# Patient Record
Sex: MALE | Race: WHITE | NOT HISPANIC OR LATINO | Employment: OTHER | URBAN - METROPOLITAN AREA
[De-identification: names, ages, dates, MRNs, and addresses within clinical notes are randomized per-mention and may not be internally consistent; named-entity substitution may affect disease eponyms.]

---

## 2017-06-06 ENCOUNTER — TELEPHONE (OUTPATIENT)
Dept: TRANSPLANT | Facility: CLINIC | Age: 52
End: 2017-06-06

## 2017-06-19 DIAGNOSIS — Z76.82 ORGAN TRANSPLANT CANDIDATE: Primary | ICD-10-CM

## 2017-06-27 ENCOUNTER — OFFICE VISIT (OUTPATIENT)
Dept: TRANSPLANT | Facility: CLINIC | Age: 52
End: 2017-06-27
Payer: COMMERCIAL

## 2017-06-27 ENCOUNTER — HOSPITAL ENCOUNTER (OUTPATIENT)
Dept: RADIOLOGY | Facility: HOSPITAL | Age: 52
Discharge: HOME | End: 2017-06-27
Attending: TRANSPLANT SURGERY
Payer: COMMERCIAL

## 2017-06-27 ENCOUNTER — LAB VISIT (OUTPATIENT)
Dept: LAB | Facility: HOSPITAL | Age: 52
End: 2017-06-27
Payer: COMMERCIAL

## 2017-06-27 ENCOUNTER — CLINICAL SUPPORT (OUTPATIENT)
Dept: INFECTIOUS DISEASES | Facility: CLINIC | Age: 52
End: 2017-06-27
Payer: COMMERCIAL

## 2017-06-27 ENCOUNTER — HOSPITAL ENCOUNTER (OUTPATIENT)
Dept: RADIOLOGY | Facility: HOSPITAL | Age: 52
Discharge: HOME | End: 2017-06-27
Attending: NURSE PRACTITIONER
Payer: COMMERCIAL

## 2017-06-27 VITALS
OXYGEN SATURATION: 100 % | TEMPERATURE: 99 F | BODY MASS INDEX: 28.25 KG/M2 | RESPIRATION RATE: 18 BRPM | HEART RATE: 65 BPM | DIASTOLIC BLOOD PRESSURE: 85 MMHG | WEIGHT: 213.19 LBS | HEIGHT: 73 IN | SYSTOLIC BLOOD PRESSURE: 131 MMHG

## 2017-06-27 DIAGNOSIS — D63.1 ANEMIA OF RENAL DISEASE: Chronic | ICD-10-CM

## 2017-06-27 DIAGNOSIS — Z95.2 HX OF AORTIC VALVE REPLACEMENT: ICD-10-CM

## 2017-06-27 DIAGNOSIS — N18.9 ANEMIA OF RENAL DISEASE: Chronic | ICD-10-CM

## 2017-06-27 DIAGNOSIS — Z76.82 ORGAN TRANSPLANT CANDIDATE: ICD-10-CM

## 2017-06-27 DIAGNOSIS — I10 ESSENTIAL HYPERTENSION: Chronic | ICD-10-CM

## 2017-06-27 DIAGNOSIS — M32.14 LUPUS NEPHRITIS: ICD-10-CM

## 2017-06-27 DIAGNOSIS — N18.4 STAGE 4 CHRONIC KIDNEY DISEASE: Primary | Chronic | ICD-10-CM

## 2017-06-27 DIAGNOSIS — E87.20 METABOLIC ACIDOSIS: Chronic | ICD-10-CM

## 2017-06-27 DIAGNOSIS — M32.9 SYSTEMIC LUPUS ERYTHEMATOSUS, UNSPECIFIED SLE TYPE, UNSPECIFIED ORGAN INVOLVEMENT STATUS: ICD-10-CM

## 2017-06-27 DIAGNOSIS — Z01.818 PRE-TRANSPLANT EVALUATION FOR CKD (CHRONIC KIDNEY DISEASE): ICD-10-CM

## 2017-06-27 DIAGNOSIS — N25.81 SECONDARY HYPERPARATHYROIDISM OF RENAL ORIGIN: Chronic | ICD-10-CM

## 2017-06-27 DIAGNOSIS — R73.9 HYPERGLYCEMIA: ICD-10-CM

## 2017-06-27 LAB
ABO + RH BLD: NORMAL
ALBUMIN SERPL BCP-MCNC: 4 G/DL
ALP SERPL-CCNC: 111 U/L
ALT SERPL W/O P-5'-P-CCNC: 21 U/L
ANION GAP SERPL CALC-SCNC: 11 MMOL/L
APTT BLDCRRT: 24.7 SEC
AST SERPL-CCNC: 33 U/L
BACTERIA #/AREA URNS AUTO: NORMAL /HPF
BASOPHILS # BLD AUTO: 0.04 K/UL
BASOPHILS NFR BLD: 0.4 %
BILIRUB DIRECT SERPL-MCNC: 0.1 MG/DL
BILIRUB SERPL-MCNC: 0.3 MG/DL
BILIRUB UR QL STRIP: NEGATIVE
BLD GP AB SCN CELLS X3 SERPL QL: NORMAL
BUN SERPL-MCNC: 62 MG/DL
C3 SERPL-MCNC: 122 MG/DL
C4 SERPL-MCNC: 30 MG/DL
CALCIUM SERPL-MCNC: 10 MG/DL
CHLORIDE SERPL-SCNC: 110 MMOL/L
CHOLEST/HDLC SERPL: 3.7 {RATIO}
CLARITY UR REFRACT.AUTO: CLEAR
CO2 SERPL-SCNC: 20 MMOL/L
COLOR UR AUTO: ABNORMAL
COMPLEXED PSA SERPL-MCNC: 0.58 NG/ML
CREAT SERPL-MCNC: 3.8 MG/DL
CREAT UR-MCNC: 43 MG/DL
DIFFERENTIAL METHOD: ABNORMAL
EOSINOPHIL # BLD AUTO: 0.2 K/UL
EOSINOPHIL NFR BLD: 2.1 %
ERYTHROCYTE [DISTWIDTH] IN BLOOD BY AUTOMATED COUNT: 14.4 %
EST. GFR  (AFRICAN AMERICAN): 19.7 ML/MIN/1.73 M^2
EST. GFR  (NON AFRICAN AMERICAN): 17 ML/MIN/1.73 M^2
GLUCOSE SERPL-MCNC: 101 MG/DL
GLUCOSE UR QL STRIP: NEGATIVE
HBV CORE AB SERPL QL IA: NEGATIVE
HBV SURFACE AG SERPL QL IA: NEGATIVE
HCT VFR BLD AUTO: 42.3 %
HCV AB SERPL QL IA: NEGATIVE
HDL/CHOLESTEROL RATIO: 27 %
HDLC SERPL-MCNC: 137 MG/DL
HDLC SERPL-MCNC: 37 MG/DL
HGB BLD-MCNC: 13.3 G/DL
HGB UR QL STRIP: NEGATIVE
HIV 1+2 AB+HIV1 P24 AG SERPL QL IA: NEGATIVE
HYALINE CASTS UR QL AUTO: 0 /LPF
INR PPP: 0.9
KETONES UR QL STRIP: NEGATIVE
LDLC SERPL CALC-MCNC: 78.2 MG/DL
LEUKOCYTE ESTERASE UR QL STRIP: NEGATIVE
LYMPHOCYTES # BLD AUTO: 2.7 K/UL
LYMPHOCYTES NFR BLD: 27.5 %
MCH RBC QN AUTO: 28.6 PG
MCHC RBC AUTO-ENTMCNC: 31.4 %
MCV RBC AUTO: 91 FL
MICROSCOPIC COMMENT: NORMAL
MONOCYTES # BLD AUTO: 0.8 K/UL
MONOCYTES NFR BLD: 8 %
NEUTROPHILS # BLD AUTO: 6.1 K/UL
NEUTROPHILS NFR BLD: 61.7 %
NITRITE UR QL STRIP: NEGATIVE
NONHDLC SERPL-MCNC: 100 MG/DL
PH UR STRIP: 5 [PH] (ref 5–8)
PHOSPHATE SERPL-MCNC: 4.7 MG/DL
PLATELET # BLD AUTO: 304 K/UL
PMV BLD AUTO: 10.7 FL
POTASSIUM SERPL-SCNC: 4.8 MMOL/L
PROT SERPL-MCNC: 8 G/DL
PROT UR QL STRIP: ABNORMAL
PROT UR-MCNC: 31 MG/DL
PROT/CREAT RATIO, UR: 0.72
PROTHROMBIN TIME: 9.9 SEC
PTH-INTACT SERPL-MCNC: 81 PG/ML
RBC # BLD AUTO: 4.65 M/UL
RBC #/AREA URNS AUTO: 0 /HPF (ref 0–4)
RPR SER QL: NORMAL
SODIUM SERPL-SCNC: 141 MMOL/L
SP GR UR STRIP: 1.01 (ref 1–1.03)
TRIGL SERPL-MCNC: 109 MG/DL
URN SPEC COLLECT METH UR: ABNORMAL
UROBILINOGEN UR STRIP-ACNC: NEGATIVE EU/DL
WBC # BLD AUTO: 9.96 K/UL
WBC #/AREA URNS AUTO: 0 /HPF (ref 0–5)

## 2017-06-27 PROCEDURE — 85610 PROTHROMBIN TIME: CPT | Mod: TXP

## 2017-06-27 PROCEDURE — 86787 VARICELLA-ZOSTER ANTIBODY: CPT | Mod: TXP

## 2017-06-27 PROCEDURE — 81376 HLA II TYPING 1 LOCUS LR: CPT | Mod: PO,TXP

## 2017-06-27 PROCEDURE — 86038 ANTINUCLEAR ANTIBODIES: CPT | Mod: TXP

## 2017-06-27 PROCEDURE — 83970 ASSAY OF PARATHORMONE: CPT | Mod: TXP

## 2017-06-27 PROCEDURE — 81373 HLA I TYPING 1 LOCUS LR: CPT | Mod: 91,PO,TXP

## 2017-06-27 PROCEDURE — 86704 HEP B CORE ANTIBODY TOTAL: CPT | Mod: TXP

## 2017-06-27 PROCEDURE — 81001 URINALYSIS AUTO W/SCOPE: CPT | Mod: TXP

## 2017-06-27 PROCEDURE — 90471 IMMUNIZATION ADMIN: CPT | Mod: S$GLB,TXP,, | Performed by: INTERNAL MEDICINE

## 2017-06-27 PROCEDURE — 86706 HEP B SURFACE ANTIBODY: CPT | Mod: TXP

## 2017-06-27 PROCEDURE — 71020 XR CHEST PA AND LATERAL: CPT | Mod: TC,TXP

## 2017-06-27 PROCEDURE — 90740 HEPB VACC 3 DOSE IMMUNSUP IM: CPT | Mod: S$GLB,TXP,, | Performed by: INTERNAL MEDICINE

## 2017-06-27 PROCEDURE — 90670 PCV13 VACCINE IM: CPT | Mod: S$GLB,TXP,, | Performed by: INTERNAL MEDICINE

## 2017-06-27 PROCEDURE — 99205 OFFICE O/P NEW HI 60 MIN: CPT | Mod: S$GLB,TXP,, | Performed by: INTERNAL MEDICINE

## 2017-06-27 PROCEDURE — 86225 DNA ANTIBODY NATIVE: CPT | Mod: TXP

## 2017-06-27 PROCEDURE — 81373 HLA I TYPING 1 LOCUS LR: CPT | Mod: PO,TXP

## 2017-06-27 PROCEDURE — 86256 FLUORESCENT ANTIBODY TITER: CPT | Mod: TXP

## 2017-06-27 PROCEDURE — 85730 THROMBOPLASTIN TIME PARTIAL: CPT | Mod: TXP

## 2017-06-27 PROCEDURE — 86825 HLA X-MATH NON-CYTOTOXIC: CPT | Mod: PO,TXP

## 2017-06-27 PROCEDURE — 82248 BILIRUBIN DIRECT: CPT | Mod: TXP

## 2017-06-27 PROCEDURE — 81376 HLA II TYPING 1 LOCUS LR: CPT | Mod: 91,PO,TXP

## 2017-06-27 PROCEDURE — 86480 TB TEST CELL IMMUN MEASURE: CPT | Mod: TXP

## 2017-06-27 PROCEDURE — 86703 HIV-1/HIV-2 1 RESULT ANTBDY: CPT | Mod: TXP

## 2017-06-27 PROCEDURE — 86829 HLA CLASS I/II ANTIBODY QUAL: CPT | Mod: 91,PO,TXP

## 2017-06-27 PROCEDURE — 86900 BLOOD TYPING SEROLOGIC ABO: CPT | Mod: TXP

## 2017-06-27 PROCEDURE — 85025 COMPLETE CBC W/AUTO DIFF WBC: CPT | Mod: TXP

## 2017-06-27 PROCEDURE — 86901 BLOOD TYPING SEROLOGIC RH(D): CPT | Mod: TXP

## 2017-06-27 PROCEDURE — 72170 X-RAY EXAM OF PELVIS: CPT | Mod: 26,TXP,, | Performed by: RADIOLOGY

## 2017-06-27 PROCEDURE — 72170 X-RAY EXAM OF PELVIS: CPT | Mod: TC,TXP

## 2017-06-27 PROCEDURE — 86592 SYPHILIS TEST NON-TREP QUAL: CPT | Mod: TXP

## 2017-06-27 PROCEDURE — 86644 CMV ANTIBODY: CPT | Mod: TXP

## 2017-06-27 PROCEDURE — 99999 PR PBB SHADOW E&M-EST. PATIENT-LVL IV: CPT | Mod: PBBFAC,TXP,, | Performed by: INTERNAL MEDICINE

## 2017-06-27 PROCEDURE — 84153 ASSAY OF PSA TOTAL: CPT | Mod: TXP

## 2017-06-27 PROCEDURE — 97802 MEDICAL NUTRITION INDIV IN: CPT | Mod: S$GLB,TXP,, | Performed by: DIETITIAN, REGISTERED

## 2017-06-27 PROCEDURE — 80061 LIPID PANEL: CPT | Mod: TXP

## 2017-06-27 PROCEDURE — 86825 HLA X-MATH NON-CYTOTOXIC: CPT | Mod: 91,PO,TXP

## 2017-06-27 PROCEDURE — 80053 COMPREHEN METABOLIC PANEL: CPT | Mod: TXP

## 2017-06-27 PROCEDURE — 76770 US EXAM ABDO BACK WALL COMP: CPT | Mod: TC,TXP

## 2017-06-27 PROCEDURE — 86160 COMPLEMENT ANTIGEN: CPT | Mod: 59,TXP

## 2017-06-27 PROCEDURE — 90632 HEPA VACCINE ADULT IM: CPT | Mod: S$GLB,TXP,, | Performed by: INTERNAL MEDICINE

## 2017-06-27 PROCEDURE — 82570 ASSAY OF URINE CREATININE: CPT | Mod: TXP

## 2017-06-27 PROCEDURE — 71020 XR CHEST PA AND LATERAL: CPT | Mod: 26,TXP,, | Performed by: RADIOLOGY

## 2017-06-27 PROCEDURE — 99204 OFFICE O/P NEW MOD 45 MIN: CPT | Mod: S$GLB,TXP,, | Performed by: PHYSICIAN ASSISTANT

## 2017-06-27 PROCEDURE — 76770 US EXAM ABDO BACK WALL COMP: CPT | Mod: 26,TXP,, | Performed by: RADIOLOGY

## 2017-06-27 PROCEDURE — 87340 HEPATITIS B SURFACE AG IA: CPT | Mod: TXP

## 2017-06-27 PROCEDURE — 86803 HEPATITIS C AB TEST: CPT | Mod: TXP

## 2017-06-27 PROCEDURE — 86235 NUCLEAR ANTIGEN ANTIBODY: CPT | Mod: TXP

## 2017-06-27 PROCEDURE — 86160 COMPLEMENT ANTIGEN: CPT | Mod: TXP

## 2017-06-27 PROCEDURE — 84100 ASSAY OF PHOSPHORUS: CPT | Mod: TXP

## 2017-06-27 PROCEDURE — 86828 HLA CLASS I&II ANTIBODY QUAL: CPT | Mod: PO,TXP

## 2017-06-27 PROCEDURE — 86682 HELMINTH ANTIBODY: CPT | Mod: TXP

## 2017-06-27 PROCEDURE — 90472 IMMUNIZATION ADMIN EACH ADD: CPT | Mod: S$GLB,TXP,, | Performed by: INTERNAL MEDICINE

## 2017-06-27 PROCEDURE — 99202 OFFICE O/P NEW SF 15 MIN: CPT | Mod: S$GLB,TXP,, | Performed by: TRANSPLANT SURGERY

## 2017-06-27 RX ORDER — VALSARTAN 160 MG/1
160 TABLET ORAL DAILY
COMMUNITY
End: 2018-09-21

## 2017-06-27 RX ORDER — MULTIVITAMIN
1 TABLET ORAL DAILY
COMMUNITY
End: 2021-11-05

## 2017-06-27 RX ORDER — FUROSEMIDE 20 MG/1
80 TABLET ORAL 2 TIMES DAILY
COMMUNITY
End: 2021-11-05

## 2017-06-27 RX ORDER — METOPROLOL SUCCINATE 50 MG/1
50 TABLET, EXTENDED RELEASE ORAL DAILY
COMMUNITY
End: 2022-10-14

## 2017-06-27 NOTE — PROGRESS NOTES
Transplant Surgery  Kidney Transplant Recipient Evaluation    Referring Physician: Oanh López  Current Nephrologist: Oanh López    Subjective:     Reason for Visit: evaluate transplant candidacy    History of Present Illness: Spike Estrella is a 51 y.o. year old male undergoing transplant evaluation.    Dialysis History: Spike is pre-dialysis.      Transplant History: N/A    Etiology of Renal Disease: Systemic Lupus Erythematosus (based on medical records from referral).    Review of Systems   Constitutional: Negative for activity change, appetite change, chills, diaphoresis, fatigue, fever and unexpected weight change.   HENT: Negative for congestion, dental problem, ear pain, facial swelling, mouth sores, nosebleeds, sore throat, tinnitus, trouble swallowing and voice change.    Eyes: Negative for photophobia, pain and visual disturbance.   Respiratory: Negative for apnea, cough, choking, chest tightness and shortness of breath.    Cardiovascular: Negative for chest pain, palpitations and leg swelling.   Gastrointestinal: Negative for abdominal distention, abdominal pain, blood in stool, constipation, diarrhea, nausea and vomiting.   Endocrine: Negative for cold intolerance and heat intolerance.   Genitourinary: Negative for difficulty urinating, dysuria, flank pain, hematuria and urgency.   Musculoskeletal: Negative for arthralgias and gait problem.   Skin: Negative for color change, pallor and rash.   Neurological: Negative for dizziness, tremors, seizures, syncope and light-headedness.   Hematological: Negative for adenopathy. Does not bruise/bleed easily.   Psychiatric/Behavioral: Negative for agitation and confusion.       Objective:     Physical Exam:  Constitutional:   Vitals reviewed: yes   Well-nourished and well-groomed: yes  Eyes:   Sclerae icteric: no   Extraocular movements intact: yes  GI:    Bowel sounds normal: yes   Tenderness: no    If yes, quadrant/location: not applicable   Palpable  masses: no    If yes, quadrant/location: not applicable   Hepatosplenomegaly: no   Ascites: no   Hernia: no    If yes, type/location: not applicable   Surgical scars: no    If yes, type/location: not applicable  Resp:   Effort normal: yes   Breath sounds normal: yes    CV:   Regular rate and rhythm: yes   Heart sounds normal: yes   Femoral pulses normal: yes   Extremities edematous: no  Skin:   Rashes or lesions present: no    If yes, describe:not applicable   Jaundice:: no    Musculoskeletal:   Gait normal: yes   Strength normal: yes  Psych:   Oriented to person, place, and time: yes   Affect and mood normal: yes    Additional comments: not applicable    Counseling: We provided Spike Estrella with a group education session today.  We discussed kidney transplantation at length with him, including risks, potential complications, and alternatives in the management of his renal failure.  The discussion included complications related to anesthesia, bleeding, infection, primary nonfunction, and ATN.  I discussed the typical postoperative course, length of hospitalization, the need for long-term immunosuppression, and the need for long-term routine follow-up.  I discussed living-donor and -donor transplantation and the relative advantages and disadvantages of each.  I also discussed average waiting times for both living donation and  donation.  I discussed national and center-specific survival rates.  I also mentioned the potential benefit of multicenter listing to candidates listed with centers within more than one organ procurement organization.  All questions were answered.    Final determination of transplant candidacy will be made once evaluation is complete and reviewed by the Kidney & Kidney/Pancreas Selection Committee.         Transplant Surgery - Candidacy   Assessment/Plan:   Spike Estrella is pre-dialysis with CKD stage 4 (GFR 15-29 mL/min). I see no surgical contraindication to placing a  kidney transplant. Based on available information, Spike Estrella is a suitable kidney transplant candidate.     Umm Zheng MD

## 2017-06-27 NOTE — PROGRESS NOTES
HEPATITIS A VACCINE, HEPATITIS B DIALYSIS FORMULATION VACCINE AND PNEUMOCOCCAL 13-VALENT VACCINE ADMINISTERED.  PT TOLERATED WELL AND LEFT IN NAD.

## 2017-06-27 NOTE — PROGRESS NOTES
Pre Transplant Infectious Diseases Consult  Kidney Transplant Recipient Evaluation    Requesting Physician:     Reason for Visit:    Chief Complaint   Patient presents with    Chronic Kidney Disease    Kidney Transplant Pre-evaluation     History of Present Illness  Spike Estrella is a 51 y.o. year old White male with advanced Kidney disease currently being evaluated for Kidney transplant.  Pt is on immunosuppressive therapy, currently on Plaquenil and Cellcept for 23 years. Etiology of kidney disease is lupus. Pt is pre-dialysis.  Patient denies any recent fever, chills, or infective illnesses.      1) Do you have a history of:   YES NO   Diabetes      [] [x]     Diabetic Foot Infection/Bone Infection  []        [x]     Surgical Removal of Spleen   []  [x]               2) Have you had recurrent infections involving:             YES NO  Sinus infections  []         [x]   Sore Throat   []         [x]                 Prostate Infections  []         [x]              Bladder Infections  []         [x]                     Kidney Infections  []         [x]                               Intestinal Infections  []         [x]      Skin Infections   []         [x]       Reproductive Infections          []  [x]   Periodontal Disease  []         [x]        3)Have you ever had: YES     NO UNKNOWN      Chicken Pox   [x]         []  []   Shingles   []         [x]  []   Orolabial Herpes             []  [x]  []   Genital Herpes  []         [x]  []   Cytomegalovirus  []         [x]  []   Tanisha-Barr Virus  []         [x]  []   Genital Warts   []         [x]  []   Hepatitis A   []         [x]  []   Hepatitis B   []         [x]  []   Hepatitis C   []         [x]  []   Syphilis   []         [x]  []   Gonorrhea   []         [x]  []   Chlamydia Infection  []         [x]  []   Intestinal parasites   or worms   []         [x]  []   Fungal Infections  []         [x]  []   Blood Infections  []         [x]  []       Comment:       4) Have  you ever been exposed   YES NO  To someone with tuberculosis?  []   [x]   If yes, what treatment did you receive:     5) What states have you lived in? [place],     6) What countries have you visited for more than 2 weeks? [place], [place]                        YES NO  7) Did you have any associated infections?  [x]  []  Diarrhea in [place], treated with antibiotics, saw provider in [place] and was treated. No recurrence.     8) Are you planning to travel outside the    []  [x]   [place] after your transplant?    9) Household                   YES NO  Do you have pets living in your house?    [x]         []   If yes, describe:  Outdoor dog     Do you spend time or live on a farm or     []         [x]   have livestock or other farm animals?  If yes, which ones:    Do you have a fish tank?          []  [x]       Do you have a litter box?      []         [x]     Do you fish or hunt?  hunt      [x]         []     Do you clean or skin fish or animals?    [x]         []     Do you consume raw or undercooked    []         [x]   meat, fish, or shellfish?      10) What occupations have you had?    11) Patient reports hobby to be          12)Do you garden or otherwise  YES NO   work in the soil?    []         [x]   13)Do you hike, camp, or spend     time in wooded areas?   []         [x]        14) The patient's immunization history was reviewed.    Have you ever received:  YES NO UNKNOWN DATES   Routine Childhood vaccines  [x]         []  []      Influenza vaccine   [x]  []  []     Pneumovax    [x]  []  []     Tetanus-diptheria   [x]         []  [] 2014   Hepatitis A vaccine series       []  [x]  []    Hepatitis B vaccine series         []  [x]  []    Meningitis vaccine   []         []  [x]    Varicella vaccine   []         []  [x]        Based on the patients immunization history and serologies, immunizations were ordered:         Ordered  Not Ordered  Influenza Vaccine     []    [x]   Hepatitis A series at 0,  6 months   [x]     []   Hepatitis B seriesat 0, 1, and 6 months  []    [x]   Hepatitis B High Dose 0,1, and 6 months  [x]    []   Prevnar      [x]    []   Pneumovax      []    [x]    TDap       []    [x]    Zoster       []    [x]   Menactra      []    [x]            The patient was encouraged to contact us about any problems that may develop after immunization and possible side effects were reviewed.      Previous Transplant: no    Etiology of Kidney Disease: lupus nephritis    Allergies: Adhesive and Codeine    There is no immunization history on file for this patient.  Past Medical History:   Diagnosis Date    Anemia of renal disease     CKD (chronic kidney disease)     Essential hypertension     Hx of aortic valve replacement     Hypertension     Immunosuppressed status     Lupus nephritis     Metabolic acidosis     Secondary hyperparathyroidism of renal origin     Stage 4 chronic kidney disease     Stenosis and insufficiency of lacrimal passages     Systemic lupus erythematosus      Past Surgical History:   Procedure Laterality Date    AORTIC VALVE REPLACEMENT      TONSILLECTOMY        Social History     Social History    Marital status:      Spouse name: N/A    Number of children: N/A    Years of education: N/A     Occupational History    Not on file.     Social History Main Topics    Smoking status: Never Smoker    Smokeless tobacco: Former User     Types: Snuff, Chew     Quit date: 5/13/1999    Alcohol use 0.6 oz/week     1 Cans of beer per week    Drug use: Unknown    Sexual activity: Not on file     Other Topics Concern    Not on file     Social History Narrative    No narrative on file       Review of Systems   Constitution: Negative for chills, decreased appetite, fever, weakness, malaise/fatigue, night sweats, weight gain and weight loss.   HENT: Negative for congestion, ear pain, headaches, hearing loss, hoarse voice, sore throat and tinnitus.    Eyes: Negative for blurred vision, pain,  vision loss in left eye, vision loss in right eye and visual disturbance.   Cardiovascular: Negative for chest pain, dyspnea on exertion, leg swelling and palpitations.   Respiratory: Negative for cough, shortness of breath, sputum production and wheezing.    Skin: Negative for dry skin, itching, rash and suspicious lesions.   Musculoskeletal: Negative for back pain, joint pain, myalgias and neck pain.   Gastrointestinal: Negative for abdominal pain, constipation, diarrhea, heartburn, nausea and vomiting.   Genitourinary: Negative for dysuria, flank pain, frequency, hematuria, hesitancy and urgency.   Neurological: Negative for dizziness, numbness and paresthesias.   Psychiatric/Behavioral: Negative for depression and memory loss. The patient does not have insomnia and is not nervous/anxious.      Physical Exam   Constitutional: He is oriented to person, place, and time. He appears well-developed and well-nourished. No distress.   HENT:   Head: Normocephalic and atraumatic.   Mouth/Throat: Oropharynx is clear and moist.   Eyes: EOM are normal. Pupils are equal, round, and reactive to light.   Neck: Normal range of motion. Neck supple.   Cardiovascular: Normal rate, regular rhythm, normal heart sounds and intact distal pulses.  Exam reveals no gallop and no friction rub.    No murmur heard.  Pulmonary/Chest: Effort normal and breath sounds normal. No respiratory distress. He has no wheezes. He has no rales. He exhibits no tenderness.   Abdominal: Bowel sounds are normal. He exhibits no distension and no mass. There is no tenderness. There is no guarding.   Musculoskeletal: Normal range of motion. He exhibits no edema or deformity.   Neurological: He is alert and oriented to person, place, and time.   Skin: Skin is warm and dry. No rash noted. He is not diaphoretic. No erythema.   Psychiatric: He has a normal mood and affect. His behavior is normal. Judgment and thought content normal.   Nursing note and vitals  reviewed.    Diagnostics: No results found for: RPR  No results found for: CMVANTIBODIE  No results found for: HIV1X2  No results found for: HTLVIIIANTIB  No results found for: HEPBSAG  No results found for: HEPBCAB  No results found for: HEPCAB  No results found for: TOXOIGG  No components found for: TOXOIGGINTER  No results found for: INO8GUW  No results found for: CFS4QUG  No results found for: VARICELLAZOS  No results found for: VARICELLAINT  No results found for: STRONGANTIGG  No results found for: EPSTEINBARRV  No results found for: HEPBSAB  No results found for: QUANTIFERON  No results found for: HEPAIGM  No results found for: PPD  No results found for this or any previous visit.         Transplant Infectious Diseases - Candidacy    Assessment/Plan:     Transplant Candidacy: Based on available information, there are no identified significant barriers to transplantation from an infectious disease standpoint pending acceptable serologies.  Final determination of transplant candidacy will be made once evaluation is complete and reviewed by the Transplant Selection Committee.    Quantiferon gold, strongyloides, HIV, and RPR pending. If positive, please refer to ID clinic.    Vaccines today  Hepatitis A   Hepatitis B (high dose). 2nd dose due in 1 month. 3rd dose due in 6 months  prevnar     Shannan Palma PA-C         Counseling:I discussed with Spike the risk for increased susceptibility to infections following transplantation including increased risk for infection right after transplant and if rejection should occur.  The patients has been counseled on the importance of vaccinations including but not limited to a yearly flu vaccine.  Specific guidance has been provided to the patient regarding the patients occupation, hobbies and activities to avoid future infectious complications including but not limited to avoiding undercooked meats and seafood, proper hygiene, and contact with animals.

## 2017-06-27 NOTE — PROGRESS NOTES
"TRANSPLANT NUTRITIONAL ASSESSMENT    Referring Provider: Viola Culp MD    Reason for Visit: Pre-kidney transplant work-up (pre-dialysis)    Age: 51 y.o.  Sex: male    Patient Active Problem List   Diagnosis    Stage 4 chronic kidney disease    SLE (systemic lupus erythematosus)    Hyperglycemia    Essential hypertension    Hx of aortic valve replacement    Lupus nephritis    Anemia of renal disease    Metabolic acidosis    Secondary hyperparathyroidism of renal origin     Past Medical History:   Diagnosis Date    Anemia of renal disease     Essential hypertension     Hx of aortic valve replacement     Immunosuppressed status     Lupus nephritis     Metabolic acidosis     Secondary hyperparathyroidism of renal origin     Stage 4 chronic kidney disease     Stenosis and insufficiency of lacrimal passages     Systemic lupus erythematosus      Lab Results   Component Value Date     06/27/2017    K 4.8 06/27/2017    PHOS 4.7 (H) 06/27/2017    MG 2.2 06/19/2014    CHOL 137 06/27/2017    HDL 37 (L) 06/27/2017    TRIG 109 06/27/2017    ALBUMIN 4.0 06/27/2017    HGBA1C 5.4 06/12/2014    CALCIUM 10.0 06/27/2017       Current Outpatient Prescriptions   Medication Sig    aspirin (ECOTRIN) 325 MG EC tablet Take 1 tablet (325 mg total) by mouth once daily.    furosemide (LASIX) 20 MG tablet Take 20 mg by mouth 2 (two) times daily.    hydroxychloroquine (PLAQUENIL) 200 mg tablet Take 200 mg by mouth once daily.     metoprolol succinate (TOPROL-XL) 50 MG 24 hr tablet Take 50 mg by mouth once daily.    multivitamin (THERAGRAN) per tablet Take 1 tablet by mouth once daily.    valsartan (DIOVAN) 160 MG tablet Take 160 mg by mouth once daily.    mycophenolate (CELLCEPT) 500 mg Tab 1,000 mg 2 (two) times daily.      No current facility-administered medications for this visit.      Allergies: Adhesive and Codeine    Ht Readings from Last 1 Encounters:   06/27/17 6' 1.23" (1.86 m)     Wt Readings from " Last 1 Encounters:   06/27/17 96.7 kg (213 lb 3 oz)      BMI: Body mass index is 27.95 kg/m².    Weight Change/Time: no significant wt change reported  Current Diet: general diet-no restriction  Appetite/Current Intake: good   Exercise/Physical Activity: maintains yard and climbs stairs at work  Nutritional/Herbal Supplements: Centrum Silver multivitamin  Chewing/Swallowing Problems: none reported  Symptoms: none    Estimated Kcal Need: 2400kcals/day (25kcals/kg BW)  Estimated Protein Need: 76gms protein/day 90.8gms/kg BW)    Nutritional History: Pt and wife prepares meals and grocery shops.  Dining out/fast food: 5 times per week usually pizza, burgers and fries, fried chicken, or [employer]-encouraged pt to limit intake of fast food due to sodium and fat content.  Pt typically consumes 2 meals per day, seldom has snacks between meals, beverages include water, coffee, sweet tea, and Gatorade.      Educational Need? yes  Barriers: none identified  Discussed with: patient and wife  Interventions: Patient taught nutrition information regarding   -Renal Nutrition Therapy packet reviewed ( high/low food sources of K, Phos and protein, low sodium and fluid intake, emphasis on moderate protein intake)   Goals/Recommendations: diet adherence, choose healthy options when dining out and limit intake of high phosphorus foods  Actions Taken: instruct/provide written information  Patient and/or family comprehend instructions: yes  Outcome: Verbalizes understanding  Monitoring: contact information provided, will follow-up as needed    Counseling Time: 15 minutes

## 2017-06-27 NOTE — PROGRESS NOTES
PHARM.D. PRE-TRANSPLANT NOTE:    This patient's medication therapy was evaluated as part of his pre-transplant evaluation.    The following pharmacologic concerns were noted: Patient is currently on mycophenolate. Patient has also undergone cytoxan therapy in the past.      Current Outpatient Prescriptions   Medication Sig Dispense Refill    aspirin (ECOTRIN) 325 MG EC tablet Take 1 tablet (325 mg total) by mouth once daily. 30 tablet 3    furosemide (LASIX) 20 MG tablet Take 20 mg by mouth 2 (two) times daily.      hydroxychloroquine (PLAQUENIL) 200 mg tablet Take 200 mg by mouth once daily.   11    metoprolol succinate (TOPROL-XL) 50 MG 24 hr tablet Take 50 mg by mouth once daily.      multivitamin (THERAGRAN) per tablet Take 1 tablet by mouth once daily.      valsartan (DIOVAN) 160 MG tablet Take 160 mg by mouth once daily.      mycophenolate (CELLCEPT) 500 mg Tab 1,000 mg 2 (two) times daily.   3     No current facility-administered medications for this visit.          Currently the patient is responsible for preparing / administering this patient's medications on a daily basis.  I am available for consultation and can be contacted, as needed by the other members of the Kidney Transplant team.

## 2017-06-27 NOTE — PROGRESS NOTES
Transplant Nephrology  Kidney Transplant Recipient Evaluation    Referring Physician: Oanh López  Current Nephrologist: Oanh López    Subjective:   CC:  Initial evaluation of kidney transplant candidacy.    HPI:  Mr. Estrella is a 51 y.o. year old White male who has presented to be evaluated as a potential kidney transplant recipient.  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 He states that he has not had a kidney biopsy. Patient is currently pre-dialysis. He does not have a dialysis access. He has never had any blood transfusion but would be ok accepting blood products if needed.     He was diagnosed with SLE ~25 years ago. He had Cytoxan for either 3 cycles (per patient) or for 3 years (per wife). He also was on steroids. Currently on MMF 1000 mg BID and plaquenil 200 mg. He follows with Dr. Merlin Wilson in [facility] (phone # [phone]).    He has history of porcine AVR on 6/16/14. Per patient and wife he needed the value replacement due to the lupus.     Denies history of DM, MI, CVA, cancer    He is accompanied to visit by his wife     He is working 56 hours a week and wakes up at 4:40 AM every day.     Previous Transplant: no    Functional Status: He doesn't exercise formally but states he is active dong yard work (of 15 acre property); and works in a 2 story building and his office is on the second floor thus is climbing stairs frequently. With the activity that he does he denies any symptoms of chest pain, SOB, claudication.     Past Medical History:  Past Medical History:   Diagnosis Date    Anemia of renal disease     Essential hypertension     Hx of aortic valve replacement     Immunosuppressed status     Lupus nephritis     Metabolic acidosis     Secondary hyperparathyroidism of renal origin     Stage 4 chronic kidney disease     Stenosis and insufficiency of lacrimal passages     Systemic lupus erythematosus        Past Surgical History:   Past Surgical  History:   Procedure Laterality Date    AORTIC VALVE REPLACEMENT  06/16/2014    porcine valve replacement    TONSILLECTOMY         Medications:   Current Outpatient Prescriptions   Medication Sig Dispense Refill    aspirin (ECOTRIN) 325 MG EC tablet Take 1 tablet (325 mg total) by mouth once daily. 30 tablet 3    furosemide (LASIX) 20 MG tablet Take 20 mg by mouth 2 (two) times daily.      hydroxychloroquine (PLAQUENIL) 200 mg tablet Take 200 mg by mouth once daily.   11    metoprolol succinate (TOPROL-XL) 50 MG 24 hr tablet Take 50 mg by mouth once daily.      multivitamin (THERAGRAN) per tablet Take 1 tablet by mouth once daily.      valsartan (DIOVAN) 160 MG tablet Take 160 mg by mouth once daily.      mycophenolate (CELLCEPT) 500 mg Tab 1,000 mg 2 (two) times daily.   3     No current facility-administered medications for this visit.        Social History:  Social History     Social History    Marital status:      Spouse name: N/A    Number of children: N/A    Years of education: N/A     Occupational History    Not on file.     Social History Main Topics    Smoking status: Never Smoker    Smokeless tobacco: Former User     Types: Snuff, Chew     Quit date: 1997    Alcohol use 0.6 oz/week     1 Cans of beer per week      Comment: occasionally     Drug use: No    Sexual activity: Not on file     Other Topics Concern    Not on file     Social History Narrative    Lives with wife and son     Working as a         Family History:   Family History   Problem Relation Age of Onset    Valvular heart disease Mother     Hypertension Mother     Lymphoma Father     No Known Problems Brother     Coronary artery disease Maternal Uncle      s/p CABG in his 50-60s    No Known Problems Brother     No Known Problems Brother     No Known Problems Brother     Kidney disease Neg Hx     Diabetes Neg Hx     Stroke Neg Hx     Lupus Neg Hx        Review of Systems  Constitutional: Denies  "fever/chills, fatigue, night sweats  EENT: +wears eye glasses; +certain pitch hearing loss; Denies trouble swallowing.   Respiratory: +sinus congestion; Denies shortness of breath, dyspnea on exertion, orthopnea, wheezing, hemoptysis, denies known TB exposure or history of positive TB skin test  Cardiovascular: Denies chest pain, palpitations, history of MI, history of stroke, history of DVT  Gastrointestinal: Denies abdominal pain, nausea/vomiting/diarrhea/constipation. Denies history of GI bleeding or ulcers.   Genitourinary: +microscopic hematuria; +foamy urine; ?kidney stone - renal colic symptoms but never was detected on imaging and did not pass it in ~2000; Denies recurrent UTI's, history of urinary obstruction, dysuria, urinary frequency, incontinence  Musculoskeletal: Denies trouble moving extremities, denies joint pain or swelling  Skin: +open sores on shins from scratching his dry skin; +likely Raynaud's; Denies history of skin cancer, denies rash  Heme/onc: +bruises easily; Denies any history of cancer  Endocrine: Denies thyroid disease, unintentional weight loss/weight gain  Neurological: +seldom headaches; Denies tremors, seizures, dizziness, peripheral neuropathy  Psychiatric: Denies anxiety, depression. Denies hallucinations or delusions.    Potential Donors: all 4 of his brothers and wife       Objective:   Blood pressure 131/85, pulse 65, temperature 98.7 °F (37.1 °C), temperature source Oral, resp. rate 18, height 6' 1.23" (1.86 m), weight 96.7 kg (213 lb 3 oz), SpO2 100 %.body mass index is 27.95 kg/m².    Physical Exam  General: No acute distress, well groomed, alert and oriented x 3  HEENT: Normocephalic, atraumatic, PERRLA, sclera anicteric, conjunctiva/corneas clear, EOM's intact bilaterally, external inspection of ears and nose normal, moist mucous membranes, no oral ulcerations/lesions   Neck: Supple, symmetrical, trachea midline, no masses, no carotid bruits, no JVD, thyroid is normal " without nodules or enlargement   Respiratory: Clear to auscultation bilaterally, respirations unlabored, no rales/rhonchi/wheezing   Cardiovacular: Regular rate and rhythm, S1, S2 normal, no murmurs, no rubs or gallops   Gastrointestinal: Soft, non-tender, bowel sounds normal, no masses, no palpable organomegaly  Extremities: No clubbing or cyanosis of upper extremities bilaterally, no pedal edema bilaterally; +2 bilateral radial pulses  Skin: warm and dry; no rash on exposed skin; scratch marks along right shin with some surrounding erythema  Lymph nodes: Cervical and supraclavicular nodes normal   Neurologic: No focal neurologic deficits.   Musculoskeletal: moves all extremities without difficulty, FROM, 5/5 strength, ambulates without an assistive device  Psychiatric: Normal mood and affect. Responds appropriately to questions.      Labs:  Lab Results   Component Value Date    WBC 9.96 06/27/2017    HGB 13.3 (L) 06/27/2017    HCT 42.3 06/27/2017     06/27/2017    K 4.8 06/27/2017     06/27/2017    CO2 20 (L) 06/27/2017    BUN 62 (H) 06/27/2017    CREATININE 3.8 (H) 06/27/2017    EGFRNONAA 17.0 (A) 06/27/2017    CALCIUM 10.0 06/27/2017    PHOS 4.7 (H) 06/27/2017    MG 2.2 06/19/2014    ALBUMIN 4.0 06/27/2017    AST 33 06/27/2017    ALT 21 06/27/2017    PTH 81.0 (H) 06/27/2017       Lab Results   Component Value Date    BILIRUBINUA Negative 06/12/2014    PROTEINUA 1+ (A) 06/12/2014    NITRITE Negative 06/12/2014    RBCUA 0 06/12/2014    WBCUA 0 06/12/2014       No results found for: HLAABCTYPE    Labs were reviewed with the patient.    Assessment:     1. Stage 4 chronic kidney disease    2. Hx of aortic valve replacement    3. Essential hypertension    4. Hyperglycemia    5. Pre-transplant evaluation for CKD (chronic kidney disease)    6. Lupus nephritis    7. Anemia of renal disease    8. Metabolic acidosis    9. Systemic lupus erythematosus, unspecified SLE type, unspecified organ involvement status     10. Secondary hyperparathyroidism of renal origin        Plan:     Transplant Candidacy:   After obtaining history and performing physical exam as well as reviewing available diagnostic studies, Mr. Estrella is a suitable kidney transplant candidate.  Final determination of transplant candidacy will be made once workup is complete and reviewed by the selection committee.    Prior to Listing, will need the following items to be completed:  1. Cardiac stress test   2. Standard serologies and blood work  3. UA and UPC today in clinic   4. Obtain records and clearance from rheumatology especially regarding how much Cytoxan he received and when  5. Urology consult with cystoscopy given patient reported microscopic hematuria and prior Cytoxan use   6. Wound clearance    Advised patient to see his PCP regarding the wounds on his shin with surrounding erythema. Advised him to go to ED if he developed fevers or if the wounds got worse.       Viola Culp MD       [facility] Patient Status  Functional Status: 70% - Cares for self: unable to carry on normal activity or active work  Physical Capacity: No Limitations

## 2017-06-27 NOTE — PATIENT INSTRUCTIONS
1. Continue exercising   2. Don't scratch any more   3. Talk to your PCP about the wounds on the shin  4. Go to ED if you have fevers or if the redness is worse   5. We will need to get records from your rheumatologist   6. We will have you see a urologist and get cystoscopy done   7. Give a urine sample today in clinic so we can assess the amount of protein you are spilling in the urine

## 2017-06-27 NOTE — LETTER
June 28, 2017        Oanh López  [address] [facility] [place] [address]  Phone: [phone]  Fax: [phone]             [facility]  [address]  [facility] [address]  Phone: [phone]   Patient: Spike Estrella   MR Number: [id number]   YOB: 1965   Date of Visit: 6/27/2017       Dear Dr. Oanh López    Thank you for referring Spike Estrella to me for evaluation. Attached you will find relevant portions of my assessment and plan of care.    If you have questions, please do not hesitate to call me. I look forward to following Spike Estrella along with you.    Sincerely,    Viola Culp MD    Enclosure    If you would like to receive this communication electronically, please contact [email or web address] or [phone] to request [employer] access.    [employer] is a tool which provides read-only access to select patient information with whom you have a relationship. Its easy to use and provides real time access to review your patients record including encounter summaries, notes, results, and demographic information.    If you feel you have received this communication in error or would no longer like to receive these types of communications, please e-mail [email or web address]

## 2017-06-28 ENCOUNTER — TELEPHONE (OUTPATIENT)
Dept: TRANSPLANT | Facility: CLINIC | Age: 52
End: 2017-06-28

## 2017-06-28 LAB
ANA SER QL IF: NORMAL
HEP. B SURF AB, QUAL: NEGATIVE
HEP. B SURF AB, QUANT.: <3 MIU/ML
SMOOTH MUSCLE AB TITR SER IF: ABNORMAL {TITER}
STRONGYLOIDES ANTIBODY IGG: NEGATIVE
VARICELLA INTERPRETATION: POSITIVE
VARICELLA ZOSTER IGG: 4 ISR

## 2017-06-28 NOTE — TELEPHONE ENCOUNTER
Compliance check:  Nephrology office reports in the past three months pt has had 0 no shows, 0 AMAs, labs within normal range, transportation no concerns and family support no concerns.    Reported by Dr. Rene Solis's nurse

## 2017-06-28 NOTE — PROGRESS NOTES
Transplant Recipient Adult Psychosocial Assessment    Spike Estrella  [address] [facility] [address] [facility] [place] [address]    Telephone Information:   Mobile [phone]   Home  [phone] (home)  Work  There is no work phone number on file.  E-mail  [email or web address]    Sex: male  YOB: 1965  Age: 51 y.o.    Encounter Date: 6/27/2017  U[place] Citizen: yes  Primary Language: English   Needed: no    Emergency Contact:  Name: Sonja Estrella  Relationship: wife  Address: same as above  Phone Numbers:  [phone] (home), n/a (work), [phone] (mobile)    Family/Social Support:   Number of dependents/: 13 y/o sonAnibal  Marital history:  once to current wife of 23 yrs.  Other family dynamics: Parents are living; four brothers with whom he reports are very close.  One lives in [place], one in [place], [place], one in [place] and one lives temporarily in [facility] [place].    Household Composition:  Name: Patient and Sonja Estrella   Age: both 50 y/o  Relationship: patient and wife  Does person drive? yes    Name: Anibal Estrella  Age: 14  Relationship: son  Does person drive? no    Do you and your caregivers have access to reliable transportation? yes  PRIMARY CAREGIVER: Sonja Estrella  will be primary caregiver, phone number [phone].      provided in-depth information to patient and caregiver regarding pre- and post-transplant caregiver role.   strongly encourages patient and caregiver to have concrete plan regarding post-transplant care giving, including back-up caregiver(s) to ensure care giving needs are met as needed.    Patient and Caregiver states understanding all aspects of caregiver role/commitment and is able/willing/committed to being caregiver to the fullest extent necessary.    Patient and Caregiver verbalizes understanding of the education provided today and caregiver responsibilities.         remains available. Patient and Caregiver agree to  contact  in a timely manner if concerns arise.      Able to take time off work without financial concerns: yes.     Additional Significant Others who will Assist with Transplant:  Name: Danny Stevens and his wife  Age: 54  City: [place] State: [place]  Relationship: brother and sister in law  Does person drive? yes      Living Will: no  Healthcare Power of : yes  Advance Directives on file: <<no information> per medical record.  Verbally reviewed LW/HCPA information.   provided patient with copy of LW/HCPA documents and provided education on completion of forms.    Living Donors: All of patient's brothers have expressed interest in donation    Highest Education Level: Attended College/Technical School  Reading Ability: college  Reports difficulty with: hearing (minor hearing loss from occupational exposure)  Learns Best By:  Hands on     Status: no  VA Benefits: no     Working for Income: yes  If yes, working activity level: Working Full Time    Patient is currently working at [employer] since 2016.  Formerly at [employer]    Spouse/Significant Other Employment: Wife works full time in logistics for a contractor at [employer].      Disabled: no    Monthly Income:  Other: $not disclosed  Able to afford all costs now and if transplanted, including medications: yes  Patient and Caregiver verbalizes understanding of personal responsibilities related to transplant costs and the importance of having a financial plan to ensure that patients transplant costs are fully covered.       provided fundraising information/education. Patient and Caregiververbalizes understanding.   remains available.    Insurance:   Payor/Plan Subscr  Sex Relation Sub. Ins. ID Effective Group Num   1. BLUE CROSS BL* MISSY STEVENS 1965 Female  [id number]                                    [id number]     Primary  Insurance (for UNOS reporting): Private Insurance  Secondary Insurance (for UNOS reporting): None  Patient and Caregiver verbalizes clear understanding that patient may experience difficulty obtaining and/or be denied insurance coverage post-surgery. This includes and is not limited to disability insurance, life insurance, health insurance, burial insurance, long term care insurance, and other insurances.      Patient and Caregiver also reports understanding that future health concerns related to or unrelated to transplantation may not be covered by patient's insurance.  Resources and information provided and reviewed.     Patient and Caregiver provides verbal permission to release any necessary information to outside resources for patient care and discharge planning.  Resources and information provided are reviewed.      Dialysis Adherence: Patient and Caregiver reports he is predialysis and is very compliant with MD recommendations.  SW called pt's nephrologist faxed compliance letter and left message.  Dr Oanh López [phone]  Infusion Service: patient utilizing? no  Home Health: patient utilizing? no  DME: no  Pulmonary/Cardiac Rehab: none   ADLS:  Pt states ability to independently accomplish all adls.    Adherence:   Pt states current and expected compliance with all healthcare recommendations..  Adherence education and counseling provided.     Per History Section:Past Medical History:   Diagnosis Date    Anemia of renal disease     Essential hypertension     Hx of aortic valve replacement     Immunosuppressed status     Lupus nephritis     Metabolic acidosis     Secondary hyperparathyroidism of renal origin     Stage 4 chronic kidney disease     Stenosis and insufficiency of lacrimal passages     Systemic lupus erythematosus      Social History   Substance Use Topics    Smoking status: Never Smoker    Smokeless tobacco: Former User     Types: Snuff, Chew     Quit date: 1997    Alcohol  use 0.6 oz/week     1 Cans of beer per week      Comment: occasionally      History   Drug Use No     History   Sexual Activity    Sexual activity: Not on file       Per Today's Psychosocial:  Tobacco: none, patient denies any use.  Pt reports he dipped and chewed but quit 20 yrs ago.  Alcohol: occasional.  Illicit Drugs/Non-prescribed Medications: none, patient denies any use.    Patient and Caregiver states clear understanding of the potential impact of substance use as it relates to transplant candidacy and is aware of possible random substance screening.  Substance abstinence/cessation counseling, education and resources provided and reviewed.     Arrests/DWI/Treatment/Rehab: patient denies    Psychiatric History:    Mental Health: pt denied any mental health concerns  Psychiatrist/Counselor: none  Medications:  none  Suicide/Homicide Issues: Pt denies current or past suicidal/homicidal ideation.   Safety at home: Pt denies any home safety concerns.    Knowledge: Patient and Caregiver states having clear understanding and realistic expectations regarding the potential risks and potential benefits of organ transplantation and organ donation and agrees to discuss with health care team members and support system members, as well as to utilize available resources and express questions and/or concerns in order to further facilitate the pt informed decision-making.  Resources and information provided and reviewed.    Patient and Caregiver is aware of [facility]'s affiliation and/or partnership with agencies in home health care, LTAC, SNF, [facility], and other hospitals and clinics.    Understanding: Patient and Caregiver reports having a clear understanding of the many lifetime commitments involved with being a transplant recipient, including costs, compliance, medications, lab work, procedures, appointments, concrete and financial planning, preparedness, timely and appropriate communication of concerns, abstinence (ETOH,  tobacco, illicit non-prescribed drugs), adherence to all health care team recommendations, support system and caregiver involvement, appropriate and timely resource utilization and follow-through, mental health counseling as needed/recommended, and patient and caregiver responsibilities.  Social Service Handbook, resources and detailed educational information provided and reviewed.  Educational information provided.    Patient and Caregiver also reports current and expected compliance with health care regime and states having a clear understanding of the importance of compliance.      Patient and Caregiver reports a clear understanding that risks and benefits may be involved with organ transplantation and with organ donation.       Patient and Caregiver also reports clear understanding that psychosocial risk factors may affect patient, and include but are not limited to feelings of depression, generalized anxiety, anxiety regarding dependence on others, post traumatic stress disorder, feelings of guilt and other emotional and/or mental concerns, and/or exacerbation of existing mental health concerns.  Detailed resources provided and discussed.      Patient and Caregiver agrees to access appropriate resources in a timely manner as needed and/or as recommended, and to communicate concerns appropriately.  Patient and Caregiver also reports a clear understanding of treatment options available.     Patient and Caregiver received education in a group setting.   reviewed education, provided additional information, and answered questions.    Feelings or Concerns: Pt stated his only concern is to be here to see his son grow up.  Wife expressed concerns about rejection.  SW encouraged wife to ask specific questions to nephrologist or surgeon and provided general info regarding taking immunosuppressants.    Coping: Pt reports he chema through attending mass at [employer], watching and participating in sports  (soccer, football, golf, NASCAR) and spending time with family, especially son's activities.     Goals: Avoid dialysis, see son grow up..  Patient referred to Vocational Rehabilitation.    Interview Behavior: Patient and Caregiver presents as alert and oriented x 4, pleasant, good eye contact, well groomed, recall good, concentration/judgement good, average intelligence, calm, communicative, cooperative and asking and answering questions appropriately. He was accompanied by his wife who appeared very supportive of pt's pursuit of transplant.         Transplant Social Work - Candidacy  Assessment/Plan:     Psychosocial Suitability: Patient presents as an acceptable candidate for kidney transplant at this time. Based on psychosocial risk factors, patient presents as low risk, due to absence of significant psychosocial risk factors..    Recommendations/Additional Comments: Recommended fundraising.  Pt and wife may benefit from [address] post transplant.    Oumou Vizcarra LCSW

## 2017-06-29 LAB
ANTI SM/RNP ANTIBODY: 5.55 EU
ANTI-SM/RNP INTERPRETATION: NEGATIVE
DSDNA AB SER-ACNC: NORMAL [IU]/ML
MITOGEN NIL: 8.01 IU/ML
NIL: 0.05 IU/ML
TB ANTIGEN NIL: -0.01 IU/ML
TB ANTIGEN: 0.04 IU/ML
TB GOLD: NEGATIVE

## 2017-06-29 NOTE — PROGRESS NOTES
INITIAL PATIENT EDUCATION NOTE    Mr. Spike Estrella was seen in pre-kidney transplant clinic for evaluation for kidney, kidney/pancreas or pancreas only transplant.  The patient attended a group education session that discussed/reviewed the following aspects of transplantation: evaluation and selection committee process, UNOS waitlist management/multiple listings, types of organs offered (KDPI < 85%, KDPI > 85%, PHS increased risk, DCD), financial aspects, surgical procedures, dietary instruction pre- and post-transplant, health maintenance pre- and post-transplant, post-transplant hospitalization and outpatient follow-up, potential to participate in a research protocol, and medication management and side effects.  A question and answer session was provided after the presentation.    The patient was seen by all members of the multi-disciplinary team to include: Nephrologist/PA, Surgeon, , Transplant Coordinator, , Pharmacist and Dietician (if applicable).    The patient reviewed and signed all consents for evaluation which were witnessed and sent to scanning into the EPIC chart.    The patient was given an education book and plan for further evaluation based on his individual assessment.      The patient was encouraged to call with any questions or concerns.

## 2017-06-30 DIAGNOSIS — Z76.82 ORGAN TRANSPLANT CANDIDATE: Primary | ICD-10-CM

## 2017-06-30 LAB — CMV IGG SERPL QL IA: NORMAL

## 2017-07-10 ENCOUNTER — HOSPITAL ENCOUNTER (OUTPATIENT)
Dept: CARDIOLOGY | Facility: HOSPITAL | Age: 52
Discharge: HOME | End: 2017-07-10
Attending: NURSE PRACTITIONER
Payer: COMMERCIAL

## 2017-07-10 ENCOUNTER — HOSPITAL ENCOUNTER (OUTPATIENT)
Dept: RADIOLOGY | Facility: HOSPITAL | Age: 52
Discharge: HOME | End: 2017-07-10
Attending: NURSE PRACTITIONER
Payer: COMMERCIAL

## 2017-07-10 DIAGNOSIS — Z76.82 ORGAN TRANSPLANT CANDIDATE: ICD-10-CM

## 2017-07-10 DIAGNOSIS — I51.7 CARDIOMEGALY: ICD-10-CM

## 2017-07-10 LAB
B CELL RESULTS - XM AUTO: NEGATIVE
B MCS AVERAGE - XM AUTO: 9.5
DIASTOLIC DYSFUNCTION: NO
FXMAU TESTING DATE: NORMAL
HLA AB QL: NEGATIVE
HLA AB SERPL: NEGATIVE
HLATY INTERPRETATION: NORMAL
SCRFL TESTING DATE: NORMAL
SERUM COLLECTION DT - XM AUTO: NORMAL
SERUM COLLECTION DT: NORMAL
T CELL RESULTS - XM AUTO: NEGATIVE
T MCS AVERAGE - XM AUTO: 2.5

## 2017-07-10 PROCEDURE — A9502 TC99M TETROFOSMIN: HCPCS | Mod: TXP

## 2017-07-10 PROCEDURE — 93306 TTE W/DOPPLER COMPLETE: CPT | Mod: 26,TXP,, | Performed by: INTERNAL MEDICINE

## 2017-07-10 PROCEDURE — 78452 HT MUSCLE IMAGE SPECT MULT: CPT | Mod: 26,TXP,, | Performed by: INTERNAL MEDICINE

## 2017-07-10 PROCEDURE — 93016 CV STRESS TEST SUPVJ ONLY: CPT | Mod: TXP,,, | Performed by: INTERNAL MEDICINE

## 2017-07-10 PROCEDURE — 63600175 PHARM REV CODE 636 W HCPCS: Mod: TXP

## 2017-07-10 PROCEDURE — 93017 CV STRESS TEST TRACING ONLY: CPT | Mod: TXP

## 2017-07-10 PROCEDURE — 93018 CV STRESS TEST I&R ONLY: CPT | Mod: TXP,,, | Performed by: INTERNAL MEDICINE

## 2017-07-10 PROCEDURE — 78452 HT MUSCLE IMAGE SPECT MULT: CPT | Mod: TC,TXP

## 2017-07-10 PROCEDURE — 93306 TTE W/DOPPLER COMPLETE: CPT | Mod: TXP

## 2017-07-10 RX ORDER — REGADENOSON 0.08 MG/ML
INJECTION, SOLUTION INTRAVENOUS
Status: COMPLETED
Start: 2017-07-10 | End: 2017-07-10

## 2017-07-13 LAB
HLA DRB4 1: NORMAL
HLA SSO DNA TYPING CLASS I & II INTERPRETATION: NORMAL
HLA-A 1 SERO. EQUIV: 1
HLA-A 1: NORMAL
HLA-A 2 SERO. EQUIV: 2
HLA-A 2: NORMAL
HLA-B 1 SERO. EQUIV: 35
HLA-B 1: NORMAL
HLA-B 2 SERO. EQUIV: 49
HLA-B 2: NORMAL
HLA-BW 1 SERO. EQUIV: 6
HLA-BW 2 SERO. EQUIV: 4
HLA-C 1: NORMAL
HLA-C 2: NORMAL
HLA-CW 1 SERO. EQUIV: 4
HLA-CW 2 SERO. EQUIV: 7
HLA-DQ 1 SERO. EQUIV: 5
HLA-DQ 2 SERO. EQUIV: 6
HLA-DQB1 1: NORMAL
HLA-DQB1 2: NORMAL
HLA-DRB1 1 SERO. EQUIV: 103
HLA-DRB1 1: NORMAL
HLA-DRB1 2 SERO. EQUIV: 13
HLA-DRB1 2: NORMAL
HLA-DRB3 1: NORMAL
HLA-DRB3 2: NORMAL
HLA-DRB345 1 SERO. EQUIV: 52
HLA-DRB345 2 SERO. EQUIV: NORMAL
HLA-DRB4 2: NORMAL
HLA-DRB5 1: NORMAL
HLA-DRB5 2: NORMAL
SSDQB TESTING DATE: NORMAL
SSDRB TESTING DATE: NORMAL
SSOA TESTING DATE: NORMAL
SSOB TESTING DATE: NORMAL
SSOC TESTING DATE: NORMAL
SSODR TESTING DATE: NORMAL
TYSSO TESTING DATE: NORMAL

## 2017-07-21 LAB
AORTIC VALVE STENOSIS: ABNORMAL
DIASTOLIC DYSFUNCTION: NO
ESTIMATED PA SYSTOLIC PRESSURE: 20.47
MITRAL VALVE MOBILITY: ABNORMAL
MITRAL VALVE REGURGITATION: ABNORMAL
RETIRED EF AND QEF - SEE NOTES: 70 (ref 55–65)
TRICUSPID VALVE REGURGITATION: ABNORMAL

## 2017-07-24 ENCOUNTER — APPOINTMENT (OUTPATIENT)
Dept: LAB | Facility: HOSPITAL | Age: 52
End: 2017-07-24
Attending: NURSE PRACTITIONER
Payer: COMMERCIAL

## 2017-07-24 ENCOUNTER — TELEPHONE (OUTPATIENT)
Dept: TRANSPLANT | Facility: CLINIC | Age: 52
End: 2017-07-24

## 2017-07-24 PROCEDURE — 81000 URINALYSIS NONAUTO W/SCOPE: CPT | Mod: TXP

## 2017-07-24 NOTE — TELEPHONE ENCOUNTER
----- Message from Marcella Mobley sent at 7/21/2017  1:53 PM CDT -----      ----- Message -----  From: Naman Wise MA  Sent: 7/21/2017  11:34 AM  To: [facility] Pre-Kidney Transplant Clinical    Pt would like call back from Emelia @ [phone]

## 2017-07-24 NOTE — TELEPHONE ENCOUNTER
Call returned and all questions answered. He is informed I have sent his ECHO and STress test to his local cardiologist.

## 2017-07-28 ENCOUNTER — TELEPHONE (OUTPATIENT)
Dept: UROLOGY | Facility: CLINIC | Age: 52
End: 2017-07-28

## 2017-07-28 NOTE — TELEPHONE ENCOUNTER
----- Message from Basilia Beauchamp, RN sent at 7/25/2017  8:42 AM CDT -----  I am in the process of requesting records from Dr. Merlin Wilson, Dr. Sai Cheema, and Dr. Oanh López.   ----- Message -----  From: Constantin Joe MD  Sent: 7/24/2017   8:53 PM  To: Yareli Ashton, FNP, Basilia Beauchamp, RN, #    Pt scheduled for yareli today 7/24 but as is for txp workup/cysto, he was rebooked to me next Monday.  As yareli did not see him and her visit will be erroneous encounter, please change him on my schedule back to a consult visit with the initial visit notes and referring practicitioner lilian griggs np.   I am aware it is not a 30 minute slot, he can stay where he is on the schedule 7/31 where he is as he has to come after work  There was mention of records that were needed - after changing pt appointment on schedule, please ask yareli where records are needed from so you can get them - and if any imaging has been done (likely has had at least a CT if txp workup) please make sure we not only get report but patient brings imaging on a disc if was not done in [facility] system.  Please get any records to me this week so I can review in advance of his visit  thanks

## 2017-07-28 NOTE — TELEPHONE ENCOUNTER
----- Message from Constantin Joe MD sent at 7/24/2017  8:53 PM CDT -----  Pt scheduled for yareli today 7/24 but as is for txp workup/cysto, he was rebooked to me next Monday.  As yareli did not see him and her visit will be erroneous encounter, please change him on my schedule back to a consult visit with the initial visit notes and referring practicitioner lilian griggs np.   I am aware it is not a 30 minute slot, he can stay where he is on the schedule 7/31 where he is as he has to come after work  There was mention of records that were needed - after changing pt appointment on schedule, please ask yareli where records are needed from so you can get them - and if any imaging has been done (likely has had at least a CT if txp workup) please make sure we not only get report but patient brings imaging on a disc if was not done in [facility] system.  Please get any records to me this week so I can review in advance of his visit  thanks

## 2017-07-31 ENCOUNTER — OFFICE VISIT (OUTPATIENT)
Dept: UROLOGY | Facility: CLINIC | Age: 52
End: 2017-07-31
Payer: COMMERCIAL

## 2017-07-31 VITALS
WEIGHT: 215.81 LBS | SYSTOLIC BLOOD PRESSURE: 127 MMHG | HEIGHT: 73 IN | RESPIRATION RATE: 18 BRPM | TEMPERATURE: 98 F | BODY MASS INDEX: 28.6 KG/M2 | HEART RATE: 61 BPM | DIASTOLIC BLOOD PRESSURE: 75 MMHG

## 2017-07-31 DIAGNOSIS — Z12.5 PROSTATE CANCER SCREENING: ICD-10-CM

## 2017-07-31 DIAGNOSIS — M32.14 LUPUS NEPHRITIS: Primary | ICD-10-CM

## 2017-07-31 PROCEDURE — 99244 OFF/OP CNSLTJ NEW/EST MOD 40: CPT | Mod: NTX,S$GLB,, | Performed by: UROLOGY

## 2017-07-31 PROCEDURE — 99999 PR PBB SHADOW E&M-EST. PATIENT-LVL III: CPT | Mod: PBBFAC,TXP,, | Performed by: UROLOGY

## 2017-07-31 NOTE — PROGRESS NOTES
[facility] Urology Consult:  Consult from: Jesenia White NP and Dr Viola Culp (transplant)  Consult for: microhematuria    Spike Estrella is a 51 y.o. male referred by CHITO White and Dr Viola Culp for evaluation for potential cystoscopy due to patient reported microhematuria and prior cytoxan use.    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 Evaluated 6/27/17 at [facility] by Dr Culp for pre-kidney transplant workup.  Had been treated with cytoxan in early 90s for his lupus and had 5+ rounds until lupus would go into remission.  Unsure if he received mesna  Never experienced gross hematuria during or since treatment.    Udip today in clinic positive for trace protein only. Negative for blood.  Review of urinalyses in system from 2014 and June and July of 2017 have 0 RBCs in urine.  No history GH  Non/never smoker  No family history of  malignancy  No personal or family history of kidney stones    No urinary hesitancy or intermittency.  NTF 2-3x/night  Mild pv dribble  Not bothered at all by urinary symptoms.  Feels like he empties his bladder  PVR 0cc.    PSA 0.5 8/31/15 and 10/3/14, and 0.4 9/20/13, 0.5 7/1/11    Rheumatologist: Dr Merlin Wilson  Nephrologist: Dr Kavya López  PCP: Dr Dylan Cheema  Interventional cardiologist: Dr Chase Miller    Last note from Dr Cheema reviewed from 8/31/15 noting well controlled BP on current regimen at that time and moderate hyperlipidemia managed with diet.  Last colonoscopy noted to be in 2014. Also had history of CT in 2014 with possible pancreatic mass with negative follow up MRCP. Also had history of BPH with 2+ prostate and mild PV dribble but no sig LUTS.      HTN, SLE with lupus nephritis, AS (AVR porcine 6/14), cardiac stents 2013  Past Medical History:   Diagnosis Date    Anemia of renal disease     Essential hypertension     Hx of aortic valve replacement     Immunosuppressed status     Lupus nephritis     Metabolic acidosis     Secondary  hyperparathyroidism of renal origin     Stage 4 chronic kidney disease     Stenosis and insufficiency of lacrimal passages     Systemic lupus erythematosus        Past Surgical History:   Procedure Laterality Date    AORTIC VALVE REPLACEMENT  06/16/2014    porcine valve replacement    TONSILLECTOMY         Family History   Problem Relation Age of Onset    Valvular heart disease Mother     Hypertension Mother     Lymphoma Father     No Known Problems Brother     Coronary artery disease Maternal Uncle      s/p CABG in his 50-60s    No Known Problems Brother     No Known Problems Brother     No Known Problems Brother     Kidney disease Neg Hx     Diabetes Neg Hx     Stroke Neg Hx     Lupus Neg Hx        Social History     Social History    Marital status:      Spouse name: N/A    Number of children: N/A    Years of education: N/A     Occupational History    Not on file.     Social History Main Topics    Smoking status: Never Smoker    Smokeless tobacco: Former User     Types: Snuff, Chew     Quit date: 1997    Alcohol use 0.6 oz/week     1 Cans of beer per week      Comment: occasionally     Drug use: No    Sexual activity: Not on file     Other Topics Concern    Not on file     Social History Narrative    Lives with wife and son     Working as a         Review of patient's allergies indicates:   Allergen Reactions    Adhesive     Codeine Anxiety       Medications Reviewed: see MAR    ROS:    Constitutional: denies fevers, chills, night sweats, fatigue, malaise  Respiratory: negative for cough, shortness of breath, wheezing, dyspnea.  Cardiovascular: + for high blood pressure, negative for chest pain, varicose veins, ankle swelling, palpitations, syncope.  GI: negative for abdominal pain, heartburn, indigestion, nausea, vomiting, constipation, diarrhea, blood in stool.   Urology: as noted above in HPI  Endocrinology: negative for cold intolerance, excessive thirst, not  "feeling tired/sluggish, no heat intolerance.   Hematology/Lymph: negative for easy bleeding, easy bruising, swollen glands.  Musculoskeletal: negative for back pain, joint pain, joint swelling, neck pain.  Allergy-Immunology: negative for seasonal allergies, negative for unusual infections.   Skin: negative for boils, breast lumps, hives, itching, rash.   Neurology: negative for, dizziness, headache, tingling/numbness, tremors.   Psych: satisfied with life; negative for, anxiety, depression, suicidal thoughts.     PHYSICAL EXAM:    Vitals:    07/31/17 1551   BP: 127/75   Pulse: 61   Resp: 18   Temp: 97.8 °F (36.6 °C)     Body mass index is 28.48 kg/m². Weight: 97.9 kg (215 lb 13.3 oz) Height: 6' 1" (185.4 cm)       General: Alert, cooperative, no distress, appears stated age  Head: Normocephalic, without obvious abnormality, atraumatic  Neck: no masses, no thyromegaly, no lymphadenopathy  Eyes: PERRL, conjunctiva/corneas clear  Lungs: Respirations unlabored, normal effort, no accessory muscle use  CV: Warm and well perfused extremities  Abdomen: Soft, non-tender, no CVA tenderness, no hepatosplenomegaly, no hernia  Penis: phallus normal, well cared for, no plaques or lesions.   Scrotum: no cysts, no lesions, no rash, no hydrocele.   Epididymes: normal, nontender, symmetrical, no masses or cysts.   Testes: normal, both descended, no masses.   Urethra: palpably normal with orthotopic meatus of normal size    ZI: normal sphincter tone, no masses, no hemmorrhoids   PROSTATE: 25g, no nodules, non-tender, symmetrical.   Extremities: Extremities normal, atraumatic, no cyanosis or edema  Skin: Normal color, texture, and turgor, no rashes or lesions  Psych: Appropriate, well oriented, normal affect, normal mood  Neuro: Non-focal    Lab Results   Component Value Date    PSA 0.58 06/27/2017       LABS:    Recent Results (from the past 336 hour(s))   POCT urinalysis, dipstick or tablet reag    Collection Time: 07/24/17  3:08 " PM   Result Value Ref Range    Color, UA yellow     Spec Grav UA 1.010     pH, UA 5.0     WBC, UA neg     Nitrite, UA neg     Protein trace     Glucose, UA neg     Ketones, UA neg     Urobilinogen, UA neg     Bilirubin neg     Blood, UA neg    Urinalysis Microscopic    Collection Time: 07/24/17  3:22 PM   Result Value Ref Range    RBC, UA 0 0 - 4 /hpf    Hyaline Casts, UA 0 0-1/lpf /lpf    Microscopic Comment SEE COMMENT          Assessment/Diagnosis:    1. Lupus nephritis     2. Prostate cancer screening  POCT URINE DIPSTICK WITHOUT MICROSCOPE    POCT Bladder Scan    PSA, Screening       Plans:  Remote cytoxan use history.  Never experienced hemorrhagic cystitis or gross hematuria.  No documented microhematuria - in fact all urine specimens have been negative for RBCs.  Discussed microhematuria as >3-5 RBC/hpf on UA micro which would get worked up with Ucx, CT urogram, cystoscopy.  As he does not have MH, did not advise workup at this time.  Discussed cystoscopy in detail, and given both absence of clinically significant microhematuria and patient preference to not undergo unnecessary invasive procedures, deferred.  Without gross or microscopic hematuria, no urologic contraindication to proceeding with renal transplant workup.  Will see him back in one year for annual prostate cancer screening.

## 2017-07-31 NOTE — LETTER
August 3, 2017      Jesenia White, NP  [address] [place] [address]           [facility]  [address]  [facility] [address]  Phone: [phone]          Patient: Spike Estrella   MR Number: [id number]   YOB: 1965   Date of Visit: 7/31/2017       Dear Jesenia White:    Thank you for referring Spike Estrella to me for evaluation. Attached you will find relevant portions of my assessment and plan of care.    If you have questions, please do not hesitate to call me. I look forward to following Spike Estrella along with you.    Sincerely,    Epifanio Lundberg  CC:  No Recipients    If you would like to receive this communication electronically, please contact [email or web address] or [phone] to request more information on Group Therapy Records Link access.    For providers and/or their staff who would like to refer a patient to [facility], please contact us through our one-stop-shop provider referral line, Emy Chapman, at [phone].    If you feel you have received this communication in error or would no longer like to receive these types of communications, please e-mail [email or web address]

## 2017-08-07 ENCOUNTER — CLINICAL SUPPORT (OUTPATIENT)
Dept: INFECTIOUS DISEASES | Facility: CLINIC | Age: 52
End: 2017-08-07
Payer: COMMERCIAL

## 2017-08-07 DIAGNOSIS — Z23 NEED FOR VACCINATION: Primary | ICD-10-CM

## 2017-08-07 PROCEDURE — 90471 IMMUNIZATION ADMIN: CPT | Mod: S$GLB,TXP,, | Performed by: INTERNAL MEDICINE

## 2017-08-07 PROCEDURE — 90740 HEPB VACC 3 DOSE IMMUNSUP IM: CPT | Mod: S$GLB,TXP,, | Performed by: INTERNAL MEDICINE

## 2017-08-15 ENCOUNTER — TELEPHONE (OUTPATIENT)
Dept: TRANSPLANT | Facility: CLINIC | Age: 52
End: 2017-08-15

## 2017-08-15 NOTE — TELEPHONE ENCOUNTER
----- Message from Jen Morin sent at 8/14/2017  4:08 PM CDT -----  Contact: PT      ----- Message -----  From: Amauri Reynolds  Sent: 8/14/2017   3:54 PM  To: [facility] Pre-Kidney Transplant Non-Clinical    PT has a few questions regarding his status,     Call [phone]

## 2017-08-15 NOTE — TELEPHONE ENCOUNTER
I spoke with Ada at Dr Bernheim's office and she states pt has a recall date of 2/7/2019 for repeat colonoscopy.

## 2017-09-12 ENCOUNTER — TELEPHONE (OUTPATIENT)
Dept: TRANSPLANT | Facility: CLINIC | Age: 52
End: 2017-09-12

## 2017-09-12 NOTE — TELEPHONE ENCOUNTER
Chart review for transplant workup reveals still no clearance from rheumatology or cardiology. I spoke with Dr Butts's office and refaxed for the 3rd time a request for clearance. I also spoke with dr Miller's office who states pt has not been seen since heart cath and will needs an appt. They will call to get this scheduled. I spoke with Spike to give him an update. He will follow up with his cardiologist.

## 2017-09-21 ENCOUNTER — TELEPHONE (OUTPATIENT)
Dept: TRANSPLANT | Facility: CLINIC | Age: 52
End: 2017-09-21
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
## 2017-10-06 ENCOUNTER — COMMITTEE REVIEW (OUTPATIENT)
Dept: TRANSPLANT | Facility: CLINIC | Age: 52
End: 2017-10-06

## 2017-10-06 NOTE — LETTER
October 6, 2017    Oanh López MD  [address] [place] [address],  [place] [address]         Dear Dr. López:    Patient: Spike Estrella   MR Number: [id number]   YOB: 1965     Your patient, Spike Estrella, was recently discussed at the [facility] Kidney Selection Committee meeting on 10/6/2017. I am happy to inform you that Spike has been approved for transplantation.  He has met selection criteria for a kidney transplant related to CKD stage 4 secondary to primary diagnosis of Systemic Lupus Erythematosus. Your patient will be placed on the cadaveric wait list pending final financial approval from insurance company.     We appreciate your confidence in allowing us to participate in your patients care.  If you have any questions or concerns, please do not hesitate to contact me.    Sincerely,      Nyla Aggarwal MD  Medical Director, Kidney & Kidney/Pancreas Transplantation  lh    Mailed copy to patient:  Mr. Spike Estrella

## 2017-10-06 NOTE — COMMITTEE REVIEW
Native Organ Dx: Systemic Lupus Erythematosus      SELECTION COMMITTEE NOTE    Spike Estrella was presented at selection committee on 10/6/2017.  Patient met selection criteria for kidney transplant related to CKD, Stage 4 due to    Systemic Lupus Erythematosus.  No absolute contraindications to transplant at this time.  Patient will be placed on the cadaveric wait list pending final financial approval from insurance company.  Patient will return to clinic for routine appointment in 1 year(s). Patient does not meet criteria for High KDPI kidney offer secondary to weight .            Note written by Marcella Mobley RN    ===============================================  I was present at the meeting and attest to the decision of the committee.

## 2017-10-13 ENCOUNTER — TELEPHONE (OUTPATIENT)
Dept: TRANSPLANT | Facility: CLINIC | Age: 52
End: 2017-10-13

## 2017-10-13 DIAGNOSIS — Z76.82 AWAITING ORGAN TRANSPLANT STATUS: Primary | ICD-10-CM

## 2017-10-13 NOTE — TELEPHONE ENCOUNTER
KIDNEY WAIT LISTING NOTE    Date of Financial clearance to list: 10/12/17    [facility]:     Organ: Kidney  Name:       Spike Estrella   : 1965          Gender:     male    MRN#: [id number]                                 State of Permanent Residence:  [address] [place] [address]  [place] [address]  Ethnicity: /White   Race:      White    CLINICAL INFORMATION   Candidate Medical Urgency Status: Active  Number of Previous Kidney Transplants:   Number of Previous Solid Organ Transplants:   Did you enter number of previous kidney or other solid organ transplants? no  Is this Candidate a Prior Living Donor: no  (If yes, please generate letter to [employer] with patient's date of donation, recipient SSN, signed by Surgical Director after patient is listed in order to receive priority points).      ABO  ABO Blood Group:   O NEG     ABO Confirmation: (THESE DATES MUST BE PRIOR TO THE LIST DATE AND SUPPORTED BY SEPARATE LAB REPORTS)    Internal Results    Lab Results   Component Value Date    GROUPTRH O NEG 07/10/2017    GROUPTRH O NEG 2017     No results found for: ABO    External Results    ABO Date 1: 17 ABO Date 2  17  Are either of these ABO results based on External Labs? no  (If Yes, STOP and go to source document in Media Tab for verification).    VITALS  Height:  6'1  Weight:  96.7  (Use height from Transplant clinic visits only).  Did you enter height/weight? Yes    HLA    Class I:  Lab Results   Component Value Date    YKRQ0PF 1 2017    QEBM5CJ 2 2017    SVJH9EY 35 2017    SOXI6UY 49 2017    VQYXB3EV 6 2017    EQMCR2KC 4 2017    OTGUI9KA 4 2017    FYEKJ5EP 7 2017       Class II:  Lab Results   Component Value Date    RRAXQL63FL 103 2017    MECEDD73QD 13 2017    PGEWKM488NH 52 2017    OFKGNS4701 XX 2017    UKKXA5KK 5 2017    OTBWF4BD 6 2017       Tested for HLA Antibodies: Yes, no antibodies detected     If  "result is "Positive" antibodies are detected     If result is "Negative or questionable" no antibodies detected    Lab Results   Component Value Date    CIPRAS Negative 06/27/2017    CIIPRAS Negative 06/27/2017       DIALYSIS INFORMATION  Is patient Pre-Dialysis: Yes     Report GFR being used as the criteria for placement on the kidney list. If not, leave blank  GFR < or = 20 ml/min? Yes  If Yes, Specify value  _17.0__   ml/min     Initial date GFR became 20 or less: 6/27/17  Is GFR obtained from an Outside lab Result? No  (If YES verify with source document scanned into media)    If patient on Dialysis:    Is candidate currently on dialysis for ESRD? No  If Yes,  Date Chronic Dialysis Started:   (Not currently on dialysis)  (verify with source document in Media Tab)   Dialysis Unit Name: (Not currently on dialysis)                        Physician Name:  Dr. Nyla Henao  NPI#: [id number]    DIABETES INFORMATION  Primary Native Kidney Diagnosis: Systemic Lupus Erythematosus  C-Peptide Value - No results found for: CPEPTIDE  Current Diabetes Status: None    FOR NON-KIDNEY DEPARTMENT USE ONLY:  Additional Organs Registered? none    Maximum Acceptable Number of HLA Mismatches  ABDR:     6      (0-6)               AB:               (0-4)  ADR:   _____  (0-4)              BDR: _____ (0-4)  A:        _____  (0-2)              B:      _____ (0-2)          DR: ______ (0-2)    Will Recipient Accept?   Accept HBcAB Positive Organ:            Yes  Accept HBV JUNIOR Positive Organ:        no  Accept HCV Antibody Positive Organ: no   Accept HCV JUNIOR Positive Organ: no  Accept KDPI > 85 Donor ?: No                        Local: No                           Import: No    ### NURSE TO VERIFY CONSENT AND MAKE ANY NECESSARY CHANGES NEEDED IN UNET AT THE TIME OF VERIFICATION ###    Unacceptible Antigens  If yes, list     No results found for: UR9ONKY, CIABCLM, CIIAB, ABCMT    ### DO NOT LIST IF ANTIGEN VALUE WEAK ###    ### DO NOT " LIST ANTIGEN AS UNACCEPTABLE IF IT HAS * OR : IN THE RESULT  VALUE ###

## 2017-10-13 NOTE — LETTER
October 13, 2017    Spike Estrella  [address] [place] [address]      Dear Spike Estrella:  MRN: [id number]    Congratulations! As of 10/13/2017, your name has been placed on the cadaveric kidney waiting list at [facility].  Your candidacy for kidney transplant is based on the following criteria: Systemic Lupus Erythematosis.    If you have any friends or family members interested in donating a kidney to you, please have them call the donor coordinator.  If you are a pre-dialysis patient or if you dialyze at home monthly transplant blood kits will be sent directly to you.  If you dialyze at a dialysis center the kits will be sent to your dialysis unit.      While active on the list, you must keep us notified of any changes in your health status.  Should your medical condition change and transplantation is no longer a viable option, it may be necessary to deactivate your status or to remove you from the [employer] list.    Please notify your coordinator when there are changes to your telephone number, address, insurance coverage, or dialysis unit.  If you have any alternate phone numbers that you would like us to have, please let us know.  Your Listed Transplant Coordinator will be Kash Joshua RN.  Feel free to call your coordinator at [phone] or [phone] should you have any questions.    The Center for Medicaid Services and the [employer] for Organ Sharing requires that we inform any patient placed on our waiting list of information that would impact their ability to receive a transplant.  [facility]s Kidney and Kidney/Pancreas transplant program has a transplant surgeon and physician available 365 days a year, 24 hours a day, and 7 days a week to facilitate organ acceptance, procurement, and implantation, and to address urgent patient issues.  You will be notified in writing of any changes to our key personnel and of any changes to our staffing plan that would impact your ability to receive a  transplant.    Attached is a letter from the [employer] for Organ Sharing (UN[employer]).  It describes the services and information offered to patients by [employer] and the Organ Procurement and Transplant Network.    Again, we congratulate you on your success and look forward to working with you very closely in the future.    Sincerely,    Nyla Aggarwal M.D.                                                Medical Director, Kidney and Kidney/Pancreas Transplant  l[facility] [address] [address]  [phone]    Faxed to: Oanh López MD          OPTN/UNOS: Your Resource for Organ Transplant Information        If you have a question regarding your own medical care, you always should call your transplant center first. However, for general organ transplant-related information, you can call the [employer] for Organ Sharing (UNOS) toll-free patient services line at 1-451.208.6351.    Anyone, including potential transplant candidates, recipients, family members/friends, living donors, and/or donor family members can call this number to:    · talk about organ donation, living donation, how transplant and donation work, the donation process, transplant policies, and transplant/donor information;  · get a free patient information kit with helpful booklets, waiting list and transplant information, and a list of all transplant centers;  · ask questions about the Organ Procurement and Transplantation Network (OPTN) web site (www.optn.transplant.hrsa.gov); the UNOS Web site (www.unos.org); or the UNOS web site for living donors and transplant recipients (www.transplantliving.org);  · learn how UNOS and the OPTN can help you;  · talk about any concerns that you may have with a transplant center and how they perform    UNOS is a not-for-profit organization that provides all of the administrative services for the national OPTN under federal contract to the  Health Resources and Services Administration (HRSA), an agency under the U.S. Department of Health and Human Services (HHS).     UNOS and OPTN responsibilities include:    · writing educational material for patients, the public and professionals;  · helping to make people aware of the need for donated organs and tissue;  · writing organ transplant policy with help from doctors, nurses, transplant patients/candidates, donor families, living donors, and the public;  · coordinating the organ matching and placement process;  · collecting information about every organ transplant and donation that occurs in the [place].    Remember, you should contact your transplant center directly if you have questions or concerns about your own medical care including medical records, work-up progress and test reports. [facility] is not your transplant center, and staff at [facility] will not be able to transfer you to your transplant center, so keep your transplant centers phone number handy. But, while you research your transplant needs and learn as much as you can about transplantation and donation, we welcome your call to our toll-free patient services line at [phone].

## 2017-10-16 DIAGNOSIS — Z76.82 AWAITING ORGAN TRANSPLANT STATUS: Primary | ICD-10-CM

## 2017-10-21 ENCOUNTER — LAB VISIT (OUTPATIENT)
Dept: LAB | Facility: HOSPITAL | Age: 52
End: 2017-10-21
Attending: INTERNAL MEDICINE
Payer: COMMERCIAL

## 2017-10-21 DIAGNOSIS — Z76.82 AWAITING ORGAN TRANSPLANT STATUS: ICD-10-CM

## 2017-10-21 PROCEDURE — 86832 HLA CLASS I HIGH DEFIN QUAL: CPT | Mod: PO,TXP

## 2017-10-21 PROCEDURE — 86833 HLA CLASS II HIGH DEFIN QUAL: CPT | Mod: PO,TXP

## 2017-11-04 ENCOUNTER — LAB VISIT (OUTPATIENT)
Dept: LAB | Facility: HOSPITAL | Age: 52
End: 2017-11-04
Attending: INTERNAL MEDICINE
Payer: COMMERCIAL

## 2017-11-04 DIAGNOSIS — Z76.82 AWAITING ORGAN TRANSPLANT STATUS: ICD-10-CM

## 2017-11-04 PROCEDURE — 99001 SPECIMEN HANDLING PT-LAB: CPT | Mod: PO,TXP

## 2017-11-15 LAB — HPRA INTERPRETATION: NORMAL

## 2017-11-20 LAB
CLASS I ANTIBODIES - LUMINEX: NORMAL
CLASS I ANTIBODY COMMENTS - LUMINEX: NORMAL
CLASS II ANTIBODIES - LUMINEX: NORMAL
CLASS II ANTIBODY COMMENTS - LUMINEX: NORMAL
CPRA %: 0
SERUM COLLECTION DT - LUMINEX CLASS I: NORMAL
SERUM COLLECTION DT - LUMINEX CLASS II: NORMAL
SPCL1 TESTING DATE: NORMAL
SPCL2 TESTING DATE: NORMAL
SPCLU TESTING DATE: NORMAL

## 2017-11-24 LAB
HLA FREEZE AND HOLD INTERPRETATION: NORMAL
HPRA INTERPRETATION: NORMAL

## 2018-01-02 ENCOUNTER — LAB VISIT (OUTPATIENT)
Dept: LAB | Facility: HOSPITAL | Age: 53
End: 2018-01-02
Attending: UROLOGY
Payer: COMMERCIAL

## 2018-01-02 ENCOUNTER — CLINICAL SUPPORT (OUTPATIENT)
Dept: INFECTIOUS DISEASES | Facility: CLINIC | Age: 53
End: 2018-01-02
Payer: COMMERCIAL

## 2018-01-02 DIAGNOSIS — Z12.5 PROSTATE CANCER SCREENING: ICD-10-CM

## 2018-01-02 DIAGNOSIS — Z76.82 AWAITING ORGAN TRANSPLANT STATUS: ICD-10-CM

## 2018-01-02 LAB — COMPLEXED PSA SERPL-MCNC: 0.72 NG/ML

## 2018-01-02 PROCEDURE — 90740 HEPB VACC 3 DOSE IMMUNSUP IM: CPT | Mod: S$GLB,TXP,, | Performed by: INTERNAL MEDICINE

## 2018-01-02 PROCEDURE — 84153 ASSAY OF PSA TOTAL: CPT | Mod: TXP

## 2018-01-02 PROCEDURE — 90472 IMMUNIZATION ADMIN EACH ADD: CPT | Mod: S$GLB,TXP,, | Performed by: INTERNAL MEDICINE

## 2018-01-02 PROCEDURE — 36415 COLL VENOUS BLD VENIPUNCTURE: CPT | Mod: TXP

## 2018-01-02 PROCEDURE — 90471 IMMUNIZATION ADMIN: CPT | Mod: S$GLB,TXP,, | Performed by: INTERNAL MEDICINE

## 2018-01-02 PROCEDURE — 90632 HEPA VACCINE ADULT IM: CPT | Mod: S$GLB,TXP,, | Performed by: INTERNAL MEDICINE

## 2018-01-02 PROCEDURE — 99999 PR PBB SHADOW E&M-EST. PATIENT-LVL I: CPT | Mod: PBBFAC,TXP,,

## 2018-01-02 PROCEDURE — 86833 HLA CLASS II HIGH DEFIN QUAL: CPT | Mod: PO,TXP

## 2018-01-02 PROCEDURE — 86832 HLA CLASS I HIGH DEFIN QUAL: CPT | Mod: PO,TXP

## 2018-01-10 LAB — HPRA INTERPRETATION: NORMAL

## 2018-01-11 LAB
CLASS I ANTIBODIES - LUMINEX: NORMAL
CLASS I ANTIBODY COMMENTS - LUMINEX: NORMAL
CLASS II ANTIBODIES - LUMINEX: NORMAL
CPRA %: 0
SERUM COLLECTION DT - LUMINEX CLASS I: NORMAL
SERUM COLLECTION DT - LUMINEX CLASS II: NORMAL
SPCL1 TESTING DATE: NORMAL
SPCL2 TESTING DATE: NORMAL
SPCLU TESTING DATE: NORMAL

## 2018-02-03 ENCOUNTER — LAB VISIT (OUTPATIENT)
Dept: LAB | Facility: HOSPITAL | Age: 53
End: 2018-02-03
Attending: INTERNAL MEDICINE
Payer: COMMERCIAL

## 2018-02-03 DIAGNOSIS — Z76.82 AWAITING ORGAN TRANSPLANT STATUS: ICD-10-CM

## 2018-02-03 PROCEDURE — 99001 SPECIMEN HANDLING PT-LAB: CPT | Mod: PO,TXP

## 2018-02-15 LAB
HLA FREEZE AND HOLD INTERPRETATION: NORMAL
HLAFH COLLECTION DATE: NORMAL
HPRA INTERPRETATION: NORMAL

## 2018-03-10 ENCOUNTER — LAB VISIT (OUTPATIENT)
Dept: LAB | Facility: HOSPITAL | Age: 53
End: 2018-03-10
Attending: INTERNAL MEDICINE
Payer: COMMERCIAL

## 2018-03-10 DIAGNOSIS — Z76.82 AWAITING ORGAN TRANSPLANT STATUS: ICD-10-CM

## 2018-03-10 PROCEDURE — 99001 SPECIMEN HANDLING PT-LAB: CPT | Mod: PO,TXP

## 2018-04-04 DIAGNOSIS — Z76.82 AWAITING ORGAN TRANSPLANT STATUS: Primary | ICD-10-CM

## 2018-04-07 ENCOUNTER — LAB VISIT (OUTPATIENT)
Dept: LAB | Facility: HOSPITAL | Age: 53
End: 2018-04-07
Attending: INTERNAL MEDICINE
Payer: COMMERCIAL

## 2018-04-07 DIAGNOSIS — Z76.82 AWAITING ORGAN TRANSPLANT STATUS: ICD-10-CM

## 2018-04-07 PROCEDURE — 86833 HLA CLASS II HIGH DEFIN QUAL: CPT | Mod: PO,TXP

## 2018-04-07 PROCEDURE — 86832 HLA CLASS I HIGH DEFIN QUAL: CPT | Mod: PO,TXP

## 2018-05-05 ENCOUNTER — LAB VISIT (OUTPATIENT)
Dept: LAB | Facility: HOSPITAL | Age: 53
End: 2018-05-05
Attending: INTERNAL MEDICINE
Payer: COMMERCIAL

## 2018-05-05 DIAGNOSIS — Z76.82 AWAITING ORGAN TRANSPLANT STATUS: ICD-10-CM

## 2018-05-05 PROCEDURE — 99001 SPECIMEN HANDLING PT-LAB: CPT | Mod: PO,TXP

## 2018-05-07 LAB — HPRA INTERPRETATION: NORMAL

## 2018-05-10 LAB
CLASS I ANTIBODY COMMENTS - LUMINEX: NORMAL
CLASS II ANTIBODIES - LUMINEX: NORMAL
CPRA %: 0
SERUM COLLECTION DT - LUMINEX CLASS I: NORMAL
SERUM COLLECTION DT - LUMINEX CLASS II: NORMAL
SPCL1 TESTING DATE: NORMAL
SPCL2 TESTING DATE: NORMAL
SPCLU TESTING DATE: NORMAL

## 2018-06-01 ENCOUNTER — LAB VISIT (OUTPATIENT)
Dept: LAB | Facility: HOSPITAL | Age: 53
End: 2018-06-01
Attending: INTERNAL MEDICINE
Payer: COMMERCIAL

## 2018-06-01 DIAGNOSIS — Z76.82 AWAITING ORGAN TRANSPLANT STATUS: ICD-10-CM

## 2018-06-01 PROCEDURE — 99001 SPECIMEN HANDLING PT-LAB: CPT | Mod: PO,TXP

## 2018-07-07 ENCOUNTER — LAB VISIT (OUTPATIENT)
Dept: LAB | Facility: HOSPITAL | Age: 53
End: 2018-07-07
Attending: INTERNAL MEDICINE
Payer: COMMERCIAL

## 2018-07-07 DIAGNOSIS — Z76.82 AWAITING ORGAN TRANSPLANT STATUS: ICD-10-CM

## 2018-07-07 PROCEDURE — 86832 HLA CLASS I HIGH DEFIN QUAL: CPT | Mod: PO,TXP

## 2018-07-07 PROCEDURE — 86833 HLA CLASS II HIGH DEFIN QUAL: CPT | Mod: PO,TXP

## 2018-07-24 LAB — HPRA INTERPRETATION: NORMAL

## 2018-07-25 LAB
CLASS I ANTIBODY COMMENTS - LUMINEX: NORMAL
CLASS II ANTIBODIES - LUMINEX: NEGATIVE
CPRA %: 0
SERUM COLLECTION DT - LUMINEX CLASS I: NORMAL
SERUM COLLECTION DT - LUMINEX CLASS II: NORMAL
SPCL1 TESTING DATE: NORMAL
SPCL2 TESTING DATE: NORMAL
SPCLU TESTING DATE: NORMAL

## 2018-08-04 ENCOUNTER — LAB VISIT (OUTPATIENT)
Dept: LAB | Facility: HOSPITAL | Age: 53
End: 2018-08-04
Attending: INTERNAL MEDICINE
Payer: COMMERCIAL

## 2018-08-04 DIAGNOSIS — Z76.82 AWAITING ORGAN TRANSPLANT STATUS: ICD-10-CM

## 2018-08-04 PROCEDURE — 99001 SPECIMEN HANDLING PT-LAB: CPT | Mod: TXP

## 2018-09-01 ENCOUNTER — LAB VISIT (OUTPATIENT)
Dept: LAB | Facility: HOSPITAL | Age: 53
End: 2018-09-01
Attending: INTERNAL MEDICINE
Payer: COMMERCIAL

## 2018-09-01 DIAGNOSIS — Z76.82 AWAITING ORGAN TRANSPLANT STATUS: ICD-10-CM

## 2018-09-01 PROCEDURE — 99001 SPECIMEN HANDLING PT-LAB: CPT | Mod: PO,TXP

## 2018-09-05 DIAGNOSIS — Z76.82 ORGAN TRANSPLANT CANDIDATE: Primary | ICD-10-CM

## 2018-09-05 NOTE — PROGRESS NOTES
YEARLY LIST MANAGEMENT NOTE    Spike Estrella's kidney transplant listing status reviewed.  Patient is due for follow-up appointments in October 2018.  Appointments will be scheduled per protocol.  HLA sample is current and being rec'd on a regular basis.

## 2018-09-21 ENCOUNTER — HOSPITAL ENCOUNTER (OUTPATIENT)
Dept: RADIOLOGY | Facility: HOSPITAL | Age: 53
Discharge: HOME | End: 2018-09-21
Attending: NURSE PRACTITIONER
Payer: COMMERCIAL

## 2018-09-21 ENCOUNTER — HOSPITAL ENCOUNTER (OUTPATIENT)
Dept: CARDIOLOGY | Facility: CLINIC | Age: 53
Discharge: HOME | End: 2018-09-21
Attending: NURSE PRACTITIONER
Payer: COMMERCIAL

## 2018-09-21 ENCOUNTER — OFFICE VISIT (OUTPATIENT)
Dept: TRANSPLANT | Facility: CLINIC | Age: 53
End: 2018-09-21
Payer: COMMERCIAL

## 2018-09-21 VITALS
SYSTOLIC BLOOD PRESSURE: 158 MMHG | WEIGHT: 225.31 LBS | TEMPERATURE: 98 F | HEART RATE: 68 BPM | HEIGHT: 73 IN | RESPIRATION RATE: 16 BRPM | OXYGEN SATURATION: 100 % | BODY MASS INDEX: 29.86 KG/M2 | DIASTOLIC BLOOD PRESSURE: 100 MMHG

## 2018-09-21 DIAGNOSIS — Z76.82 ORGAN TRANSPLANT CANDIDATE: ICD-10-CM

## 2018-09-21 DIAGNOSIS — N18.4 STAGE 4 CHRONIC KIDNEY DISEASE: Primary | Chronic | ICD-10-CM

## 2018-09-21 DIAGNOSIS — D63.1 ANEMIA OF RENAL DISEASE: Chronic | ICD-10-CM

## 2018-09-21 DIAGNOSIS — N25.81 SECONDARY HYPERPARATHYROIDISM OF RENAL ORIGIN: Chronic | ICD-10-CM

## 2018-09-21 DIAGNOSIS — Z76.82 AWAITING ORGAN TRANSPLANT STATUS: ICD-10-CM

## 2018-09-21 DIAGNOSIS — E87.20 METABOLIC ACIDOSIS: Chronic | ICD-10-CM

## 2018-09-21 DIAGNOSIS — N18.9 ANEMIA OF RENAL DISEASE: Chronic | ICD-10-CM

## 2018-09-21 DIAGNOSIS — I10 ESSENTIAL HYPERTENSION: Chronic | ICD-10-CM

## 2018-09-21 DIAGNOSIS — M32.14 LUPUS NEPHRITIS: ICD-10-CM

## 2018-09-21 DIAGNOSIS — M32.14 SYSTEMIC LUPUS ERYTHEMATOSUS WITH GLOMERULAR DISEASE, UNSPECIFIED SLE TYPE: ICD-10-CM

## 2018-09-21 DIAGNOSIS — I34.9 NONRHEUMATIC MITRAL VALVE DISORDER: ICD-10-CM

## 2018-09-21 DIAGNOSIS — Z95.2 HX OF AORTIC VALVE REPLACEMENT: ICD-10-CM

## 2018-09-21 LAB
DIASTOLIC DYSFUNCTION: NO
ESTIMATED PA SYSTOLIC PRESSURE: 21.66
MITRAL VALVE REGURGITATION: NORMAL
RETIRED EF AND QEF - SEE NOTES: 65 (ref 55–65)

## 2018-09-21 PROCEDURE — 71046 X-RAY EXAM CHEST 2 VIEWS: CPT | Mod: TC,FY,TXP

## 2018-09-21 PROCEDURE — 99999 PR PBB SHADOW E&M-EST. PATIENT-LVL IV: CPT | Mod: PBBFAC,TXP,, | Performed by: INTERNAL MEDICINE

## 2018-09-21 PROCEDURE — 3077F SYST BP >= 140 MM HG: CPT | Mod: CPTII,S$GLB,TXP, | Performed by: INTERNAL MEDICINE

## 2018-09-21 PROCEDURE — 71046 X-RAY EXAM CHEST 2 VIEWS: CPT | Mod: 26,TXP,, | Performed by: RADIOLOGY

## 2018-09-21 PROCEDURE — 3008F BODY MASS INDEX DOCD: CPT | Mod: CPTII,S$GLB,TXP, | Performed by: INTERNAL MEDICINE

## 2018-09-21 PROCEDURE — 99215 OFFICE O/P EST HI 40 MIN: CPT | Mod: S$GLB,TXP,, | Performed by: INTERNAL MEDICINE

## 2018-09-21 PROCEDURE — 3080F DIAST BP >= 90 MM HG: CPT | Mod: CPTII,S$GLB,TXP, | Performed by: INTERNAL MEDICINE

## 2018-09-21 PROCEDURE — 93306 TTE W/DOPPLER COMPLETE: CPT | Mod: S$GLB,TXP,, | Performed by: INTERNAL MEDICINE

## 2018-09-21 RX ORDER — LOSARTAN POTASSIUM 50 MG/1
50 TABLET ORAL DAILY
COMMUNITY
End: 2019-09-17

## 2018-09-21 NOTE — PROGRESS NOTES
Kidney Transplant Recipient Reevalulation    Referring Physician: Oanh López  Current Nephrologist: Oanh López  Waitlist Status: active  Dialysis Start Date: (Not currently on dialysis)    Subjective:     CC:  Annual reassessment of kidney transplant candidacy.    HPI:  Mr. Estrella is a 53 y.o. 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  He has been on the wait list for a kidney transplant at [facility] since 10/13/2017. Patient is currently pre-dialysis. He doesn't have a dialysis access. Patient was last seen in initial RR clinic on 6/27/17. He denies any recent hospitalizations but was seen in the ED for hyperkalemia in May 2018.    States home BPs were in the 140s/80s last week.     He brought labs done on 9/11/18 which showed his Cr was 3.29, eGFR 20 mL/min; ; potassium 5.5.     He doesn't exercise formally but states he is active dong yard work (of 15 acre property); and works in a 2 story building and his office is on the second floor thus is climbing stairs frequently. With the activity that he does he denies any symptoms of chest pain, SOB, claudication. He does occasionally get cramps in his legs at night. He is still working 56 hours a week.     He is accompanied to visit by his wife.     Review of Systems   Constitutional: +fatigue; Denies fever/chills, night sweats  EENT: +wears eye glasses; +certain pitch hearing loss; Denies trouble swallowing.   Respiratory: +sinus congestion; Denies shortness of breath, dyspnea on exertion, orthopnea, wheezing, hemoptysis, denies known TB exposure or history of positive TB skin test  Cardiovascular: Denies chest pain, palpitations, history of MI, history of stroke, history of DVT  Gastrointestinal: Denies abdominal pain, nausea/vomiting/diarrhea/constipation. Denies history of GI bleeding or ulcers.   Genitourinary: +microscopic hematuria; +foamy urine; ?kidney  "stone - renal colic symptoms but never was detected on imaging and did not pass it in ~2000; Denies recurrent UTI's, history of urinary obstruction, dysuria, urinary frequency, incontinence  Musculoskeletal: Denies trouble moving extremities, denies joint pain or swelling  Skin: +Raynaud's; Denies history of skin cancer, denies rash  Heme/onc: +bruises easily; Denies any history of cancer  Endocrine: +gained 12 lbs since last transplant clinic visit; Denies thyroid disease  Neurological: +occasional headaches; Denies tremors, seizures, dizziness, peripheral neuropathy  Psychiatric: Denies anxiety, depression. Denies hallucinations or delusions.    Potential Donors: all 4 of his brothers and wife     Medications:  Current Outpatient Medications   Medication Sig Dispense Refill    aspirin (ECOTRIN) 325 MG EC tablet Take 1 tablet (325 mg total) by mouth once daily. 30 tablet 3    furosemide (LASIX) 20 MG tablet Take 20 mg by mouth 2 (two) times daily.      hydroxychloroquine (PLAQUENIL) 200 mg tablet Take 200 mg by mouth once daily.   11    losartan (COZAAR) 50 MG tablet Take 50 mg by mouth once daily.      metoprolol succinate (TOPROL-XL) 50 MG 24 hr tablet Take 50 mg by mouth once daily.      multivitamin (THERAGRAN) per tablet Take 1 tablet by mouth once daily.      mycophenolate (CELLCEPT) 500 mg Tab 1,000 mg 2 (two) times daily.   3     No current facility-administered medications for this visit.      *takes lasix 20 mg daily     Objective:   body mass index is 29.73 kg/m².   BP (!) 158/100 (BP Location: Right arm, Patient Position: Sitting, BP Method: Medium (Automatic))   Pulse 68   Temp 97.9 °F (36.6 °C) (Oral)   Resp 16   Ht 6' 1" (1.854 m)   Wt 102.2 kg (225 lb 5 oz)   SpO2 100%   BMI 29.73 kg/m²       Physical Exam  General: No acute distress, well groomed, alert and oriented x 3  HEENT: Normocephalic, atraumatic, PERRLA, sclera anicteric, conjunctiva/corneas clear, EOM's intact bilaterally, " external inspection of ears and nose normal, moist mucous membranes, no oral ulcerations/lesions   Neck: Supple, symmetrical, trachea midline, no masses, no carotid bruits, no JVD, thyroid is normal without nodules or enlargement   Respiratory: Clear to auscultation bilaterally, respirations unlabored, no rales/rhonchi/wheezing   Cardiovacular: Regular rate and rhythm, S1, S2 normal, no murmurs, no rubs or gallops   Gastrointestinal: Soft, non-tender, bowel sounds normal, no masses, no palpable organomegaly  Extremities: No clubbing or cyanosis of upper extremities bilaterally, no pedal edema bilaterally; +2 bilateral radial pulses  Skin: warm and dry; no rash on exposed skin; scratch marks along right shin with some surrounding erythema  Lymph nodes: Cervical and supraclavicular nodes normal   Neurologic: No focal neurologic deficits.   Musculoskeletal: moves all extremities without difficulty, FROM, 5/5 strength, ambulates without an assistive device  Psychiatric: Normal mood and affect. Responds appropriately to questions.    Labs:  Lab Results   Component Value Date    WBC 9.96 06/27/2017    HGB 13.3 (L) 06/27/2017    HCT 42.3 06/27/2017     06/27/2017    K 4.8 06/27/2017     06/27/2017    CO2 20 (L) 06/27/2017    BUN 62 (H) 06/27/2017    CREATININE 3.8 (H) 06/27/2017    EGFRNONAA 17.0 (A) 06/27/2017    CALCIUM 10.0 06/27/2017    PHOS 4.7 (H) 06/27/2017    MG 2.2 06/19/2014    ALBUMIN 4.0 06/27/2017    AST 33 06/27/2017    ALT 21 06/27/2017    UTPCR 0.72 (H) 06/27/2017    PTH 81.0 (H) 06/27/2017       Lab Results   Component Value Date    BILIRUBINUA Negative 06/27/2017    PROTEINUA 1+ (A) 06/27/2017    NITRITE neg 07/24/2017    RBCUA 0 07/24/2017    WBCUA 0 06/27/2017       No results found for: HLAABCTYPE    Lab Results   Component Value Date    CPRA 0 07/07/2018    CPRA 0 07/07/2018    CPRA 0 07/07/2018    MD5IZTS B54 01/02/2018    CIABCLM WEAK-- CW6, B63, 07/07/2018    CIIAB Negative 07/07/2018     ABCMT WEAK DP5 10/21/2017       Labs were reviewed with the patient.    Pre-transplant Workup:   Reviewed with the patient.    Assessment:     1. Stage 4 chronic kidney disease    2. Secondary hyperparathyroidism of renal origin    3. Metabolic acidosis    4. Lupus nephritis    5. Essential hypertension    6. Anemia of renal disease    7. Hx of aortic valve replacement    8. Systemic lupus erythematosus with glomerular disease, unspecified SLE type    9. Awaiting organ transplant status        Plan:     Transplant Candidacy:   Mr. Estrella is a suitable kidney transplant candidate.  He remains in overall stable health, and will remain active on the transplant list.    Discussed with patient and wife that his GFR did improve from 17 mL/min at time of initial RR clinic visit last year to 20 mL/min on labs from 9/11/18 and that if his kidney function remained at this level or got better he may be placed on hold for kidney transplant. They displayed understanding.     Exercise: reminded Spike of the importance of regular exercise for weight management, blood sugar and blood pressure management.  I also explained exercise has been shown to improve cardiovascular health, energy level, and sleep hygiene.  Lastly, I advised him that cardiovascular complications are leading cause of death and regular exercise can help lower this risk.    Viola Culp MD       Follow-up:   In addition to the tests noted in the plan, Mr. Estrella will continue to have reevaluation as per the standing pre-kidney transplant protocol:  1. Monthly blood for PRA  2. Annual return to clinic, except HIV positive, > 65 years of age, or pancreas transplant candidates who will be scheduled to see transplant every 6 months while in pre-transplant phase  3. Annual re-testing: CXR, EKG, yearly mammograms for women over 40 and PSA for males over 40, cardiology follow-up as recommended by initial cardiology pre-transplant evaluation  4. Renal ultrasound  every 2 years  5. Baseline colonoscopy after age 50 and repeated as recommended    UNOS Patient Status  Functional Status: 80% - Normal activity with effort: some symptoms of disease  Physical Capacity: No Limitations

## 2018-09-21 NOTE — LETTER
September 21, 2018        Oanh López  [address] [facility] [place] [address]  Phone: [phone]  Fax: [phone]             [facility]  [address]  [facility] [address]  Phone: [phone]   Patient: Spike Estrella   MR Number: [id number]   YOB: 1965   Date of Visit: 9/21/2018       Dear Dr. Oanh López    Thank you for referring Spike Estrella to me for evaluation. Attached you will find relevant portions of my assessment and plan of care.    If you have questions, please do not hesitate to call me. I look forward to following Spike Estrella along with you.    Sincerely,    Viola Culp MD    Enclosure    If you would like to receive this communication electronically, please contact [email or web address] or [phone] to request [employer] access.    [employer] is a tool which provides read-only access to select patient information with whom you have a relationship. Its easy to use and provides real time access to review your patients record including encounter summaries, notes, results, and demographic information.    If you feel you have received this communication in error or would no longer like to receive these types of communications, please e-mail [email or web address]

## 2018-09-21 NOTE — PROGRESS NOTES
LISTED PATIENT EDUCATION NOTE    Mr. Spike Estrella was seen in pre-kidney transplant clinic for evaluation for kidney, kidney/pancreas or pancreas only transplant.  The patient attended a group education session that discussed/reviewed the following aspects of transplantation: evaluation and selection committee process, UNOS waitlist management/multiple listings, types of organs offered (KDPI < 85%, KDPI > 85%, PHS increased risk, DCD), financial aspects, surgical procedures, dietary instruction pre- and post-transplant, health maintenance pre- and post-transplant, post-transplant hospitalization and outpatient follow-up, potential to participate in a research protocol, and medication management and side effects.  A question and answer session was provided after the presentation.    The patient was seen by all members of the multi-disciplinary team to include: Nephrologist/PA, Surgeon, , Transplant Coordinator, , Pharmacist and Dietician (if applicable).    The patient reviewed and signed all consents for evaluation which were witnessed and sent to scanning into the EPIC chart.    The patient was given an education book and plan for further evaluation based on his individual assessment.      The patient was encouraged to call with any questions or concerns.

## 2018-10-05 ENCOUNTER — LAB VISIT (OUTPATIENT)
Dept: LAB | Facility: HOSPITAL | Age: 53
End: 2018-10-05
Attending: INTERNAL MEDICINE
Payer: COMMERCIAL

## 2018-10-05 DIAGNOSIS — Z76.82 AWAITING ORGAN TRANSPLANT STATUS: ICD-10-CM

## 2018-10-05 PROCEDURE — 86832 HLA CLASS I HIGH DEFIN QUAL: CPT | Mod: PO,TXP

## 2018-10-05 PROCEDURE — 86833 HLA CLASS II HIGH DEFIN QUAL: CPT | Mod: PO,TXP

## 2018-10-08 NOTE — PROGRESS NOTES
Transplant Recipient Adult Psychosocial Assessment Update    Spike Estrella  [address] [facility] [place] [address]    Telephone Information:   Mobile [phone]   Home  [phone] (home)  Work  There is no work phone number on file.  E-mail  [email or web address]    Sex: male  YOB: 1965  Age: 53 y.o.    Encounter Date: 2018  [place] Citizen: yes  Primary Language: English   Needed: no    Emergency Contact:  Name: Sonja Estrella  Relationship: wife  Address: same as above  Phone Numbers:  [phone] (home), n/a (work), [phone] (mobile)    Family/Social Support:   Number of dependents/: 15 y/o sonAnibal  Marital history:  once to current wife of 25 yrs.  Other family dynamics: Parents are ; four brothers with whom he reports are very close.  One lives in [place], one in [place], [place], one in [place] and one lives temporarily in [facility].    Household Composition:  Name: Patient and Sonja Estrella   Age: both 54 y/o  Relationship: patient and wife  Does person drive? yes    Name: Anibal Estrella  Age: 15  Relationship: son  Does person drive? now has his permit    Do you and your caregivers have access to reliable transportation? yes  PRIMARY CAREGIVER: Sonja Estrella  will be primary caregiver, phone number [phone].      provided in-depth information to patient and caregiver regarding pre- and post-transplant caregiver role.   strongly encourages patient and caregiver to have concrete plan regarding post-transplant care giving, including back-up caregiver(s) to ensure care giving needs are met as needed.    Patient and Caregiver states understanding all aspects of caregiver role/commitment and is able/willing/committed to being caregiver to the fullest extent necessary.    Patient and Caregiver verbalizes understanding of the education provided today and caregiver responsibilities.         remains available.  Patient and Caregiver agree to contact  in a timely manner if concerns arise.      Able to take time off work without financial concerns: yes.     Additional Significant Others who will Assist with Transplant:  Name: Danny Estrella and his wife  Age: 55  City: [place] State: [place]  Relationship: brother and sister in law  Does person drive? yes      Living Will: no  Healthcare Power of : yes  Advance Directives on file: <<no information> per medical record.  Verbally reviewed LW/HCPA information.   provided patient with copy of LW/HCPA documents and provided education on completion of forms.    Living Donors: All of patient's brothers have expressed interest in donation    Highest Education Level: Attended College/Technical School  Reading Ability: college  Reports difficulty with: hearing (minor hearing loss from occupational exposure)  Learns Best By:  Hands on     Status: no  VA Benefits: no     Working for Income: yes  If yes, working activity level: Working Full Time    Patient is currently working at [employer] since 2016.  Formerly at [employer]..    Spouse/Significant Other Employment: Wife works full time in logistics for a contractor at [employer].      Disabled: no    Monthly Income:  Other: $not disclosed  Able to afford all costs now and if transplanted, including medications: yes  Patient and Caregiver verbalizes understanding of personal responsibilities related to transplant costs and the importance of having a financial plan to ensure that patients transplant costs are fully covered.       provided fundraising information/education. Patient and Caregiververbalizes understanding.   remains available.    Insurance:   Payor/Plan Subscr  Sex Relation Sub. Ins. ID Effective Group Num   1. BLUE CROSS BL* HILDAYAMILET 1965 Male  [id number]                                    [address] [address]       Primary Insurance (for UNOS reporting): Private Insurance  Secondary Insurance (for UNOS reporting): None  Patient and Caregiver verbalizes clear understanding that patient may experience difficulty obtaining and/or be denied insurance coverage post-surgery. This includes and is not limited to disability insurance, life insurance, health insurance, burial insurance, long term care insurance, and other insurances.      Patient and Caregiver also reports understanding that future health concerns related to or unrelated to transplantation may not be covered by patient's insurance.  Resources and information provided and reviewed.     Patient and Caregiver provides verbal permission to release any necessary information to outside resources for patient care and discharge planning.  Resources and information provided are reviewed.      Dialysis Adherence: Patient and Caregiver reports he is predialysis and is very compliant with MD recommendations.  Pt has hx of good nephrology compliance reports.     Infusion Service: patient utilizing? no  Home Health: patient utilizing? no  DME: no  Pulmonary/Cardiac Rehab: none   ADLS:  Pt states ability to independently accomplish all adls.    Adherence:   Pt states current and expected compliance with all healthcare recommendations..  Adherence education and counseling provided.     Per History Section:  Past Medical History:   Diagnosis Date    Anemia of renal disease     Essential hypertension     Hx of aortic valve replacement     Immunosuppressed status     Lupus nephritis     Metabolic acidosis     Secondary hyperparathyroidism of renal origin     Stage 4 chronic kidney disease     Stenosis and insufficiency of lacrimal passages     Systemic lupus erythematosus      Social History     Tobacco Use    Smoking status: Never Smoker    Smokeless tobacco: Former User     Types: Snuff, Chew   Substance Use Topics    Alcohol use: Yes      Alcohol/week: 0.6 oz     Types: 1 Cans of beer per week     Comment: occasionally      Social History     Substance and Sexual Activity   Drug Use No     Social History     Substance and Sexual Activity   Sexual Activity Not on file       Per Today's Psychosocial:  Tobacco: none, patient denies any use.  Pt reports he dipped and chewed but quit 20 yrs ago.  Alcohol: occasional.  Illicit Drugs/Non-prescribed Medications: none, patient denies any use.    Patient and Caregiver states clear understanding of the potential impact of substance use as it relates to transplant candidacy and is aware of possible random substance screening.  Substance abstinence/cessation counseling, education and resources provided and reviewed.     Arrests/DWI/Treatment/Rehab: patient denies    Psychiatric History:    Mental Health: pt denied any mental health concerns  Psychiatrist/Counselor: none  Medications:  none  Suicide/Homicide Issues: Pt denies current or past suicidal/homicidal ideation.   Safety at home: Pt denies any home safety concerns.    Knowledge: Patient and Caregiver states having clear understanding and realistic expectations regarding the potential risks and potential benefits of organ transplantation and organ donation and agrees to discuss with health care team members and support system members, as well as to utilize available resources and express questions and/or concerns in order to further facilitate the pt informed decision-making.  Resources and information provided and reviewed.    Patient and Caregiver is aware of [facility]'s affiliation and/or partnership with agencies in home health care, LTAC, SNF, [facility], and other hospitals and clinics.    Understanding: Patient and Caregiver reports having a clear understanding of the many lifetime commitments involved with being a transplant recipient, including costs, compliance, medications, lab work, procedures, appointments, concrete and financial planning, preparedness,  timely and appropriate communication of concerns, abstinence (ETOH, tobacco, illicit non-prescribed drugs), adherence to all health care team recommendations, support system and caregiver involvement, appropriate and timely resource utilization and follow-through, mental health counseling as needed/recommended, and patient and caregiver responsibilities.  Social Service Handbook, resources and detailed educational information provided and reviewed.  Educational information provided.    Patient and Caregiver also reports current and expected compliance with health care regime and states having a clear understanding of the importance of compliance.      Patient and Caregiver reports a clear understanding that risks and benefits may be involved with organ transplantation and with organ donation.       Patient and Caregiver also reports clear understanding that psychosocial risk factors may affect patient, and include but are not limited to feelings of depression, generalized anxiety, anxiety regarding dependence on others, post traumatic stress disorder, feelings of guilt and other emotional and/or mental concerns, and/or exacerbation of existing mental health concerns.  Detailed resources provided and discussed.      Patient and Caregiver agrees to access appropriate resources in a timely manner as needed and/or as recommended, and to communicate concerns appropriately.  Patient and Caregiver also reports a clear understanding of treatment options available.     Patient and Caregiver received education in a group setting.   reviewed education, provided additional information, and answered questions.    Feelings or Concerns: Pt stated his only concern is to be around to see his son grow up.      Coping: Pt reports he chema through attending mass at [employer], watching and participating in sports (soccer, football, golf, NASCAR) and spending time with family, especially son's activities.     Goals:  Avoid dialysis, see son grow up..  Patient referred to Vocational Rehabilitation.    Interview Behavior: Patient and Caregiver presents as alert and oriented x 4, pleasant, good eye contact, well groomed, recall good, concentration/judgement good, average intelligence, calm, communicative, cooperative and asking and answering questions appropriately. He was accompanied by his wife who appeared very supportive of pt's pursuit of transplant.         Transplant Social Work - Candidacy  Assessment/Plan:     Psychosocial Suitability: Patient presents as an acceptable candidate for kidney transplant at this time. Based on psychosocial risk factors, patient presents as low risk, due to absence of significant psychosocial risk factors..    Recommendations/Additional Comments: Recommended fundraising.  Pt and wife may benefit from [address] post transplant.    Oumou Vizcarra LCSW

## 2018-10-16 LAB — HPRA INTERPRETATION: NORMAL

## 2018-10-24 LAB
CLASS I ANTIBODIES - LUMINEX: NORMAL
CLASS I ANTIBODY COMMENTS - LUMINEX: NORMAL
CLASS II ANTIBODY COMMENTS - LUMINEX: NORMAL
CPRA %: 1
SERUM COLLECTION DT - LUMINEX CLASS I: NORMAL
SERUM COLLECTION DT - LUMINEX CLASS II: NORMAL
SPCL1 TESTING DATE: NORMAL
SPCL2 TESTING DATE: NORMAL
SPCLU TESTING DATE: NORMAL

## 2018-11-10 ENCOUNTER — LAB VISIT (OUTPATIENT)
Dept: LAB | Facility: HOSPITAL | Age: 53
End: 2018-11-10
Attending: INTERNAL MEDICINE
Payer: COMMERCIAL

## 2018-11-10 DIAGNOSIS — Z76.82 AWAITING ORGAN TRANSPLANT STATUS: ICD-10-CM

## 2018-11-10 PROCEDURE — 99001 SPECIMEN HANDLING PT-LAB: CPT | Mod: PO,TXP

## 2018-12-08 ENCOUNTER — LAB VISIT (OUTPATIENT)
Dept: LAB | Facility: HOSPITAL | Age: 53
End: 2018-12-08
Attending: INTERNAL MEDICINE
Payer: COMMERCIAL

## 2018-12-08 DIAGNOSIS — Z76.82 AWAITING ORGAN TRANSPLANT STATUS: ICD-10-CM

## 2018-12-08 PROCEDURE — 99001 SPECIMEN HANDLING PT-LAB: CPT | Mod: PO,TXP

## 2019-01-15 DIAGNOSIS — Z76.82 AWAITING ORGAN TRANSPLANT STATUS: Primary | ICD-10-CM

## 2019-02-09 ENCOUNTER — LAB VISIT (OUTPATIENT)
Dept: LAB | Facility: HOSPITAL | Age: 54
End: 2019-02-09
Attending: NURSE PRACTITIONER
Payer: COMMERCIAL

## 2019-02-09 DIAGNOSIS — Z76.82 AWAITING ORGAN TRANSPLANT STATUS: ICD-10-CM

## 2019-02-09 PROCEDURE — 86832 HLA CLASS I HIGH DEFIN QUAL: CPT | Mod: PO,TXP

## 2019-02-09 PROCEDURE — 86833 HLA CLASS II HIGH DEFIN QUAL: CPT | Mod: PO,TXP

## 2019-02-21 LAB — HPRA INTERPRETATION: NORMAL

## 2019-02-26 LAB
CLASS I ANTIBODY COMMENTS - LUMINEX: NORMAL
CLASS II ANTIBODY COMMENTS - LUMINEX: NORMAL
CPRA %: 0
SERUM COLLECTION DT - LUMINEX CLASS I: NORMAL
SERUM COLLECTION DT - LUMINEX CLASS II: NORMAL
SPCL1 TESTING DATE: NORMAL
SPCL2 TESTING DATE: NORMAL
SPCLU TESTING DATE: NORMAL

## 2019-03-02 ENCOUNTER — LAB VISIT (OUTPATIENT)
Dept: LAB | Facility: HOSPITAL | Age: 54
End: 2019-03-02
Attending: NURSE PRACTITIONER
Payer: COMMERCIAL

## 2019-03-02 DIAGNOSIS — Z76.82 AWAITING ORGAN TRANSPLANT STATUS: ICD-10-CM

## 2019-03-02 PROCEDURE — 99001 SPECIMEN HANDLING PT-LAB: CPT | Mod: PO,TXP

## 2019-04-06 ENCOUNTER — LAB VISIT (OUTPATIENT)
Dept: LAB | Facility: HOSPITAL | Age: 54
End: 2019-04-06
Attending: NURSE PRACTITIONER
Payer: COMMERCIAL

## 2019-04-06 DIAGNOSIS — Z76.82 AWAITING ORGAN TRANSPLANT STATUS: ICD-10-CM

## 2019-04-06 PROCEDURE — 99001 SPECIMEN HANDLING PT-LAB: CPT | Mod: PO,TXP

## 2019-05-04 ENCOUNTER — LAB VISIT (OUTPATIENT)
Dept: LAB | Facility: HOSPITAL | Age: 54
End: 2019-05-04
Attending: NURSE PRACTITIONER
Payer: COMMERCIAL

## 2019-05-04 DIAGNOSIS — Z76.82 AWAITING ORGAN TRANSPLANT STATUS: ICD-10-CM

## 2019-05-04 PROCEDURE — 86832 HLA CLASS I HIGH DEFIN QUAL: CPT | Mod: PO,TXP

## 2019-05-04 PROCEDURE — 86833 HLA CLASS II HIGH DEFIN QUAL: CPT | Mod: PO,TXP

## 2019-05-08 LAB — HPRA INTERPRETATION: NORMAL

## 2019-06-01 ENCOUNTER — LAB VISIT (OUTPATIENT)
Dept: LAB | Facility: HOSPITAL | Age: 54
End: 2019-06-01
Attending: INTERNAL MEDICINE
Payer: COMMERCIAL

## 2019-06-01 DIAGNOSIS — Z76.82 AWAITING ORGAN TRANSPLANT STATUS: ICD-10-CM

## 2019-06-01 PROCEDURE — 99001 SPECIMEN HANDLING PT-LAB: CPT | Mod: PO,TXP

## 2019-07-06 ENCOUNTER — LAB VISIT (OUTPATIENT)
Dept: LAB | Facility: HOSPITAL | Age: 54
End: 2019-07-06
Attending: NURSE PRACTITIONER
Payer: COMMERCIAL

## 2019-07-06 DIAGNOSIS — Z76.82 AWAITING ORGAN TRANSPLANT STATUS: ICD-10-CM

## 2019-07-06 PROCEDURE — 99001 SPECIMEN HANDLING PT-LAB: CPT | Mod: PO,TXP

## 2019-08-03 ENCOUNTER — LAB VISIT (OUTPATIENT)
Dept: LAB | Facility: HOSPITAL | Age: 54
End: 2019-08-03
Attending: OPTOMETRIST
Payer: COMMERCIAL

## 2019-08-03 DIAGNOSIS — Z76.82 ORGAN TRANSPLANT CANDIDATE: ICD-10-CM

## 2019-08-03 PROCEDURE — 86833 HLA CLASS II HIGH DEFIN QUAL: CPT | Mod: PO,TXP

## 2019-08-03 PROCEDURE — 86832 HLA CLASS I HIGH DEFIN QUAL: CPT | Mod: PO,TXP

## 2019-08-07 LAB — HPRA INTERPRETATION: NORMAL

## 2019-08-26 DIAGNOSIS — Z76.82 ORGAN TRANSPLANT CANDIDATE: Primary | ICD-10-CM

## 2019-08-26 NOTE — PROGRESS NOTES
YEARLY LIST MANAGEMENT NOTE    Spike Estrella's kidney transplant listing status reviewed.  Patient is due for follow-up appointments in October 2019.  Appointments will be scheduled per protocol.  HLA sample is current and being rec'd on a regular basis.

## 2019-09-07 ENCOUNTER — LAB VISIT (OUTPATIENT)
Dept: LAB | Facility: HOSPITAL | Age: 54
End: 2019-09-07
Attending: NURSE PRACTITIONER
Payer: COMMERCIAL

## 2019-09-07 DIAGNOSIS — Z76.82 AWAITING ORGAN TRANSPLANT STATUS: ICD-10-CM

## 2019-09-07 PROCEDURE — 99001 SPECIMEN HANDLING PT-LAB: CPT | Mod: PO,TXP

## 2019-09-13 ENCOUNTER — TELEPHONE (OUTPATIENT)
Dept: CARDIOLOGY | Facility: CLINIC | Age: 54
End: 2019-09-13

## 2019-09-17 ENCOUNTER — HOSPITAL ENCOUNTER (OUTPATIENT)
Dept: RADIOLOGY | Facility: HOSPITAL | Age: 54
Discharge: HOME | End: 2019-09-17
Attending: NURSE PRACTITIONER
Payer: COMMERCIAL

## 2019-09-17 ENCOUNTER — HOSPITAL ENCOUNTER (OUTPATIENT)
Dept: CARDIOLOGY | Facility: CLINIC | Age: 54
Discharge: HOME | End: 2019-09-17
Attending: NURSE PRACTITIONER
Payer: COMMERCIAL

## 2019-09-17 ENCOUNTER — OFFICE VISIT (OUTPATIENT)
Dept: TRANSPLANT | Facility: CLINIC | Age: 54
End: 2019-09-17
Payer: COMMERCIAL

## 2019-09-17 ENCOUNTER — CLINICAL SUPPORT (OUTPATIENT)
Dept: CARDIOLOGY | Facility: CLINIC | Age: 54
End: 2019-09-17
Attending: NURSE PRACTITIONER
Payer: COMMERCIAL

## 2019-09-17 VITALS — BODY MASS INDEX: 29.82 KG/M2 | HEIGHT: 73 IN | WEIGHT: 225 LBS

## 2019-09-17 VITALS
BODY MASS INDEX: 29.82 KG/M2 | HEIGHT: 73 IN | SYSTOLIC BLOOD PRESSURE: 126 MMHG | DIASTOLIC BLOOD PRESSURE: 80 MMHG | WEIGHT: 225 LBS | HEART RATE: 64 BPM

## 2019-09-17 VITALS
RESPIRATION RATE: 16 BRPM | HEART RATE: 73 BPM | BODY MASS INDEX: 29.63 KG/M2 | WEIGHT: 223.56 LBS | TEMPERATURE: 98 F | SYSTOLIC BLOOD PRESSURE: 126 MMHG | DIASTOLIC BLOOD PRESSURE: 80 MMHG | OXYGEN SATURATION: 97 % | HEIGHT: 73 IN

## 2019-09-17 DIAGNOSIS — Z76.82 ORGAN TRANSPLANT CANDIDATE: ICD-10-CM

## 2019-09-17 DIAGNOSIS — D63.1 ANEMIA OF RENAL DISEASE: Chronic | ICD-10-CM

## 2019-09-17 DIAGNOSIS — N18.9 ANEMIA OF RENAL DISEASE: Chronic | ICD-10-CM

## 2019-09-17 DIAGNOSIS — M32.14 LUPUS NEPHRITIS: ICD-10-CM

## 2019-09-17 DIAGNOSIS — N18.4 STAGE 4 CHRONIC KIDNEY DISEASE: Chronic | ICD-10-CM

## 2019-09-17 DIAGNOSIS — Z76.82 PATIENT ON WAITING LIST FOR KIDNEY TRANSPLANT: Primary | Chronic | ICD-10-CM

## 2019-09-17 DIAGNOSIS — N25.81 SECONDARY HYPERPARATHYROIDISM OF RENAL ORIGIN: Chronic | ICD-10-CM

## 2019-09-17 DIAGNOSIS — I10 ESSENTIAL HYPERTENSION: Chronic | ICD-10-CM

## 2019-09-17 LAB
ASCENDING AORTA: 3.68 CM
AV INDEX (PROSTH): 0.41
AV MEAN GRADIENT: 44 MMHG
AV PEAK GRADIENT: 75 MMHG
AV VALVE AREA: 1.48 CM2
AV VELOCITY RATIO: 0.36
BSA FOR ECHO PROCEDURE: 2.29 M2
CV ECHO LV RWT: 0.47 CM
CV PHARM DOSE: 0.4 MG
CV STRESS BASE HR: 67 BPM
DIASTOLIC BLOOD PRESSURE: 86 MMHG
DOP CALC AO PEAK VEL: 4.33 M/S
DOP CALC AO VTI: 82 CM
DOP CALC LVOT AREA: 3.6 CM2
DOP CALC LVOT DIAMETER: 2.15 CM
DOP CALC LVOT PEAK VEL: 1.54 M/S
DOP CALC LVOT STROKE VOLUME: 121.49 CM3
DOP CALCLVOT PEAK VEL VTI: 33.48 CM
E WAVE DECELERATION TIME: 266.07 MSEC
E/A RATIO: 1.41
E/E' RATIO: 22.13 M/S
ECHO LV POSTERIOR WALL: 1.13 CM (ref 0.6–1.1)
END DIASTOLIC INDEX-HIGH: 170 ML/M2
END SYSTOLIC INDEX-HIGH: 70 ML/M2
FRACTIONAL SHORTENING: 37 % (ref 28–44)
INTERVENTRICULAR SEPTUM: 0.95 CM (ref 0.6–1.1)
IVRT: 0.05 MSEC
LA MAJOR: 6.02 CM
LA MINOR: 6.08 CM
LA WIDTH: 4.22 CM
LEFT ATRIUM SIZE: 4.88 CM
LEFT ATRIUM VOLUME INDEX: 46.8 ML/M2
LEFT ATRIUM VOLUME: 105.9 CM3
LEFT INTERNAL DIMENSION IN SYSTOLE: 3.05 CM (ref 2.1–4)
LEFT VENTRICLE DIASTOLIC VOLUME INDEX: 47.89 ML/M2
LEFT VENTRICLE DIASTOLIC VOLUME: 108.33 ML
LEFT VENTRICLE MASS INDEX: 80 G/M2
LEFT VENTRICLE SYSTOLIC VOLUME INDEX: 16.1 ML/M2
LEFT VENTRICLE SYSTOLIC VOLUME: 36.37 ML
LEFT VENTRICULAR INTERNAL DIMENSION IN DIASTOLE: 4.82 CM (ref 3.5–6)
LEFT VENTRICULAR MASS: 180.75 G
LV LATERAL E/E' RATIO: 20.75 M/S
LV SEPTAL E/E' RATIO: 23.71 M/S
MV PEAK A VEL: 1.18 M/S
MV PEAK E VEL: 1.66 M/S
NUC REST DIASTOLIC VOLUME INDEX: 197
NUC REST EJECTION FRACTION: 74
NUC REST SYSTOLIC VOLUME INDEX: 51
OHS CV CPX 85 PERCENT MAX PREDICTED HEART RATE MALE: 139
OHS CV CPX MAX PREDICTED HEART RATE: 164
OHS CV CPX PATIENT IS FEMALE: 0
OHS CV CPX PATIENT IS MALE: 1
OHS CV CPX PEAK HEAR RATE: 71 BPM
OHS CV CPX PERCENT MAX PREDICTED HEART RATE ACHIEVED: 43
OHS CV CPX RATE PRESSURE PRODUCT PRESENTING: NORMAL
PISA TR MAX VEL: 3.2 M/S
PULM VEIN S/D RATIO: 0.58
PV PEAK D VEL: 0.78 M/S
PV PEAK S VEL: 0.45 M/S
RA MAJOR: 5.47 CM
RA PRESSURE: 3 MMHG
RA WIDTH: 5.46 CM
RETIRED EF AND QEF - SEE NOTES: 51 %
RIGHT VENTRICULAR END-DIASTOLIC DIMENSION: 4.2 CM
RV TISSUE DOPPLER FREE WALL SYSTOLIC VELOCITY 1 (APICAL 4 CHAMBER VIEW): 10.81 CM/S
SINUS: 3.12 CM
STJ: 3.63 CM
STRESS ECHO TARGET HR: 139.4 BPM
SYSTOLIC BLOOD PRESSURE: 154 MMHG
TDI LATERAL: 0.08 M/S
TDI SEPTAL: 0.07 M/S
TDI: 0.08 M/S
TR MAX PG: 41 MMHG
TRICUSPID ANNULAR PLANE SYSTOLIC EXCURSION: 1.86 CM
TV REST PULMONARY ARTERY PRESSURE: 44 MMHG

## 2019-09-17 PROCEDURE — 72170 X-RAY EXAM OF PELVIS: CPT | Mod: 26,TXP,, | Performed by: RADIOLOGY

## 2019-09-17 PROCEDURE — 76770 US EXAM ABDO BACK WALL COMP: CPT | Mod: 26,59,TXP, | Performed by: RADIOLOGY

## 2019-09-17 PROCEDURE — 99999 PR PBB SHADOW E&M-EST. PATIENT-LVL I: ICD-10-PCS | Mod: PBBFAC,TXP,,

## 2019-09-17 PROCEDURE — A9502 TC99M TETROFOSMIN: HCPCS | Mod: S$GLB,TXP,, | Performed by: INTERNAL MEDICINE

## 2019-09-17 PROCEDURE — 3008F PR BODY MASS INDEX (BMI) DOCUMENTED: ICD-10-PCS | Mod: CPTII,S$GLB,TXP, | Performed by: NURSE PRACTITIONER

## 2019-09-17 PROCEDURE — 3074F SYST BP LT 130 MM HG: CPT | Mod: CPTII,S$GLB,TXP, | Performed by: NURSE PRACTITIONER

## 2019-09-17 PROCEDURE — 3008F BODY MASS INDEX DOCD: CPT | Mod: CPTII,S$GLB,TXP, | Performed by: NURSE PRACTITIONER

## 2019-09-17 PROCEDURE — 93978 VASCULAR STUDY: CPT | Mod: 26,TXP,, | Performed by: RADIOLOGY

## 2019-09-17 PROCEDURE — 78452 STRESS TEST WITH MYOCARDIAL PERFUSION (CUPID ONLY): ICD-10-PCS | Mod: S$GLB,TXP,, | Performed by: INTERNAL MEDICINE

## 2019-09-17 PROCEDURE — 99215 OFFICE O/P EST HI 40 MIN: CPT | Mod: S$GLB,TXP,, | Performed by: NURSE PRACTITIONER

## 2019-09-17 PROCEDURE — 71046 XR CHEST PA AND LATERAL: ICD-10-PCS | Mod: 26,TXP,, | Performed by: RADIOLOGY

## 2019-09-17 PROCEDURE — 99999 PR PBB SHADOW E&M-EST. PATIENT-LVL I: CPT | Mod: PBBFAC,TXP,,

## 2019-09-17 PROCEDURE — 93306 TRANSTHORACIC ECHO (TTE) COMPLETE (CUPID ONLY): ICD-10-PCS | Mod: S$GLB,TXP,, | Performed by: INTERNAL MEDICINE

## 2019-09-17 PROCEDURE — 78452 HT MUSCLE IMAGE SPECT MULT: CPT | Mod: S$GLB,TXP,, | Performed by: INTERNAL MEDICINE

## 2019-09-17 PROCEDURE — 3079F PR MOST RECENT DIASTOLIC BLOOD PRESSURE 80-89 MM HG: ICD-10-PCS | Mod: CPTII,S$GLB,TXP, | Performed by: NURSE PRACTITIONER

## 2019-09-17 PROCEDURE — 71046 X-RAY EXAM CHEST 2 VIEWS: CPT | Mod: TC,TXP

## 2019-09-17 PROCEDURE — 93015 CV STRESS TEST SUPVJ I&R: CPT | Mod: S$GLB,TXP,, | Performed by: INTERNAL MEDICINE

## 2019-09-17 PROCEDURE — 99999 PR PBB SHADOW E&M-EST. PATIENT-LVL IV: ICD-10-PCS | Mod: PBBFAC,TXP,, | Performed by: NURSE PRACTITIONER

## 2019-09-17 PROCEDURE — 3074F PR MOST RECENT SYSTOLIC BLOOD PRESSURE < 130 MM HG: ICD-10-PCS | Mod: CPTII,S$GLB,TXP, | Performed by: NURSE PRACTITIONER

## 2019-09-17 PROCEDURE — 99999 PR PBB SHADOW E&M-EST. PATIENT-LVL IV: CPT | Mod: PBBFAC,TXP,, | Performed by: NURSE PRACTITIONER

## 2019-09-17 PROCEDURE — 93306 TTE W/DOPPLER COMPLETE: CPT | Mod: S$GLB,TXP,, | Performed by: INTERNAL MEDICINE

## 2019-09-17 PROCEDURE — 93978 VASCULAR STUDY: CPT | Mod: TC,TXP

## 2019-09-17 PROCEDURE — 72170 XR PELVIS ROUTINE AP: ICD-10-PCS | Mod: 26,TXP,, | Performed by: RADIOLOGY

## 2019-09-17 PROCEDURE — 93978 US DOPP ILIACS BILATERAL: ICD-10-PCS | Mod: 26,TXP,, | Performed by: RADIOLOGY

## 2019-09-17 PROCEDURE — 76770 US EXAM ABDO BACK WALL COMP: CPT | Mod: TC,TXP

## 2019-09-17 PROCEDURE — 3079F DIAST BP 80-89 MM HG: CPT | Mod: CPTII,S$GLB,TXP, | Performed by: NURSE PRACTITIONER

## 2019-09-17 PROCEDURE — 71046 X-RAY EXAM CHEST 2 VIEWS: CPT | Mod: 26,TXP,, | Performed by: RADIOLOGY

## 2019-09-17 PROCEDURE — 99215 PR OFFICE/OUTPT VISIT, EST, LEVL V, 40-54 MIN: ICD-10-PCS | Mod: S$GLB,TXP,, | Performed by: NURSE PRACTITIONER

## 2019-09-17 PROCEDURE — 76770 US RETROPERITONEAL COMPLETE: ICD-10-PCS | Mod: 26,59,TXP, | Performed by: RADIOLOGY

## 2019-09-17 PROCEDURE — 72170 X-RAY EXAM OF PELVIS: CPT | Mod: TC,TXP

## 2019-09-17 PROCEDURE — 93015 STRESS TEST WITH MYOCARDIAL PERFUSION (CUPID ONLY): ICD-10-PCS | Mod: S$GLB,TXP,, | Performed by: INTERNAL MEDICINE

## 2019-09-17 PROCEDURE — A9502 STRESS TEST WITH MYOCARDIAL PERFUSION (CUPID ONLY): ICD-10-PCS | Mod: S$GLB,TXP,, | Performed by: INTERNAL MEDICINE

## 2019-09-17 RX ORDER — REGADENOSON 0.08 MG/ML
0.4 INJECTION, SOLUTION INTRAVENOUS
Status: COMPLETED | OUTPATIENT
Start: 2019-09-17 | End: 2019-09-17

## 2019-09-17 RX ORDER — ASPIRIN 81 MG/1
81 TABLET ORAL DAILY
COMMUNITY
End: 2024-01-17

## 2019-09-17 RX ORDER — HYDRALAZINE HYDROCHLORIDE 25 MG/1
0.5 TABLET, FILM COATED ORAL 2 TIMES DAILY
Refills: 2 | COMMUNITY
Start: 2019-09-03 | End: 2021-11-05

## 2019-09-17 RX ORDER — NIFEDIPINE 30 MG/1
1 TABLET, EXTENDED RELEASE ORAL DAILY
Refills: 1 | COMMUNITY
Start: 2019-09-03 | End: 2021-11-05

## 2019-09-17 RX ADMIN — REGADENOSON 0.4 MG: 0.08 INJECTION, SOLUTION INTRAVENOUS at 09:09

## 2019-09-17 NOTE — PROGRESS NOTES
Transplant Recipient Adult Psychosocial Assessment Update    Spike Estrella  [address] [facility] [place] [address]  Telephone Information:   Mobile [phone]   Home  [phone] (home)  Work  There is no work phone number on file.  E-mail  [email or web address]    Sex: male  YOB: 1965  Age: 54 y.o.    Encounter Date: 2019  [place] Citizen: yes  Primary Language: English   Needed: no    Emergency Contact:  Name: Sonja Estrella  Relationship: wife  Address: same as above  Phone Numbers:  [phone] (home), n/a (work), [phone] (mobile)    Family/Social Support:   Number of dependents/: 13 y/o sonAnibal  Marital history:  once to current wife of 26 yrs.  Other family dynamics: Parents are ; four brothers with whom he reports are very close.  One lives in [place], one in [place], [place], one in [place] and one lives temporarily in [facility].    Household Composition:  Name: Patient and Sonja Estrella   Age: both 53 y/o  Relationship: patient and wife  Does person drive? yes    Name: Anibal Estrella  Age: 16  Relationship: son  Does person drive? now has his permit    Do you and your caregivers have access to reliable transportation? yes  PRIMARY CAREGIVER: Sonja Estrella  will be primary caregiver, phone number [phone].      provided in-depth information to patient and caregiver regarding pre- and post-transplant caregiver role.   strongly encourages patient and caregiver to have concrete plan regarding post-transplant care giving, including back-up caregiver(s) to ensure care giving needs are met as needed.    Patient and Caregiver states understanding all aspects of caregiver role/commitment and is able/willing/committed to being caregiver to the fullest extent necessary.    Patient and Caregiver verbalizes understanding of the education provided today and caregiver responsibilities.         remains available. Patient  and Caregiver agree to contact  in a timely manner if concerns arise.      Able to take time off work without financial concerns: yes.     Additional Significant Others who will Assist with Transplant:  Name: Danny Stevens and his wife  Age: 55  City: [place] State: [place]  Relationship: brother and sister in law  Does person drive? yes      Living Will: no  Healthcare Power of : yes  Advance Directives on file: <<no information> per medical record.  Verbally reviewed LW/HCPA information.   provided patient with copy of LW/HCPA documents and provided education on completion of forms.    Living Donors: All of patient's brothers have expressed interest in donation    Highest Education Level: Attended College/Technical School  Reading Ability: college  Reports difficulty with: hearing (minor hearing loss from occupational exposure)  Learns Best By:  Hands on     Status: no  VA Benefits: no     Working for Income: yes  If yes, working activity level: Working Full Time    Patient is currently working at [employer] since 2016.  Formerly at [employer]..    Spouse/Significant Other Employment: Wife works full time in logistics for a contractor at [employer].      Disabled: no    Monthly Income:  Other: $combined 120k a year  Able to afford all costs now and if transplanted, including medications: yes  Patient and Caregiver verbalizes understanding of personal responsibilities related to transplant costs and the importance of having a financial plan to ensure that patients transplant costs are fully covered.       provided fundraising information/education. Patient and Caregiververbalizes understanding.   remains available.    Insurance:   Payor/Plan Subscr  Sex Relation Sub. Ins. ID Effective Group Num   1. BLUE CROSS BL* STEVENSYAMILET 1965 Male  [id number]                                    [address] [address]       Primary Insurance (for UNOS reporting): Private Insurance paid for by wife  Secondary Insurance (for UNOS reporting): None  Patient and Caregiver verbalizes clear understanding that patient may experience difficulty obtaining and/or be denied insurance coverage post-surgery. This includes and is not limited to disability insurance, life insurance, health insurance, burial insurance, long term care insurance, and other insurances.      Patient and Caregiver also reports understanding that future health concerns related to or unrelated to transplantation may not be covered by patient's insurance.  Resources and information provided and reviewed.     Patient and Caregiver provides verbal permission to release any necessary information to outside resources for patient care and discharge planning.  Resources and information provided are reviewed.      Dialysis Adherence: Patient and Caregiver reports he is predialysis and is very compliant with MD recommendations.  Pt has hx of good nephrology compliance reports.     Infusion Service: patient utilizing? no  Home Health: patient utilizing? no  DME: no  Pulmonary/Cardiac Rehab: none   ADLS:  Pt states ability to independently accomplish all adls.    Adherence:   Pt states current and expected compliance with all healthcare recommendations..  Adherence education and counseling provided.     Per History Section:  Past Medical History:   Diagnosis Date    Anemia of renal disease     Essential hypertension     Hx of aortic valve replacement     Immunosuppressed status     Lupus nephritis     Metabolic acidosis     Secondary hyperparathyroidism of renal origin     Stage 4 chronic kidney disease     Stenosis and insufficiency of lacrimal passages     Systemic lupus erythematosus      Social History     Tobacco Use    Smoking status: Never Smoker    Smokeless tobacco: Former User     Types: Snuff, Chew   Substance Use Topics    Alcohol use: Yes      Alcohol/week: 0.6 oz     Types: 1 Cans of beer per week     Comment: occasionally      Social History     Substance and Sexual Activity   Drug Use No     Social History     Substance and Sexual Activity   Sexual Activity Not on file       Per Today's Psychosocial:  Tobacco: none, patient denies any use.  Pt reports he dipped and chewed but quit 20 yrs ago.  Alcohol: occasional.  Illicit Drugs/Non-prescribed Medications: none, patient denies any use.    Patient and Caregiver states clear understanding of the potential impact of substance use as it relates to transplant candidacy and is aware of possible random substance screening.  Substance abstinence/cessation counseling, education and resources provided and reviewed.     Arrests/DWI/Treatment/Rehab: patient denies    Psychiatric History:    Mental Health: pt denied any mental health concerns  Psychiatrist/Counselor: none  Medications:  none  Suicide/Homicide Issues: Pt denies current or past suicidal/homicidal ideation.   Safety at home: Pt denies any home safety concerns.    Knowledge: Patient and Caregiver states having clear understanding and realistic expectations regarding the potential risks and potential benefits of organ transplantation and organ donation and agrees to discuss with health care team members and support system members, as well as to utilize available resources and express questions and/or concerns in order to further facilitate the pt informed decision-making.  Resources and information provided and reviewed.    Patient and Caregiver is aware of [facility]'s affiliation and/or partnership with agencies in home health care, LTAC, SNF, [facility], and other hospitals and clinics.    Understanding: Patient and Caregiver reports having a clear understanding of the many lifetime commitments involved with being a transplant recipient, including costs, compliance, medications, lab work, procedures, appointments, concrete and financial planning,  preparedness, timely and appropriate communication of concerns, abstinence (ETOH, tobacco, illicit non-prescribed drugs), adherence to all health care team recommendations, support system and caregiver involvement, appropriate and timely resource utilization and follow-through, mental health counseling as needed/recommended, and patient and caregiver responsibilities.  Social Service Handbook, resources and detailed educational information provided and reviewed.  Educational information provided.    Patient and Caregiver also reports current and expected compliance with health care regime and states having a clear understanding of the importance of compliance.      Patient and Caregiver reports a clear understanding that risks and benefits may be involved with organ transplantation and with organ donation.       Patient and Caregiver also reports clear understanding that psychosocial risk factors may affect patient, and include but are not limited to feelings of depression, generalized anxiety, anxiety regarding dependence on others, post traumatic stress disorder, feelings of guilt and other emotional and/or mental concerns, and/or exacerbation of existing mental health concerns.  Detailed resources provided and discussed.      Patient and Caregiver agrees to access appropriate resources in a timely manner as needed and/or as recommended, and to communicate concerns appropriately.  Patient and Caregiver also reports a clear understanding of treatment options available.     Patient and Caregiver received education in a group setting.   reviewed education, provided additional information, and answered questions.    Feelings or Concerns: Pt stated his only concern is to be around to see his son grow up.      Coping: Pt reports he chema through attending mass at [employer], watching and participating in sports (soccer, football, golf, NASCAR) and spending time with family, especially son's activities.      Goals: Avoid dialysis, see son grow up..  Patient referred to Vocational Rehabilitation.    Interview Behavior: Patient and Caregiver presents as alert and oriented x 4, pleasant, good eye contact, well groomed, recall good, concentration/judgement good, average intelligence, calm, communicative, cooperative and asking and answering questions appropriately. He was accompanied by his wife who appeared very supportive of pt's pursuit of transplant.         Transplant Social Work - Candidacy  Assessment/Plan:     Psychosocial Suitability: Patient presents as an acceptable candidate for kidney transplant at this time. Based on psychosocial risk factors, patient presents as low risk, due to absence of significant psychosocial risk factors..    Recommendations/Additional Comments: DENAE recommends that pt conduct fundraising to assist pt with pay for cost of medication, food,gas, and other transplant related expenses. DENAE recommends that pt remain free of all tobacco,ETOH, and substance use. SW remains available to assist with any concerns that may arise as pt navigates through the transplant process.        Amena Foster LMSW

## 2019-09-17 NOTE — PROGRESS NOTES
Kidney Transplant Recipient Reevalulation    Referring Physician: Oanh López  Current Nephrologist: Oanh López  Waitlist Status: active  Dialysis Start Date: (Not currently on dialysis)    Subjective:     CC:  Annual reassessment of kidney transplant candidacy.    HPI:  Mr. Estrella is a 54 y.o. year old White male with advanced kidney disease secondary to lupus nephritis.  He has been on the wait list for a kidney transplant at [facility] since 10/13/2017. Patient is currently pre-dialysis. He has a LUE AV fistula.  Recent hospitalizations or ED visits.  7/2019 Hospital for infection L AV fistula and [place]volemia. [facility].. Lasix dose increased and he reports Doing much better.       F/U with rheumatology. Remains On MMF for the past 4 + years , tolerating well.  Continues to work during the week. Over all looks good. Finds he gets fatigued quicker and feels its due to fluid.     PSA, SCREEN (ng/mL)   Date Value   09/17/2019 0.62     (Care every where)       Review of Systems   Constitutional: Negative for activity change, appetite change, chills, fatigue, fever and unexpected weight change.   HENT: Negative for congestion, facial swelling, postnasal drip, rhinorrhea, sinus pressure, sore throat and trouble swallowing.    Eyes: Negative for pain, redness and visual disturbance.   Respiratory: Negative for cough, chest tightness, shortness of breath and wheezing.    Cardiovascular: Positive for leg swelling. Negative for chest pain and palpitations.   Gastrointestinal: Negative for abdominal pain, diarrhea, nausea and vomiting.   Genitourinary: Negative for dysuria, flank pain and urgency.   Musculoskeletal: Negative for gait problem, neck pain and neck stiffness.   Skin: Negative for rash.   Allergic/Immunologic: Positive for immunocompromised state. Negative for environmental allergies and food allergies.        MMF   Neurological: Negative for dizziness, weakness, light-headedness and  "headaches.   Psychiatric/Behavioral: Negative for agitation and confusion. The patient is not nervous/anxious.        Objective:   body mass index is 29.34 kg/m².  /80 (BP Location: Right arm, Patient Position: Sitting, BP Method: Medium (Automatic))   Pulse 73   Temp 98.1 °F (36.7 °C) (Oral)   Resp 16   Ht 6' 1.19" (1.859 m)   Wt 101.4 kg (223 lb 8.7 oz)   SpO2 97%   BMI 29.34 kg/m²     Physical Exam   Constitutional: He is oriented to person, place, and time. He appears well-developed and well-nourished.   HENT:   Head: Normocephalic.   Mouth/Throat: Oropharynx is clear and moist. No oropharyngeal exudate.   Eyes: Pupils are equal, round, and reactive to light. Conjunctivae and EOM are normal. No scleral icterus.   Neck: Normal range of motion. Neck supple.   Cardiovascular: Normal rate and regular rhythm.   Murmur heard.   Systolic murmur is present with a grade of 1/6.  Pulmonary/Chest: Effort normal and breath sounds normal.   Abdominal: Soft. Normal appearance and bowel sounds are normal. He exhibits no distension and no mass. There is no splenomegaly or hepatomegaly. There is no tenderness. There is no rebound, no guarding, no CVA tenderness, no tenderness at McBurney's point and negative Green's sign.   Musculoskeletal: Normal range of motion. He exhibits edema.        Arms:  Bilateral peripheral edema +1   Lymphadenopathy:     He has no cervical adenopathy.   Neurological: He is alert and oriented to person, place, and time. He exhibits normal muscle tone. Coordination normal.   Skin: Skin is warm and dry.   Psychiatric: He has a normal mood and affect. His behavior is normal.   Vitals reviewed.      Labs:  Lab Results   Component Value Date    WBC 9.96 06/27/2017    HGB 13.3 (L) 06/27/2017    HCT 42.3 06/27/2017     06/27/2017    K 4.8 06/27/2017     06/27/2017    CO2 20 (L) 06/27/2017    BUN 62 (H) 06/27/2017    CREATININE 3.8 (H) 06/27/2017    EGFRNONAA 17.0 (A) 06/27/2017    " CALCIUM 10.0 06/27/2017    PHOS 4.7 (H) 06/27/2017    MG 2.2 06/19/2014    ALBUMIN 4.0 06/27/2017    AST 33 06/27/2017    ALT 21 06/27/2017    UTPCR 0.72 (H) 06/27/2017    .0 (H) 09/17/2019       Lab Results   Component Value Date    BILIRUBINUA Negative 06/27/2017    PROTEINUA 1+ (A) 06/27/2017    NITRITE neg 07/24/2017    RBCUA 0 07/24/2017    WBCUA 0 06/27/2017       No results found for: HLAABCTYPE    Lab Results   Component Value Date    CPRA 0 08/03/2019    MR2GNMW B63 10/05/2018    CIABCLM WEAK Cw6, B63 08/03/2019    CIIAB Negative 07/07/2018    ABCMT DP1 08/03/2019       Labs were reviewed with the patient.    Pre-transplant Workup:   Reviewed with the patient.    Assessment:     1. Lupus nephritis    2. Patient on waiting list for kidney transplant    3. Stage 4 chronic kidney disease    4. Essential hypertension    5. Anemia of renal disease    6. Secondary hyperparathyroidism of renal origin        Plan:   CXR, PXR, iliac doppler studies, NM stress test / Ivy   results pending   Over due for repeat colonoscopy- was due in 2/2019      Transplant Candidacy:   Mr. Estrella is a suitable kidney transplant candidate.  Meets center eligibility for accepting HCV+ donor offer - yes.  Patient educated on HCV+ donors. Spike is willing  to accept HCV+ donor offer -  no Pt would like to think about this.    Patient is a candidate for KDPI > 85 kidney donor offer - no d/t weight.  He remains in overall stable health, and will remain active on the transplant list.    Jesenia White NP       Follow-up:   In addition to the tests noted in the plan, Mr. Estrella will continue to have reevaluation as per the standing pre-kidney transplant protocol:  1. Monthly blood for PRA  2. Annual return to clinic, except HIV positive, > 65 years of age, or pancreas transplant candidates who will be scheduled to see transplant every 6 months while in pre-transplant phase  3. Annual re-testing: CXR, EKG, yearly mammograms for  women over 40 and PSA for males over 40, cardiology follow-up as recommended by initial cardiology pre-transplant evaluation  4. Renal ultrasound every 2 years  5. Baseline colonoscopy after age 50 and repeated as recommended    UNOS Patient Status  Functional Status: 80% - Normal activity with effort: some symptoms of disease  Physical Capacity: No Limitations

## 2019-09-17 NOTE — PROGRESS NOTES
PHARM.D. PRE-TRANSPLANT NOTE:    This patient's medication therapy was evaluated as part of his pre-transplant evaluation.      The following general pharmacologic concerns were noted: Aspirin will increase post op bleeding; patient is on Mycophenolate and has received Cytoxan therapy previously.    The following pharmacologic concerns related to HCV therapy were noted: N/A      This patient's medication profile was reviewed for contraindications for DAA Hepatitis C therapy:    [x]  No current inducers of CYP 3A4 or PGP  [x]  No amiodarone on this patient's EMR profile in the last 24 months  [x]  No past or current atrial fibrillation on this patient's EMR profile       Current Outpatient Medications   Medication Sig Dispense Refill    aspirin (ECOTRIN) 325 MG EC tablet Take 1 tablet (325 mg total) by mouth once daily. 30 tablet 3    furosemide (LASIX) 20 MG tablet Take 20 mg by mouth 2 (two) times daily.      hydroxychloroquine (PLAQUENIL) 200 mg tablet Take 200 mg by mouth once daily.   11    losartan (COZAAR) 50 MG tablet Take 50 mg by mouth once daily.      metoprolol succinate (TOPROL-XL) 50 MG 24 hr tablet Take 50 mg by mouth once daily.      multivitamin (THERAGRAN) per tablet Take 1 tablet by mouth once daily.      mycophenolate (CELLCEPT) 500 mg Tab 1,000 mg 2 (two) times daily.   3     No current facility-administered medications for this visit.          Currently Mr. Estrella is responsible for preparing / administering this patient's medications on a daily basis.  I am available for consultation and can be contacted, as needed by the other members of the Kidney Transplant team.

## 2019-09-18 NOTE — PROGRESS NOTES
INITIAL PATIENT EDUCATION NOTE    Mr. Spike Estrella was seen in pre-kidney transplant clinic for evaluation for kidney, kidney/pancreas or pancreas only transplant.  The patient attended a group education session that discussed/reviewed the following aspects of transplantation: evaluation and selection committee process, UNOS waitlist management/multiple listings, types of organs offered (KDPI < 85%, KDPI > 85%, PHS increased risk, DCD, HCV+), financial aspects, surgical procedures, dietary instruction pre- and post-transplant, health maintenance pre- and post-transplant, post-transplant hospitalization and outpatient follow-up, potential to participate in a research protocol, and medication management and side effects.  A question and answer session was provided after the presentation.    The patient was seen by all members of the multi-disciplinary team to include: Nephrologist/PA, , Transplant Coordinator, , Pharmacist and Dietician (if applicable).    The patient reviewed and signed all consents for evaluation which were witnessed and sent to scanning into the EPIC chart.    The patient was given an education book and plan for further evaluation based on his individual assessment.      The patient was encouraged to call with any questions or concerns.

## 2019-09-18 NOTE — PROGRESS NOTES
Spoke to patient who states he had recent testing with Dr. Miller in [place]. Forwarded echo and stress test to Dr. Miller and requested recent records and clearance for transplant (pending clarification of presence of endocarditis). Informed patient that he was placed inactive on the list until cleared. Pt voiced understanding.

## 2019-09-19 ENCOUNTER — TELEPHONE (OUTPATIENT)
Dept: TRANSPLANT | Facility: CLINIC | Age: 54
End: 2019-09-19

## 2019-09-19 NOTE — TELEPHONE ENCOUNTER
----- Message from Yakelin Pickens RN sent at 9/19/2019 11:30 AM CDT -----  Contact: Spike      ----- Message -----  From: Lily Hair  Sent: 9/19/2019  11:25 AM  To: Christopher Ba Staff    Pt stated he needed to speak with someone in the office about his labs. Pt contact number is [phone].

## 2019-09-19 NOTE — TELEPHONE ENCOUNTER
Spoke to patient, who was inquiring about labs we devin yesterday. Informed him that it was HLA, PTH, and PSA. Pt voiced understanding. States he is seeing his Nephrologist next week and was wondering if it was the same labs so he didn't have to repeat them.

## 2019-10-05 ENCOUNTER — LAB VISIT (OUTPATIENT)
Dept: LAB | Facility: HOSPITAL | Age: 54
End: 2019-10-05
Attending: NURSE PRACTITIONER
Payer: COMMERCIAL

## 2019-10-05 DIAGNOSIS — Z76.82 AWAITING ORGAN TRANSPLANT STATUS: ICD-10-CM

## 2019-10-05 PROCEDURE — 86833 HLA CLASS II HIGH DEFIN QUAL: CPT | Mod: PO,TXP

## 2019-10-05 PROCEDURE — 86832 HLA CLASS I HIGH DEFIN QUAL: CPT | Mod: PO,TXP

## 2019-10-08 NOTE — PROGRESS NOTES
Case reviewed with .  Stephanie to reactivate pt on kidney transplant waiting list.  ZAIRE reviewed and is acceptable to proceed to transplant.

## 2019-10-16 LAB — HPRA INTERPRETATION: NORMAL

## 2019-11-06 ENCOUNTER — LAB VISIT (OUTPATIENT)
Dept: LAB | Facility: HOSPITAL | Age: 54
End: 2019-11-06
Payer: COMMERCIAL

## 2019-11-06 DIAGNOSIS — Z76.82 ORGAN TRANSPLANT CANDIDATE: ICD-10-CM

## 2019-11-06 PROCEDURE — 99001 SPECIMEN HANDLING PT-LAB: CPT | Mod: PO,TXP

## 2019-11-18 PROCEDURE — 99001 SPECIMEN HANDLING PT-LAB: CPT | Mod: PO,TXP

## 2019-11-21 ENCOUNTER — LAB VISIT (OUTPATIENT)
Dept: LAB | Facility: HOSPITAL | Age: 54
End: 2019-11-21
Payer: COMMERCIAL

## 2019-11-21 DIAGNOSIS — Z76.82 AWAITING ORGAN TRANSPLANT STATUS: ICD-10-CM

## 2019-12-06 ENCOUNTER — TELEPHONE (OUTPATIENT)
Dept: TRANSPLANT | Facility: CLINIC | Age: 54
End: 2019-12-06

## 2019-12-11 PROCEDURE — 86832 HLA CLASS I HIGH DEFIN QUAL: CPT | Mod: PO,TXP

## 2019-12-11 PROCEDURE — 86833 HLA CLASS II HIGH DEFIN QUAL: CPT | Mod: PO,TXP

## 2019-12-16 ENCOUNTER — LAB VISIT (OUTPATIENT)
Dept: LAB | Facility: HOSPITAL | Age: 54
End: 2019-12-16
Payer: COMMERCIAL

## 2019-12-16 DIAGNOSIS — Z76.82 AWAITING ORGAN TRANSPLANT STATUS: ICD-10-CM

## 2019-12-19 LAB — HPRA INTERPRETATION: NORMAL

## 2020-01-02 LAB
CLASS I ANTIBODIES - LUMINEX: NORMAL
CLASS I ANTIBODY COMMENTS - LUMINEX: NORMAL
CLASS II ANTIBODY COMMENTS - LUMINEX: NORMAL
CPRA %: 3
SERUM COLLECTION DT - LUMINEX CLASS I: NORMAL
SERUM COLLECTION DT - LUMINEX CLASS II: NORMAL
SPCL1 TESTING DATE: NORMAL
SPCL2 TESTING DATE: NORMAL
SPCLU TESTING DATE: NORMAL

## 2020-01-07 PROCEDURE — 99001 SPECIMEN HANDLING PT-LAB: CPT | Mod: PO,TXP

## 2020-01-08 ENCOUNTER — TELEPHONE (OUTPATIENT)
Dept: TRANSPLANT | Facility: CLINIC | Age: 55
End: 2020-01-08

## 2020-01-13 ENCOUNTER — LAB VISIT (OUTPATIENT)
Dept: LAB | Facility: HOSPITAL | Age: 55
End: 2020-01-13
Payer: COMMERCIAL

## 2020-01-13 DIAGNOSIS — Z76.82 AWAITING ORGAN TRANSPLANT STATUS: ICD-10-CM

## 2020-02-03 PROCEDURE — 86833 HLA CLASS II HIGH DEFIN QUAL: CPT | Mod: PO,TXP

## 2020-02-03 PROCEDURE — 86832 HLA CLASS I HIGH DEFIN QUAL: CPT | Mod: PO,TXP

## 2020-02-06 ENCOUNTER — LAB VISIT (OUTPATIENT)
Dept: LAB | Facility: HOSPITAL | Age: 55
End: 2020-02-06
Payer: COMMERCIAL

## 2020-02-06 DIAGNOSIS — Z76.82 AWAITING ORGAN TRANSPLANT STATUS: ICD-10-CM

## 2020-02-15 LAB — HPRA INTERPRETATION: NORMAL

## 2020-02-26 LAB
CLASS I ANTIBODIES - LUMINEX: NORMAL
CLASS I ANTIBODY COMMENTS - LUMINEX: NORMAL
CLASS II ANTIBODY COMMENTS - LUMINEX: NORMAL
CPRA %: 2
SERUM COLLECTION DT - LUMINEX CLASS I: NORMAL
SERUM COLLECTION DT - LUMINEX CLASS II: NORMAL
SPCL1 TESTING DATE: NORMAL
SPCL2 TESTING DATE: NORMAL
SPCLU TESTING DATE: NORMAL

## 2020-03-01 PROCEDURE — 99001 SPECIMEN HANDLING PT-LAB: CPT | Mod: PO,TXP

## 2020-03-04 ENCOUNTER — LAB VISIT (OUTPATIENT)
Dept: LAB | Facility: HOSPITAL | Age: 55
End: 2020-03-04
Payer: COMMERCIAL

## 2020-03-04 DIAGNOSIS — Z76.82 ORGAN TRANSPLANT CANDIDATE: ICD-10-CM

## 2020-04-05 PROCEDURE — 86832 HLA CLASS I HIGH DEFIN QUAL: CPT | Mod: PO,TXP

## 2020-04-05 PROCEDURE — 86833 HLA CLASS II HIGH DEFIN QUAL: CPT | Mod: PO,TXP

## 2020-04-13 ENCOUNTER — LAB VISIT (OUTPATIENT)
Dept: LAB | Facility: HOSPITAL | Age: 55
End: 2020-04-13
Payer: COMMERCIAL

## 2020-04-13 DIAGNOSIS — Z76.82 ORGAN TRANSPLANT CANDIDATE: ICD-10-CM

## 2020-04-16 LAB — HPRA INTERPRETATION: NORMAL

## 2020-06-08 PROCEDURE — 86832 HLA CLASS I HIGH DEFIN QUAL: CPT | Mod: PO,TXP

## 2020-06-08 PROCEDURE — 86833 HLA CLASS II HIGH DEFIN QUAL: CPT | Mod: PO,TXP

## 2020-06-11 ENCOUNTER — LAB VISIT (OUTPATIENT)
Dept: LAB | Facility: HOSPITAL | Age: 55
End: 2020-06-11
Payer: COMMERCIAL

## 2020-06-11 DIAGNOSIS — Z76.82 ORGAN TRANSPLANT CANDIDATE: ICD-10-CM

## 2020-06-15 LAB — HPRA INTERPRETATION: NORMAL

## 2020-07-06 PROCEDURE — 99001 SPECIMEN HANDLING PT-LAB: CPT | Mod: PO,TXP

## 2020-07-09 ENCOUNTER — LAB VISIT (OUTPATIENT)
Dept: LAB | Facility: HOSPITAL | Age: 55
End: 2020-07-09
Payer: COMMERCIAL

## 2020-07-09 DIAGNOSIS — Z76.82 ORGAN TRANSPLANT CANDIDATE: ICD-10-CM

## 2020-07-29 DIAGNOSIS — Z76.82 ORGAN TRANSPLANT CANDIDATE: Primary | ICD-10-CM

## 2020-07-29 LAB — CRC RECOMMENDATION EXT: NORMAL

## 2020-07-29 NOTE — PROGRESS NOTES
YEARLY LIST MANAGEMENT NOTE    Spike Estrella's kidney transplant listing status reviewed.  Patient is due for follow-up appointments in October 2020.  Appointments will be scheduled per protocol.  HLA sample is current and being rec'd on a regular basis.

## 2020-07-30 ENCOUNTER — TELEPHONE (OUTPATIENT)
Dept: TRANSPLANT | Facility: CLINIC | Age: 55
End: 2020-07-30

## 2020-08-06 PROCEDURE — 86833 HLA CLASS II HIGH DEFIN QUAL: CPT | Mod: PO,TXP

## 2020-08-06 PROCEDURE — 86832 HLA CLASS I HIGH DEFIN QUAL: CPT | Mod: PO,TXP

## 2020-08-10 ENCOUNTER — LAB VISIT (OUTPATIENT)
Dept: LAB | Facility: HOSPITAL | Age: 55
End: 2020-08-10
Payer: COMMERCIAL

## 2020-08-10 DIAGNOSIS — Z76.82 ORGAN TRANSPLANT CANDIDATE: ICD-10-CM

## 2020-08-13 LAB — HPRA INTERPRETATION: NORMAL

## 2020-09-17 PROCEDURE — 99001 SPECIMEN HANDLING PT-LAB: CPT | Mod: PO,TXP

## 2020-09-22 ENCOUNTER — LAB VISIT (OUTPATIENT)
Dept: LAB | Facility: HOSPITAL | Age: 55
End: 2020-09-22
Payer: COMMERCIAL

## 2020-09-22 DIAGNOSIS — Z76.82 ORGAN TRANSPLANT CANDIDATE: ICD-10-CM

## 2020-10-09 ENCOUNTER — HOSPITAL ENCOUNTER (OUTPATIENT)
Dept: RADIOLOGY | Facility: HOSPITAL | Age: 55
Discharge: HOME | End: 2020-10-09
Attending: NURSE PRACTITIONER
Payer: MEDICARE

## 2020-10-09 ENCOUNTER — OFFICE VISIT (OUTPATIENT)
Dept: TRANSPLANT | Facility: CLINIC | Age: 55
End: 2020-10-09
Payer: MEDICARE

## 2020-10-09 ENCOUNTER — HOSPITAL ENCOUNTER (OUTPATIENT)
Dept: CARDIOLOGY | Facility: HOSPITAL | Age: 55
Discharge: HOME | End: 2020-10-09
Attending: NURSE PRACTITIONER
Payer: MEDICARE

## 2020-10-09 VITALS
HEIGHT: 73 IN | WEIGHT: 219.81 LBS | SYSTOLIC BLOOD PRESSURE: 138 MMHG | BODY MASS INDEX: 29.13 KG/M2 | OXYGEN SATURATION: 99 % | DIASTOLIC BLOOD PRESSURE: 72 MMHG | RESPIRATION RATE: 16 BRPM | HEART RATE: 66 BPM

## 2020-10-09 VITALS
WEIGHT: 225 LBS | DIASTOLIC BLOOD PRESSURE: 70 MMHG | BODY MASS INDEX: 29.82 KG/M2 | HEART RATE: 70 BPM | SYSTOLIC BLOOD PRESSURE: 138 MMHG | HEIGHT: 73 IN

## 2020-10-09 DIAGNOSIS — N18.5 STAGE 5 CHRONIC KIDNEY DISEASE NOT ON CHRONIC DIALYSIS: ICD-10-CM

## 2020-10-09 DIAGNOSIS — Z76.82 PATIENT ON WAITING LIST FOR KIDNEY TRANSPLANT: Primary | Chronic | ICD-10-CM

## 2020-10-09 DIAGNOSIS — Z76.82 ORGAN TRANSPLANT CANDIDATE: ICD-10-CM

## 2020-10-09 DIAGNOSIS — N25.81 SECONDARY HYPERPARATHYROIDISM OF RENAL ORIGIN: Chronic | ICD-10-CM

## 2020-10-09 DIAGNOSIS — N18.6 ESRD ON HEMODIALYSIS: ICD-10-CM

## 2020-10-09 DIAGNOSIS — D63.1 ANEMIA OF RENAL DISEASE: Chronic | ICD-10-CM

## 2020-10-09 DIAGNOSIS — M32.0 DRUG-INDUCED SYSTEMIC LUPUS ERYTHEMATOSUS, UNSPECIFIED ORGAN INVOLVEMENT STATUS: ICD-10-CM

## 2020-10-09 DIAGNOSIS — N18.9 ANEMIA OF RENAL DISEASE: Chronic | ICD-10-CM

## 2020-10-09 DIAGNOSIS — Z99.2 ESRD ON HEMODIALYSIS: ICD-10-CM

## 2020-10-09 DIAGNOSIS — I10 ESSENTIAL HYPERTENSION: Chronic | ICD-10-CM

## 2020-10-09 LAB
ASCENDING AORTA: 3.2 CM
AV INDEX (PROSTH): 0.44
AV MEAN GRADIENT: 23 MMHG
AV PEAK GRADIENT: 34 MMHG
AV VALVE AREA: 1.83 CM2
AV VELOCITY RATIO: 0.45
BSA FOR ECHO PROCEDURE: 2.29 M2
CV ECHO LV RWT: 0.41 CM
DOP CALC AO PEAK VEL: 2.91 M/S
DOP CALC AO VTI: 63.89 CM
DOP CALC LVOT AREA: 4.1 CM2
DOP CALC LVOT DIAMETER: 2.29 CM
DOP CALC LVOT PEAK VEL: 1.3 M/S
DOP CALC LVOT STROKE VOLUME: 116.66 CM3
DOP CALCLVOT PEAK VEL VTI: 28.34 CM
E WAVE DECELERATION TIME: 219.4 MSEC
E/A RATIO: 0.82
E/E' RATIO: 8.95 M/S
ECHO LV POSTERIOR WALL: 0.96 CM (ref 0.6–1.1)
FRACTIONAL SHORTENING: 40 % (ref 28–44)
HR TR ECHO: 71 BPM
INTERVENTRICULAR SEPTUM: 0.91 CM (ref 0.6–1.1)
LA MAJOR: 5.8 CM
LA MINOR: 6 CM
LA WIDTH: 4.27 CM
LEFT ATRIUM SIZE: 4.46 CM
LEFT ATRIUM VOLUME INDEX: 42.2 ML/M2
LEFT ATRIUM VOLUME: 95.48 CM3
LEFT INTERNAL DIMENSION IN SYSTOLE: 2.85 CM (ref 2.1–4)
LEFT VENTRICLE DIASTOLIC VOLUME INDEX: 45.9 ML/M2
LEFT VENTRICLE DIASTOLIC VOLUME: 103.83 ML
LEFT VENTRICLE MASS INDEX: 67 G/M2
LEFT VENTRICLE SYSTOLIC VOLUME INDEX: 13.6 ML/M2
LEFT VENTRICLE SYSTOLIC VOLUME: 30.84 ML
LEFT VENTRICULAR INTERNAL DIMENSION IN DIASTOLE: 4.73 CM (ref 3.5–6)
LEFT VENTRICULAR MASS: 151.75 G
LV LATERAL E/E' RATIO: 7.73 M/S
LV SEPTAL E/E' RATIO: 10.63 M/S
MV MEAN GRADIENT: 6 MMHG
MV PEAK A VEL: 1.04 M/S
MV PEAK E VEL: 0.85 M/S
MV STENOSIS PRESSURE HALF TIME: 63.62 MS
MV VALVE AREA P 1/2 METHOD: 3.46 CM2
PISA TR MAX VEL: 2.21 M/S
RA MAJOR: 4.85 CM
RA PRESSURE: 3 MMHG
RA WIDTH: 3.66 CM
RIGHT VENTRICULAR END-DIASTOLIC DIMENSION: 3.89 CM
RV TISSUE DOPPLER FREE WALL SYSTOLIC VELOCITY 1 (APICAL 4 CHAMBER VIEW): 8.94 CM/S
SINUS: 3.37 CM
STJ: 3.06 CM
TDI LATERAL: 0.11 M/S
TDI SEPTAL: 0.08 M/S
TDI: 0.1 M/S
TR MAX PG: 20 MMHG
TRICUSPID ANNULAR PLANE SYSTOLIC EXCURSION: 1.79 CM
TV REST PULMONARY ARTERY PRESSURE: 23 MMHG

## 2020-10-09 PROCEDURE — 93306 TTE W/DOPPLER COMPLETE: CPT | Mod: 26,TXP,, | Performed by: INTERNAL MEDICINE

## 2020-10-09 PROCEDURE — 99214 OFFICE O/P EST MOD 30 MIN: CPT | Mod: PBBFAC,25,TXP | Performed by: NURSE PRACTITIONER

## 2020-10-09 PROCEDURE — 71046 XR CHEST PA AND LATERAL: ICD-10-PCS | Mod: 26,TXP,, | Performed by: RADIOLOGY

## 2020-10-09 PROCEDURE — 71046 X-RAY EXAM CHEST 2 VIEWS: CPT | Mod: TC,TXP

## 2020-10-09 PROCEDURE — 93306 ECHO (CUPID ONLY): ICD-10-PCS | Mod: 26,TXP,, | Performed by: INTERNAL MEDICINE

## 2020-10-09 PROCEDURE — 99999 PR PBB SHADOW E&M-EST. PATIENT-LVL IV: CPT | Mod: PBBFAC,TXP,, | Performed by: NURSE PRACTITIONER

## 2020-10-09 PROCEDURE — 99215 PR OFFICE/OUTPT VISIT, EST, LEVL V, 40-54 MIN: ICD-10-PCS | Mod: S$PBB,TXP,, | Performed by: NURSE PRACTITIONER

## 2020-10-09 PROCEDURE — 99999 PR PBB SHADOW E&M-EST. PATIENT-LVL IV: ICD-10-PCS | Mod: PBBFAC,TXP,, | Performed by: NURSE PRACTITIONER

## 2020-10-09 PROCEDURE — 93306 TTE W/DOPPLER COMPLETE: CPT | Mod: TXP

## 2020-10-09 PROCEDURE — 71046 X-RAY EXAM CHEST 2 VIEWS: CPT | Mod: 26,TXP,, | Performed by: RADIOLOGY

## 2020-10-09 PROCEDURE — 99215 OFFICE O/P EST HI 40 MIN: CPT | Mod: S$PBB,TXP,, | Performed by: NURSE PRACTITIONER

## 2020-10-09 RX ORDER — CHOLECALCIFEROL (VITAMIN D3) 25 MCG
1000 TABLET ORAL DAILY
COMMUNITY

## 2020-10-09 RX ORDER — FERROUS SULFATE 300 MG/5ML
300 LIQUID (ML) ORAL DAILY
COMMUNITY
End: 2024-01-17

## 2020-10-09 NOTE — PROGRESS NOTES
Kidney Transplant Recipient Reevalulation    Referring Physician: Oanh López  Current Nephrologist: Oanh López  Waitlist Status: active  Dialysis Start Date: (Not currently on dialysis)    Subjective:     CC:  Annual reassessment of kidney transplant candidacy.    HPI:  Mr. Estrella is a 55 y.o. year old White male with ESRD secondary to lupus nephritis.  He has been on the wait list for a kidney transplant at [facility] since 10/13/2017. Patient is currently on home  hemodialysis started on 10/29/2019. Patient is dialyzing on 4x/week for 2 1/2 hour per session..  Patient reports that he is tolerating dialysis well. He has a LUE AV graft.  NO Recent hospitalizations or ED visits.     Continues to F/U with rheumatology.  He recently changed Rheumatologist . He now is following with Dr Sebastian Stevensoner   Remains On MMF for the past 4 + years , tolerating well.  Continues to work during the week. Over all looks good. Finds he gets fatigued quicker and feels its due to fluid.     10/9/2020 CXR  Impression:  No acute process seen.    Pt follows with a local cardiologist   Dr. Miller / [place]     10/9/2020 TTE  ConclusioN  · The left ventricle is normal in size with normal systolic function. The estimated ejection fraction is 65%.  · Moderate left atrial enlargement.  · Nodular thicking of mitral valve with mild restriction possible Libman-Sacks Endocarditis as noted previously.  · There is a 23 mm Medtronic Mosaic porcine bioprosthetic aortic valve present.  · Moderate mitral regurgitation.  · Mild tricuspid regurgitation.  · Normal right ventricular systolic function.  · Indeterminate diastolic function.  · Mild right atrial enlargement.  · The mean diastolic gradient across the mitral valve is 6 mmHg at a heart rate of 71 bpm.  · The mean gradient is at 71 bpm.  · Moderate aortic valve stenosis.  · Aortic valve area is 1.83 cm2; peak velocity is 2.91 m/s; mean gradient is 23 mmHg.  · Normal central venous pressure (3  "mmHg).  · The estimated PA systolic pressure is 23 mmHg.         PSA, SCREEN (ng/mL)   Date Value   10/09/2020 0.58     (Care every where)      Review of Systems   Constitutional: Negative for activity change, appetite change, chills, fatigue, fever and unexpected weight change.   HENT: Negative for congestion, facial swelling, postnasal drip, rhinorrhea, sinus pressure, sore throat and trouble swallowing.    Eyes: Positive for visual disturbance. Negative for pain and redness.        Wears glasses   Respiratory: Negative for cough, chest tightness, shortness of breath and wheezing.    Cardiovascular: Negative for chest pain, palpitations and leg swelling.   Gastrointestinal: Negative for abdominal pain, diarrhea, nausea and vomiting.   Genitourinary: Negative for dysuria, flank pain and urgency.   Musculoskeletal: Negative for gait problem, neck pain and neck stiffness.   Skin: Negative for rash.   Allergic/Immunologic: Positive for immunocompromised state. Negative for environmental allergies and food allergies.        HX SLE--> taking Plaquenil and MMF   Neurological: Negative for dizziness, weakness, light-headedness and headaches.   Psychiatric/Behavioral: Negative for agitation and confusion. The patient is not nervous/anxious.        Objective:   body mass index is 29 kg/m².  /72 (BP Location: Right arm, Patient Position: Sitting, BP Method: Medium (Automatic))   Pulse 66   Resp 16   Ht 6' 1" (1.854 m)   Wt 99.7 kg (219 lb 12.8 oz)   SpO2 99%   BMI 29.00 kg/m²     Physical Exam  Vitals signs reviewed.   Constitutional:       Appearance: Normal appearance. He is well-developed.   HENT:      Head: Normocephalic.      Mouth/Throat:      Pharynx: No oropharyngeal exudate.   Eyes:      General: No scleral icterus.     Conjunctiva/sclera: Conjunctivae normal.      Pupils: Pupils are equal, round, and reactive to light.   Neck:      Musculoskeletal: Normal range of motion and neck supple. "   Cardiovascular:      Rate and Rhythm: Normal rate and regular rhythm.      Heart sounds: Murmur present. Systolic murmur present with a grade of 1/6.   Pulmonary:      Effort: Pulmonary effort is normal.      Breath sounds: Normal breath sounds.   Abdominal:      General: Bowel sounds are normal. There is no distension.      Palpations: Abdomen is soft. There is no hepatomegaly, splenomegaly or mass.      Tenderness: There is no abdominal tenderness. There is no guarding or rebound. Negative signs include Green's sign and McBurney's sign.   Musculoskeletal: Normal range of motion.        Arms:       Comments: Bilateral peripheral edema +1   Lymphadenopathy:      Cervical: No cervical adenopathy.   Skin:     General: Skin is warm and dry.   Neurological:      Mental Status: He is alert and oriented to person, place, and time.      Motor: No abnormal muscle tone.      Coordination: Coordination normal.   Psychiatric:         Behavior: Behavior normal.         Labs:  Lab Results   Component Value Date    WBC 9.96 06/27/2017    HGB 13.3 (L) 06/27/2017    HCT 42.3 06/27/2017     06/27/2017    K 4.8 06/27/2017     06/27/2017    CO2 20 (L) 06/27/2017    BUN 62 (H) 06/27/2017    CREATININE 3.8 (H) 06/27/2017    EGFRNONAA 17.0 (A) 06/27/2017    CALCIUM 10.0 06/27/2017    PHOS 4.7 (H) 06/27/2017    MG 2.2 06/19/2014    ALBUMIN 4.0 06/27/2017    AST 33 06/27/2017    ALT 21 06/27/2017    UTPCR 0.72 (H) 06/27/2017    .0 (H) 10/09/2020       Lab Results   Component Value Date    BILIRUBINUA Negative 06/27/2017    PROTEINUA 1+ (A) 06/27/2017    NITRITE neg 07/24/2017    RBCUA 0 07/24/2017    WBCUA 0 06/27/2017       No results found for: HLAABCTYPE    Lab Results   Component Value Date    CPRA 3 08/06/2020    RT7SZNZ B37,A80 08/06/2020    CIABCLM WEAK Cw9, B82, B27, Cw12, Cw14, Cw17, A43, Cw6 08/06/2020    CIIAB Negative 07/07/2018    ABCMT DP1, -- WEAK DQB1*06:09  08/06/2020       Labs were reviewed with the  patient.    Pre-transplant Workup:   Reviewed with the patient.    Assessment:     1. Patient on waiting list for kidney transplant    2. Stage 5 chronic kidney disease not on chronic dialysis    3. Drug-induced systemic lupus erythematosus, unspecified organ involvement status    4. Essential hypertension    5. Anemia of renal disease    6. Secondary hyperparathyroidism of renal origin    7. ESRD on hemodialysis        Plan:     F/U REGULARLY WITH CARDIOLOGY as recommended   · Nodular thicking of mitral valve with mild restriction possible Libman-Sacks Endocarditis as noted previously.  · Moderate aortic valve stenosis.--NOT NOTED ON TTE IN 2019  Pt reports he follows with a local cardiologist   Dr. Miller / [facility]      Pt reports changing Rheumatologist . He now is following with Dr Sebastian Frost   [phone]      COLONOSCOPY  Completed in 7/2020 at [facility]   Dr Peter Bernheim /    [phone]  Get record     Transplant Candidacy:   Mr. Estrella is a suitable kidney transplant candidate.  Meets center eligibility for accepting HCV+ donor offer - yes.  Patient educated on HCV+ donors. Spike is willing  to accept HCV+ donor offer -  no Pt would like to think about this.    Patient is a candidate for KDPI > 85 kidney donor offer - no d/t weight.  He remains in overall stable health, and will remain active on the transplant list.    Jesenia White NP       Follow-up:   In addition to the tests noted in the plan, Mr. Estrella will continue to have reevaluation as per the standing pre-kidney transplant protocol:  1. Monthly blood for PRA  2. Annual return to clinic, except HIV positive, > 65 years of age, or pancreas transplant candidates who will be scheduled to see transplant every 6 months while in pre-transplant phase  3. Annual re-testing: CXR, EKG, yearly mammograms for women over 40 and PSA for males over 40, cardiology follow-up as recommended by initial cardiology pre-transplant  evaluation  4. Renal ultrasound every 2 years  5. Baseline colonoscopy after age 50 and repeated as recommended    UNOS Patient Status  Functional Status: 80% - Normal activity with effort: some symptoms of disease  Physical Capacity: No Limitations

## 2020-10-09 NOTE — PROGRESS NOTES
YEARLY PATIENT EDUCATION NOTE    Patient was seen in pre-kidney transplant clinic for yearly (or six months) evaluation for kidney, kidney/pancreas or pancreas only transplant.  The patient attended a web based education session that discussed/reviewed the following aspects of transplantation: evaluation and selection committee process, UNOS waitlist management/multiple listings, type of organs offered (KDPI < 85%, KDPI > 85%, PHS increased risk, DCD, HCV+), financial aspects, surgical procedures, dietary instruction pre- and post-transplant, health maintenance pre- and post-transplant, post-transplant hospitalization and outpatient follow-up, potential to participate in a research protocol, and medication management and side effects.  A question and answer session was provided after the presentation.    The patient was seen by the following members of the multi-disciplinary team to include: Nephrologist/PA, , , Pharmacist and Dietician (if applicable).    The patient reviewed and signed all consents for evaluation which were witnessed and sent to scanning into the EPIC chart.    The patient was given an education book and plan for further evaluation based on his individual assessment.      The patient was encouraged to call with any questions or concerns.

## 2020-10-09 NOTE — LETTER
October 12, 2020        Oanh López  [address] [facility] [place] [address]  Phone: [phone]  Fax: [phone]             [facility] 1st Fl  [address]  [facility] [address]  Phone: [phone]   Patient: Spike Estrella   MR Number: [id number]   YOB: 1965   Date of Visit: 10/9/2020       Dear Dr. Oanh López    Thank you for referring Spike Estrella to me for evaluation. Attached you will find relevant portions of my assessment and plan of care.    If you have questions, please do not hesitate to call me. I look forward to following Spike Estrella along with you.    Sincerely,    Jesenia White NP    Enclosure    If you would like to receive this communication electronically, please contact [email or web address] or [phone] to request The[employer] Link access.    Theatro Link is a tool which provides read-only access to select patient information with whom you have a relationship. Its easy to use and provides real time access to review your patients record including encounter summaries, notes, results, and demographic information.    If you feel you have received this communication in error or would no longer like to receive these types of communications, please e-mail [email or web address]

## 2020-10-15 PROCEDURE — 86832 HLA CLASS I HIGH DEFIN QUAL: CPT | Mod: PO,TXP

## 2020-10-15 PROCEDURE — 86833 HLA CLASS II HIGH DEFIN QUAL: CPT | Mod: PO,TXP

## 2020-10-19 ENCOUNTER — LAB VISIT (OUTPATIENT)
Dept: LAB | Facility: HOSPITAL | Age: 55
End: 2020-10-19
Payer: COMMERCIAL

## 2020-10-19 DIAGNOSIS — Z76.82 ORGAN TRANSPLANT CANDIDATE: ICD-10-CM

## 2020-10-27 LAB
CLASS I ANTIBODIES - LUMINEX: NORMAL
CLASS I ANTIBODY COMMENTS - LUMINEX: NORMAL
CLASS II ANTIBODY COMMENTS - LUMINEX: NORMAL
CPRA %: 2
HPRA INTERPRETATION: NORMAL
SERUM COLLECTION DT - LUMINEX CLASS I: NORMAL
SERUM COLLECTION DT - LUMINEX CLASS II: NORMAL
SPCL1 TESTING DATE: NORMAL
SPCL2 TESTING DATE: NORMAL
SPCLU TESTING DATE: NORMAL

## 2020-10-28 NOTE — PROGRESS NOTES
Transplant Recipient Adult Psychosocial Assessment Update    Spike Estrella  [address] [place] [address]  Telephone Information:   Mobile [phone]   Home  [phone] (home) [phone]  Work  There is no work phone number on file.  E-mail  [email or web address]    Sex: male  YOB: 1965  Age: 55 y.o.    Encounter Date: 10/9/2020  [place] Citizen: yes  Primary Language: English   Needed: no    Emergency Contact:  Name: Sonja Estrella  Relationship: wife  Address: same as above  Phone Numbers:  [phone] (home), n/a (work), [phone](mobile)    Family/Social Support:   Number of dependents/: 16 y/o son, Anibal Estrella  Marital history:  once to current wife of 26 yrs.  Other family dynamics: Parents are ; four brothers with whom he reports are very close.  One lives in [place], one in [place], [place], one in [place] and one lives temporarily in [facility] [place].    Household Composition:  Name: Patient and Sonja Estrella   Age: both 56y/o  Relationship: patient and wife  Does person drive? yes    Name: Anibal Estrella  Age: 17  Relationship: son  Does person drive? yes    Do you and your caregivers have access to reliable transportation? yes  PRIMARY CAREGIVER: Sonja Estrella  will be primary caregiver, phone number [phone].      provided in-depth information to patient and caregiver regarding pre- and post-transplant caregiver role.   strongly encourages patient and caregiver to have concrete plan regarding post-transplant care giving, including back-up caregiver(s) to ensure care giving needs are met as needed.    Patient and Caregiver states understanding all aspects of caregiver role/commitment and is able/willing/committed to being caregiver to the fullest extent necessary.    Patient and Caregiver verbalizes understanding of the education provided today and caregiver responsibilities.         remains available. Patient and  Caregiver agree to contact  in a timely manner if concerns arise.      Able to take time off work without financial concerns: yes.     Additional Significant Others who will Assist with Transplant:  Name: Danny Estrella and his wife, Agnie [phone]  [phone]  Age: 55  City: [place] State: [place]  Relationship: brother and sister in law  Does person drive? yes      Living Will: no  Healthcare Power of : yes  Advance Directives on file: <<no information> per medical record.  Verbally reviewed LW/HCPA information.   provided patient with copy of LW/HCPA documents and provided education on completion of forms.    Living Donors: No. Education and resource information given to patient.    Highest Education Level: Attended College/Technical School  Reading Ability: college  Reports difficulty with: hearing (minor hearing loss from occupational exposure)  Learns Best By:  Hands on     Status: no  VA Benefits: no     Working for Income: yes  If yes, working activity level: unemployed; laid off from job and going to apply for disability    Patient is prior to being laid off in , pt was working at [employer] since 2016. Prior to that, he worked at [employer] and [employer]..    Spouse/Significant Other Employment: Wife works full time in logistics for a contractor at [employer].      Disabled: no    Monthly Income:  Other: $combined $3400 monthly,  Able to afford all costs now and if transplanted, including medications: yes  Patient and Caregiver verbalizes understanding of personal responsibilities related to transplant costs and the importance of having a financial plan to ensure that patients transplant costs are fully covered.       provided fundraising information/education. Patient and Caregiververbalizes understanding.   remains available.    Insurance:   Payor/Plan Subscr  Sex Relation Sub. Ins. ID Effective  Group Num   1. BLUE CROSS BL* YAMILET STEVENS 1965 Male  [id number] 4/1/17 [id number]                                   [address] [address]   2. MEDICARE - [place]* YAMILET STEVENS 1965 Male Self [id number] 1/1/20                                    [address]       Primary Insurance (for UNOS reporting): Private Insurance paid for by wife  Secondary Insurance (for UNOS reporting): None  Patient and Caregiver verbalizes clear understanding that patient may experience difficulty obtaining and/or be denied insurance coverage post-surgery. This includes and is not limited to disability insurance, life insurance, health insurance, burial insurance, long term care insurance, and other insurances.      Patient and Caregiver also reports understanding that future health concerns related to or unrelated to transplantation may not be covered by patient's insurance.  Resources and information provided and reviewed.     Patient and Caregiver provides verbal permission to release any necessary information to outside resources for patient care and discharge planning.  Resources and information provided are reviewed.      Dialysis Adherence: Patient and Caregiver reports he is on  home hemo and is very compliant with MD recommendations.  Pt has hx of good nephrology compliance reports.     Infusion Service: patient utilizing? no  Home Health: patient utilizing? no  DME: home hemo equipment and supplies  Pulmonary/Cardiac Rehab: none   ADLS:  Pt states ability to independently accomplish all adls.    Adherence:   Pt states current and expected compliance with all healthcare recommendations..  Adherence education and counseling provided.     Per History Section:  Past Medical History:   Diagnosis Date    Anemia of renal disease     Essential hypertension     Hx of aortic valve replacement     Immunosuppressed status     Lupus nephritis     Metabolic acidosis     Secondary hyperparathyroidism of renal origin     Stage 4  chronic kidney disease     Stenosis and insufficiency of lacrimal passages     Systemic lupus erythematosus      Social History     Tobacco Use    Smoking status: Never Smoker    Smokeless tobacco: Former User     Types: Snuff, Chew   Substance Use Topics    Alcohol use: Yes     Alcohol/week: 1.0 standard drinks     Types: 1 Cans of beer per week     Comment: occasionally      Social History     Substance and Sexual Activity   Drug Use No     Social History     Substance and Sexual Activity   Sexual Activity Not on file       Per Today's Psychosocial:  Tobacco: none, patient denies any use.  Pt reports he dipped and chewed but quit 20 yrs ago.  Alcohol: occasional.  Illicit Drugs/Non-prescribed Medications: none, patient denies any use.    Patient and Caregiver states clear understanding of the potential impact of substance use as it relates to transplant candidacy and is aware of possible random substance screening.  Substance abstinence/cessation counseling, education and resources provided and reviewed.     Arrests/DWI/Treatment/Rehab: patient denies    Psychiatric History:    Mental Health: pt denied any mental health concerns; states he has made transition to home dialysis very well and coping adequately.  Psychiatrist/Counselor: none  Medications:  none  Suicide/Homicide Issues: Pt denies current or past suicidal/homicidal ideation.   Safety at home: Pt denies any home safety concerns.    Knowledge: Patient and Caregiver states having clear understanding and realistic expectations regarding the potential risks and potential benefits of organ transplantation and organ donation and agrees to discuss with health care team members and support system members, as well as to utilize available resources and express questions and/or concerns in order to further facilitate the pt informed decision-making.  Resources and information provided and reviewed.    Patient and Caregiver is aware of [facility]'s affiliation  and/or partnership with agencies in home health care, LTAC, SNF, [facility], and other hospitals and clinics.    Understanding: Patient and Caregiver reports having a clear understanding of the many lifetime commitments involved with being a transplant recipient, including costs, compliance, medications, lab work, procedures, appointments, concrete and financial planning, preparedness, timely and appropriate communication of concerns, abstinence (ETOH, tobacco, illicit non-prescribed drugs), adherence to all health care team recommendations, support system and caregiver involvement, appropriate and timely resource utilization and follow-through, mental health counseling as needed/recommended, and patient and caregiver responsibilities.  Social Service Handbook, resources and detailed educational information provided and reviewed.  Educational information provided.    Patient and Caregiver also reports current and expected compliance with health care regime and states having a clear understanding of the importance of compliance.      Patient and Caregiver reports a clear understanding that risks and benefits may be involved with organ transplantation and with organ donation.       Patient and Caregiver also reports clear understanding that psychosocial risk factors may affect patient, and include but are not limited to feelings of depression, generalized anxiety, anxiety regarding dependence on others, post traumatic stress disorder, feelings of guilt and other emotional and/or mental concerns, and/or exacerbation of existing mental health concerns.  Detailed resources provided and discussed.      Patient and Caregiver agrees to access appropriate resources in a timely manner as needed and/or as recommended, and to communicate concerns appropriately.  Patient and Caregiver also reports a clear understanding of treatment options available.     Patient and Caregiver received education in a group setting.    reviewed education, provided additional information, and answered questions.    Feelings or Concerns: Pt stated his only concern is to be around to see his son grow up.      Coping: Pt reports he chema through attending mass at [employer], watching and participating in sports (soccer, football, golf, NASCAR) and spending time with family, especially son's activities.     Goals: Avoid dialysis, see son grow up..  Patient referred to Vocational Rehabilitation.    Interview Behavior: Patient and Caregiver presents as alert and oriented x 4, pleasant, good eye contact, well groomed, recall good, concentration/judgement good, average intelligence, calm, communicative, cooperative and asking and answering questions appropriately. He was accompanied by his wife who appeared very supportive of pt's pursuit of transplant.         Transplant Social Work - Candidacy  Assessment/Plan:     Psychosocial Suitability: Based on psychosocial risk factors, patient presents as low risk, due to absence of significant psychosocial risk factors.  He has financial plan and family support.    Recommendations/Additional Comments: DENAE recommends that pt conduct fundraising to assist pt with pay for cost of medication, food,gas, and other transplant related expenses. SW recommends that pt remain free of all tobacco,ETOH, and substance use. SW remains available to assist with any concerns that may arise as pt navigates through the transplant process.        Oumou Vizcarra LCSW

## 2020-10-29 ENCOUNTER — TELEPHONE (OUTPATIENT)
Dept: TRANSPLANT | Facility: CLINIC | Age: 55
End: 2020-10-29

## 2020-10-29 NOTE — TELEPHONE ENCOUNTER
"Compliance check:  Dialysis unit reports in the past three months pt has had "none"no shows, "none" AMAs, labs , no lab concerns , transportation no concerns noted and family support no concerns noted.    Reported by Jr. ALLAN Sanders via fax back sheet.    "

## 2020-11-09 PROCEDURE — 99001 SPECIMEN HANDLING PT-LAB: CPT | Mod: PO,TXP

## 2020-11-13 ENCOUNTER — LAB VISIT (OUTPATIENT)
Dept: LAB | Facility: HOSPITAL | Age: 55
End: 2020-11-13
Payer: COMMERCIAL

## 2020-11-13 DIAGNOSIS — Z76.82 ORGAN TRANSPLANT CANDIDATE: ICD-10-CM

## 2020-12-07 PROCEDURE — 86833 HLA CLASS II HIGH DEFIN QUAL: CPT | Mod: PO,TXP

## 2020-12-07 PROCEDURE — 86832 HLA CLASS I HIGH DEFIN QUAL: CPT | Mod: PO,TXP

## 2020-12-11 ENCOUNTER — LAB VISIT (OUTPATIENT)
Dept: LAB | Facility: HOSPITAL | Age: 55
End: 2020-12-11
Payer: COMMERCIAL

## 2020-12-11 DIAGNOSIS — Z76.82 ORGAN TRANSPLANT CANDIDATE: ICD-10-CM

## 2020-12-17 LAB — HPRA INTERPRETATION: NORMAL

## 2021-01-08 ENCOUNTER — PATIENT MESSAGE (OUTPATIENT)
Dept: TRANSPLANT | Facility: CLINIC | Age: 56
End: 2021-01-08

## 2021-01-14 PROCEDURE — 99001 SPECIMEN HANDLING PT-LAB: CPT | Mod: PO,TXP

## 2021-01-19 ENCOUNTER — LAB VISIT (OUTPATIENT)
Dept: LAB | Facility: HOSPITAL | Age: 56
End: 2021-01-19
Payer: COMMERCIAL

## 2021-01-19 DIAGNOSIS — Z76.82 ORGAN TRANSPLANT CANDIDATE: ICD-10-CM

## 2021-03-08 PROCEDURE — 86833 HLA CLASS II HIGH DEFIN QUAL: CPT | Mod: PO,TXP | Performed by: NURSE PRACTITIONER

## 2021-03-08 PROCEDURE — 86832 HLA CLASS I HIGH DEFIN QUAL: CPT | Mod: PO,TXP | Performed by: NURSE PRACTITIONER

## 2021-03-11 ENCOUNTER — LAB VISIT (OUTPATIENT)
Dept: LAB | Facility: HOSPITAL | Age: 56
End: 2021-03-11
Payer: COMMERCIAL

## 2021-03-11 DIAGNOSIS — Z76.82 ORGAN TRANSPLANT CANDIDATE: ICD-10-CM

## 2021-03-23 LAB — HPRA INTERPRETATION: NORMAL

## 2021-04-14 PROCEDURE — 99001 SPECIMEN HANDLING PT-LAB: CPT | Mod: PO,TXP | Performed by: NURSE PRACTITIONER

## 2021-04-20 ENCOUNTER — LAB VISIT (OUTPATIENT)
Dept: LAB | Facility: HOSPITAL | Age: 56
End: 2021-04-20
Payer: COMMERCIAL

## 2021-04-20 DIAGNOSIS — Z76.82 ORGAN TRANSPLANT CANDIDATE: ICD-10-CM

## 2021-05-09 PROCEDURE — 86833 HLA CLASS II HIGH DEFIN QUAL: CPT | Mod: PO,TXP | Performed by: NURSE PRACTITIONER

## 2021-05-09 PROCEDURE — 86832 HLA CLASS I HIGH DEFIN QUAL: CPT | Mod: 59,PO,TXP | Performed by: NURSE PRACTITIONER

## 2021-05-09 PROCEDURE — 86977 RBC SERUM PRETX INCUBJ/INHIB: CPT | Mod: PO,TXP | Performed by: NURSE PRACTITIONER

## 2021-05-13 ENCOUNTER — LAB VISIT (OUTPATIENT)
Dept: LAB | Facility: HOSPITAL | Age: 56
End: 2021-05-13
Payer: COMMERCIAL

## 2021-05-13 DIAGNOSIS — Z76.82 ORGAN TRANSPLANT CANDIDATE: ICD-10-CM

## 2021-05-25 LAB — HPRA INTERPRETATION: NORMAL

## 2021-06-08 LAB
CLASS I ANTIBODIES - LUMINEX: NEGATIVE
CLASS I ANTIBODY COMMENTS - LUMINEX: NORMAL
CLASS II ANTIBODIES - LUMINEX: NEGATIVE
CPRA %: 0
CPRA %: 0
SERUM COLLECTION DT - LUMINEX CLASS I: NORMAL
SERUM COLLECTION DT - LUMINEX CLASS I: NORMAL
SERUM COLLECTION DT - LUMINEX CLASS II: NORMAL
SPCL1 TESTING DATE: NORMAL
SPCL1 TESTING DATE: NORMAL
SPCL2 TESTING DATE: NORMAL
SPCLU TESTING DATE: NORMAL

## 2021-06-18 DIAGNOSIS — Z76.82 ORGAN TRANSPLANT CANDIDATE: Primary | ICD-10-CM

## 2021-06-25 ENCOUNTER — TELEPHONE (OUTPATIENT)
Dept: TRANSPLANT | Facility: CLINIC | Age: 56
End: 2021-06-25

## 2021-08-10 PROCEDURE — 86833 HLA CLASS II HIGH DEFIN QUAL: CPT | Mod: PO,TXP | Performed by: NURSE PRACTITIONER

## 2021-08-10 PROCEDURE — 86832 HLA CLASS I HIGH DEFIN QUAL: CPT | Mod: PO,TXP | Performed by: NURSE PRACTITIONER

## 2021-08-12 ENCOUNTER — LAB VISIT (OUTPATIENT)
Dept: LAB | Facility: HOSPITAL | Age: 56
End: 2021-08-12
Payer: COMMERCIAL

## 2021-08-12 DIAGNOSIS — Z76.82 ORGAN TRANSPLANT CANDIDATE: ICD-10-CM

## 2021-08-26 LAB — HPRA INTERPRETATION: NORMAL

## 2021-09-02 ENCOUNTER — TELEPHONE (OUTPATIENT)
Dept: TRANSPLANT | Facility: CLINIC | Age: 56
End: 2021-09-02

## 2021-09-14 PROCEDURE — 99001 SPECIMEN HANDLING PT-LAB: CPT | Mod: PO,TXP | Performed by: NURSE PRACTITIONER

## 2021-09-17 ENCOUNTER — TELEPHONE (OUTPATIENT)
Dept: TRANSPLANT | Facility: CLINIC | Age: 56
End: 2021-09-17

## 2021-09-21 ENCOUNTER — LAB VISIT (OUTPATIENT)
Dept: LAB | Facility: HOSPITAL | Age: 56
End: 2021-09-21
Payer: COMMERCIAL

## 2021-09-21 DIAGNOSIS — Z76.82 ORGAN TRANSPLANT CANDIDATE: ICD-10-CM

## 2021-10-12 PROCEDURE — 86832 HLA CLASS I HIGH DEFIN QUAL: CPT | Mod: PO,TXP | Performed by: NURSE PRACTITIONER

## 2021-10-12 PROCEDURE — 86833 HLA CLASS II HIGH DEFIN QUAL: CPT | Mod: PO,TXP | Performed by: NURSE PRACTITIONER

## 2021-10-18 ENCOUNTER — LAB VISIT (OUTPATIENT)
Dept: LAB | Facility: HOSPITAL | Age: 56
End: 2021-10-18
Payer: COMMERCIAL

## 2021-10-18 DIAGNOSIS — Z76.82 ORGAN TRANSPLANT CANDIDATE: ICD-10-CM

## 2021-11-03 ENCOUNTER — TELEPHONE (OUTPATIENT)
Dept: CARDIOLOGY | Facility: HOSPITAL | Age: 56
End: 2021-11-03
Payer: MEDICARE

## 2021-11-05 ENCOUNTER — OFFICE VISIT (OUTPATIENT)
Dept: TRANSPLANT | Facility: CLINIC | Age: 56
End: 2021-11-05
Payer: MEDICARE

## 2021-11-05 ENCOUNTER — HOSPITAL ENCOUNTER (OUTPATIENT)
Dept: CARDIOLOGY | Facility: HOSPITAL | Age: 56
Discharge: HOME | End: 2021-11-05
Attending: NURSE PRACTITIONER
Payer: MEDICARE

## 2021-11-05 ENCOUNTER — HOSPITAL ENCOUNTER (OUTPATIENT)
Dept: RADIOLOGY | Facility: HOSPITAL | Age: 56
Discharge: HOME | End: 2021-11-05
Attending: NURSE PRACTITIONER
Payer: MEDICARE

## 2021-11-05 VITALS
HEART RATE: 89 BPM | SYSTOLIC BLOOD PRESSURE: 130 MMHG | HEIGHT: 73 IN | BODY MASS INDEX: 29.03 KG/M2 | WEIGHT: 219 LBS | DIASTOLIC BLOOD PRESSURE: 75 MMHG

## 2021-11-05 VITALS — WEIGHT: 219 LBS | BODY MASS INDEX: 29.03 KG/M2 | HEIGHT: 73 IN

## 2021-11-05 VITALS
OXYGEN SATURATION: 99 % | DIASTOLIC BLOOD PRESSURE: 64 MMHG | BODY MASS INDEX: 29.48 KG/M2 | HEIGHT: 72 IN | TEMPERATURE: 98 F | HEART RATE: 77 BPM | RESPIRATION RATE: 16 BRPM | WEIGHT: 217.63 LBS | SYSTOLIC BLOOD PRESSURE: 135 MMHG

## 2021-11-05 DIAGNOSIS — Z76.82 ORGAN TRANSPLANT CANDIDATE: ICD-10-CM

## 2021-11-05 DIAGNOSIS — Z95.2 HX OF AORTIC VALVE REPLACEMENT: ICD-10-CM

## 2021-11-05 DIAGNOSIS — N18.6 ESRD ON HEMODIALYSIS: ICD-10-CM

## 2021-11-05 DIAGNOSIS — N18.9 ANEMIA OF RENAL DISEASE: Chronic | ICD-10-CM

## 2021-11-05 DIAGNOSIS — M32.14 LUPUS NEPHRITIS: ICD-10-CM

## 2021-11-05 DIAGNOSIS — D63.1 ANEMIA OF RENAL DISEASE: Chronic | ICD-10-CM

## 2021-11-05 DIAGNOSIS — Z99.2 ESRD ON HEMODIALYSIS: ICD-10-CM

## 2021-11-05 DIAGNOSIS — Z76.82 PATIENT ON WAITING LIST FOR KIDNEY TRANSPLANT: Primary | Chronic | ICD-10-CM

## 2021-11-05 DIAGNOSIS — I10 ESSENTIAL HYPERTENSION: Chronic | ICD-10-CM

## 2021-11-05 DIAGNOSIS — N25.81 SECONDARY HYPERPARATHYROIDISM OF RENAL ORIGIN: Chronic | ICD-10-CM

## 2021-11-05 LAB
ASCENDING AORTA: 3.33 CM
AV INDEX (PROSTH): 0.51
AV MEAN GRADIENT: 28 MMHG
AV PEAK GRADIENT: 49 MMHG
AV VALVE AREA: 2.12 CM2
AV VELOCITY RATIO: 0.44
BSA FOR ECHO PROCEDURE: 2.26 M2
CV ECHO LV RWT: 0.3 CM
CV PHARM DOSE: 0.4 MG
CV STRESS BASE HR: 74 BPM
DIASTOLIC BLOOD PRESSURE: 81 MMHG
DOP CALC AO PEAK VEL: 3.5 M/S
DOP CALC AO VTI: 64.2 CM
DOP CALC LVOT AREA: 4.2 CM2
DOP CALC LVOT DIAMETER: 2.31 CM
DOP CALC LVOT PEAK VEL: 1.53 M/S
DOP CALC LVOT STROKE VOLUME: 136.35 CM3
DOP CALC MV VTI: 39.14 CM
DOP CALCLVOT PEAK VEL VTI: 32.55 CM
E WAVE DECELERATION TIME: 92.6 MSEC
E/A RATIO: 0.62
E/E' RATIO: 10.33 M/S
ECHO LV POSTERIOR WALL: 0.78 CM (ref 0.6–1.1)
EJECTION FRACTION- HIGH: 65 %
EJECTION FRACTION: 65 %
END DIASTOLIC INDEX-HIGH: 153 ML/M2
END DIASTOLIC INDEX-LOW: 93 ML/M2
END SYSTOLIC INDEX-HIGH: 71 ML/M2
END SYSTOLIC INDEX-LOW: 31 ML/M2
FRACTIONAL SHORTENING: 38 % (ref 28–44)
HR MV ECHO: 82 BPM
INTERVENTRICULAR SEPTUM: 0.82 CM (ref 0.6–1.1)
LA MAJOR: 5.05 CM
LA MINOR: 4.21 CM
LA WIDTH: 3.32 CM
LEFT ATRIUM SIZE: 4.56 CM
LEFT ATRIUM VOLUME INDEX MOD: 34.3 ML/M2
LEFT ATRIUM VOLUME INDEX: 26.4 ML/M2
LEFT ATRIUM VOLUME MOD: 76.82 CM3
LEFT ATRIUM VOLUME: 59.09 CM3
LEFT INTERNAL DIMENSION IN SYSTOLE: 3.2 CM (ref 2.1–4)
LEFT VENTRICLE DIASTOLIC VOLUME INDEX: 55.73 ML/M2
LEFT VENTRICLE DIASTOLIC VOLUME: 124.83 ML
LEFT VENTRICLE MASS INDEX: 63 G/M2
LEFT VENTRICLE SYSTOLIC VOLUME INDEX: 18.3 ML/M2
LEFT VENTRICLE SYSTOLIC VOLUME: 41.09 ML
LEFT VENTRICULAR INTERNAL DIMENSION IN DIASTOLE: 5.12 CM (ref 3.5–6)
LEFT VENTRICULAR MASS: 141.41 G
LV LATERAL E/E' RATIO: 9.3 M/S
LV SEPTAL E/E' RATIO: 11.63 M/S
MV A" WAVE DURATION": 10.85 MSEC
MV MEAN GRADIENT: 7 MMHG
MV PEAK A VEL: 1.5 M/S
MV PEAK E VEL: 0.93 M/S
MV PEAK GRADIENT: 16 MMHG
MV STENOSIS PRESSURE HALF TIME: 26.85 MS
MV VALVE AREA BY CONTINUITY EQUATION: 3.48 CM2
MV VALVE AREA P 1/2 METHOD: 8.19 CM2
NUC REST DIASTOLIC VOLUME INDEX: 159
NUC REST EJECTION FRACTION: 68
NUC REST SYSTOLIC VOLUME INDEX: 51
NUC STRESS DIASTOLIC VOLUME INDEX: 181
NUC STRESS EJECTION FRACTION: 75 %
NUC STRESS SYSTOLIC VOLUME INDEX: 46
OHS CV CPX 1 MINUTE RECOVERY HEART RATE: 83 BPM
OHS CV CPX 85 PERCENT MAX PREDICTED HEART RATE MALE: 138
OHS CV CPX MAX PREDICTED HEART RATE: 162
OHS CV CPX PATIENT IS FEMALE: 0
OHS CV CPX PATIENT IS MALE: 1
OHS CV CPX PEAK DIASTOLIC BLOOD PRESSURE: 81 MMHG
OHS CV CPX PEAK HEAR RATE: 78 BPM
OHS CV CPX PEAK RATE PRESSURE PRODUCT: NORMAL
OHS CV CPX PEAK SYSTOLIC BLOOD PRESSURE: 155 MMHG
OHS CV CPX PERCENT MAX PREDICTED HEART RATE ACHIEVED: 48
OHS CV CPX RATE PRESSURE PRODUCT PRESENTING: NORMAL
PISA MRMAX VEL: 0.06 M/S
PISA TR MAX VEL: 2.9 M/S
PULM VEIN S/D RATIO: 0.7
PV PEAK D VEL: 0.4 M/S
PV PEAK S VEL: 0.28 M/S
RA MAJOR: 4.5 CM
RA PRESSURE: 3 MMHG
RA WIDTH: 3.06 CM
RETIRED EF AND QEF - SEE NOTES: 53 %
RIGHT VENTRICULAR END-DIASTOLIC DIMENSION: 3.39 CM
RV TISSUE DOPPLER FREE WALL SYSTOLIC VELOCITY 1 (APICAL 4 CHAMBER VIEW): 12.48 CM/S
SINUS: 2.96 CM
STJ: 3.27 CM
SYSTOLIC BLOOD PRESSURE: 155 MMHG
TDI LATERAL: 0.1 M/S
TDI SEPTAL: 0.08 M/S
TDI: 0.09 M/S
TR MAX PG: 34 MMHG
TRICUSPID ANNULAR PLANE SYSTOLIC EXCURSION: 1.69 CM
TV REST PULMONARY ARTERY PRESSURE: 37 MMHG

## 2021-11-05 PROCEDURE — 99215 PR OFFICE/OUTPT VISIT, EST, LEVL V, 40-54 MIN: ICD-10-PCS | Mod: S$PBB,TXP,, | Performed by: NURSE PRACTITIONER

## 2021-11-05 PROCEDURE — 93016 CV STRESS TEST SUPVJ ONLY: CPT | Mod: TXP,,, | Performed by: INTERNAL MEDICINE

## 2021-11-05 PROCEDURE — 72170 XR PELVIS ROUTINE AP: ICD-10-PCS | Mod: 26,TXP,, | Performed by: RADIOLOGY

## 2021-11-05 PROCEDURE — 72170 X-RAY EXAM OF PELVIS: CPT | Mod: TC,TXP

## 2021-11-05 PROCEDURE — 78452 STRESS TEST WITH MYOCARDIAL PERFUSION (CUPID ONLY): ICD-10-PCS | Mod: 26,TXP,, | Performed by: INTERNAL MEDICINE

## 2021-11-05 PROCEDURE — 76770 US EXAM ABDO BACK WALL COMP: CPT | Mod: TC,TXP

## 2021-11-05 PROCEDURE — 93306 ECHO (CUPID ONLY): ICD-10-PCS | Mod: 26,TXP,, | Performed by: INTERNAL MEDICINE

## 2021-11-05 PROCEDURE — 99999 PR PBB SHADOW E&M-EST. PATIENT-LVL IV: ICD-10-PCS | Mod: PBBFAC,TXP,, | Performed by: NURSE PRACTITIONER

## 2021-11-05 PROCEDURE — 99999 PR PBB SHADOW E&M-EST. PATIENT-LVL IV: CPT | Mod: PBBFAC,TXP,, | Performed by: NURSE PRACTITIONER

## 2021-11-05 PROCEDURE — 63600175 PHARM REV CODE 636 W HCPCS: Mod: TXP | Performed by: NURSE PRACTITIONER

## 2021-11-05 PROCEDURE — A9502 TC99M TETROFOSMIN: HCPCS | Mod: TXP

## 2021-11-05 PROCEDURE — 71046 XR CHEST PA AND LATERAL: ICD-10-PCS | Mod: 26,TXP,, | Performed by: RADIOLOGY

## 2021-11-05 PROCEDURE — 93306 TTE W/DOPPLER COMPLETE: CPT | Mod: 26,TXP,, | Performed by: INTERNAL MEDICINE

## 2021-11-05 PROCEDURE — 93016 STRESS TEST WITH MYOCARDIAL PERFUSION (CUPID ONLY): ICD-10-PCS | Mod: TXP,,, | Performed by: INTERNAL MEDICINE

## 2021-11-05 PROCEDURE — 71046 X-RAY EXAM CHEST 2 VIEWS: CPT | Mod: TC,TXP

## 2021-11-05 PROCEDURE — 93018 CV STRESS TEST I&R ONLY: CPT | Mod: TXP,,, | Performed by: INTERNAL MEDICINE

## 2021-11-05 PROCEDURE — 76770 US EXAM ABDO BACK WALL COMP: CPT | Mod: 26,TXP,, | Performed by: INTERNAL MEDICINE

## 2021-11-05 PROCEDURE — 78452 HT MUSCLE IMAGE SPECT MULT: CPT | Mod: TXP

## 2021-11-05 PROCEDURE — 76770 US RETROPERITONEAL COMPLETE: ICD-10-PCS | Mod: 26,TXP,, | Performed by: INTERNAL MEDICINE

## 2021-11-05 PROCEDURE — 93306 TTE W/DOPPLER COMPLETE: CPT | Mod: TXP

## 2021-11-05 PROCEDURE — 71046 X-RAY EXAM CHEST 2 VIEWS: CPT | Mod: 26,TXP,, | Performed by: RADIOLOGY

## 2021-11-05 PROCEDURE — 99214 OFFICE O/P EST MOD 30 MIN: CPT | Mod: PBBFAC,25,TXP | Performed by: NURSE PRACTITIONER

## 2021-11-05 PROCEDURE — 78452 HT MUSCLE IMAGE SPECT MULT: CPT | Mod: 26,TXP,, | Performed by: INTERNAL MEDICINE

## 2021-11-05 PROCEDURE — 99215 OFFICE O/P EST HI 40 MIN: CPT | Mod: S$PBB,TXP,, | Performed by: NURSE PRACTITIONER

## 2021-11-05 PROCEDURE — 93018 STRESS TEST WITH MYOCARDIAL PERFUSION (CUPID ONLY): ICD-10-PCS | Mod: TXP,,, | Performed by: INTERNAL MEDICINE

## 2021-11-05 PROCEDURE — 72170 X-RAY EXAM OF PELVIS: CPT | Mod: 26,TXP,, | Performed by: RADIOLOGY

## 2021-11-05 RX ORDER — REGADENOSON 0.08 MG/ML
0.4 INJECTION, SOLUTION INTRAVENOUS ONCE
Status: COMPLETED | OUTPATIENT
Start: 2021-11-05 | End: 2021-11-05

## 2021-11-05 RX ADMIN — REGADENOSON 0.4 MG: 0.08 INJECTION, SOLUTION INTRAVENOUS at 08:11

## 2021-11-09 ENCOUNTER — TELEPHONE (OUTPATIENT)
Dept: TRANSPLANT | Facility: CLINIC | Age: 56
End: 2021-11-09
Payer: MEDICARE

## 2021-11-09 DIAGNOSIS — Z76.82 PATIENT ON WAITING LIST FOR KIDNEY TRANSPLANT: Primary | ICD-10-CM

## 2021-11-11 ENCOUNTER — HOSPITAL ENCOUNTER (OUTPATIENT)
Dept: RADIOLOGY | Facility: HOSPITAL | Age: 56
Discharge: HOME | End: 2021-11-11
Attending: TRANSPLANT SURGERY
Payer: MEDICARE

## 2021-11-11 DIAGNOSIS — Z76.82 PATIENT ON WAITING LIST FOR KIDNEY TRANSPLANT: ICD-10-CM

## 2021-11-11 PROCEDURE — 74176 CT ABD & PELVIS W/O CONTRAST: CPT | Mod: TC,TXP

## 2021-11-11 PROCEDURE — 74176 CT ABD & PELVIS W/O CONTRAST: CPT | Mod: 26,TXP,, | Performed by: RADIOLOGY

## 2021-11-11 PROCEDURE — 74176 CT ABDOMEN PELVIS WITHOUT CONTRAST: ICD-10-PCS | Mod: 26,TXP,, | Performed by: RADIOLOGY

## 2021-11-17 LAB — HPRA INTERPRETATION: NORMAL

## 2021-12-17 ENCOUNTER — LAB VISIT (OUTPATIENT)
Dept: LAB | Facility: HOSPITAL | Age: 56
End: 2021-12-17
Payer: COMMERCIAL

## 2021-12-17 DIAGNOSIS — Z76.82 ORGAN TRANSPLANT CANDIDATE: ICD-10-CM

## 2022-01-04 PROCEDURE — 99001 SPECIMEN HANDLING PT-LAB: CPT | Mod: PO,TXP | Performed by: NURSE PRACTITIONER

## 2022-01-11 ENCOUNTER — LAB VISIT (OUTPATIENT)
Dept: LAB | Facility: HOSPITAL | Age: 57
End: 2022-01-11
Payer: COMMERCIAL

## 2022-01-11 DIAGNOSIS — Z76.82 ORGAN TRANSPLANT CANDIDATE: ICD-10-CM

## 2022-01-20 LAB — HPRA INTERPRETATION: NORMAL

## 2022-01-20 PROCEDURE — 86832 HLA CLASS I HIGH DEFIN QUAL: CPT | Mod: PO,TXP | Performed by: NURSE PRACTITIONER

## 2022-01-20 PROCEDURE — 86833 HLA CLASS II HIGH DEFIN QUAL: CPT | Mod: PO,TXP | Performed by: NURSE PRACTITIONER

## 2022-02-01 PROCEDURE — 86833 HLA CLASS II HIGH DEFIN QUAL: CPT | Mod: PO,TXP | Performed by: NURSE PRACTITIONER

## 2022-02-01 PROCEDURE — 86832 HLA CLASS I HIGH DEFIN QUAL: CPT | Mod: PO,TXP | Performed by: NURSE PRACTITIONER

## 2022-02-01 PROCEDURE — 86977 RBC SERUM PRETX INCUBJ/INHIB: CPT | Mod: PO,TXP | Performed by: NURSE PRACTITIONER

## 2022-02-03 PROCEDURE — 86832 HLA CLASS I HIGH DEFIN QUAL: CPT | Mod: PO,TXP | Performed by: NURSE PRACTITIONER

## 2022-02-04 PROCEDURE — 86833 HLA CLASS II HIGH DEFIN QUAL: CPT | Mod: PO,TXP | Performed by: NURSE PRACTITIONER

## 2022-02-04 PROCEDURE — 86977 RBC SERUM PRETX INCUBJ/INHIB: CPT | Mod: 59,PO,TXP | Performed by: NURSE PRACTITIONER

## 2022-02-04 PROCEDURE — 86832 HLA CLASS I HIGH DEFIN QUAL: CPT | Mod: PO,TXP | Performed by: NURSE PRACTITIONER

## 2022-02-09 ENCOUNTER — LAB VISIT (OUTPATIENT)
Dept: LAB | Facility: HOSPITAL | Age: 57
End: 2022-02-09
Payer: COMMERCIAL

## 2022-02-09 DIAGNOSIS — Z76.82 ORGAN TRANSPLANT CANDIDATE: ICD-10-CM

## 2022-02-18 LAB
CLASS I ANTIBODIES - FLOW: NORMAL
CLASS I ANTIBODY COMMENTS - LUMINEX: NORMAL
CLASS II ANTIBODY COMMENTS - LUMINEX: NORMAL
CPRA %: 0
CPRA %: 0
LUMINEX ADSORBED SERUM CLASS I & II SPECIFICITY INTERPRETATION: NORMAL
SERUM COLLECTION DT - LUMINEX CLASS I: NORMAL
SERUM COLLECTION DT - LUMINEX CLASS II: NORMAL
SERUM COLLECTION DT: NORMAL
SLAA1 TESTING DATE: NORMAL
SLAA2 TESTING DATE: NORMAL
SPCF1 TESTING DATE: NORMAL
SPLAA TESTING DATE: NORMAL

## 2022-02-21 LAB
CLASS I ANTIBODY COMMENTS - LUMINEX: NORMAL
CLASS II ANTIBODY COMMENTS - LUMINEX: NORMAL
SERUM COLLECTION DT - LUMINEX CLASS I: NORMAL
SERUM COLLECTION DT - LUMINEX CLASS II: NORMAL
SPCL1 TESTING DATE: NORMAL
SPCL2 TESTING DATE: NORMAL
SPCLU TESTING DATE: NORMAL

## 2022-03-04 LAB — HPRA INTERPRETATION: NORMAL

## 2022-03-08 PROCEDURE — 99001 SPECIMEN HANDLING PT-LAB: CPT | Mod: PO,TXP | Performed by: NURSE PRACTITIONER

## 2022-03-15 ENCOUNTER — LAB VISIT (OUTPATIENT)
Dept: LAB | Facility: HOSPITAL | Age: 57
End: 2022-03-15
Payer: COMMERCIAL

## 2022-03-15 DIAGNOSIS — Z76.82 ORGAN TRANSPLANT CANDIDATE: ICD-10-CM

## 2022-03-17 LAB
CLASS I ANTIBODY COMMENTS - LUMINEX: NORMAL
CLASS II ANTIBODY COMMENTS - LUMINEX: NORMAL
CPRA %: 0
SERUM COLLECTION DT - LUMINEX CLASS I: NORMAL
SERUM COLLECTION DT - LUMINEX CLASS II: NORMAL
SLAA1 TESTING DATE: NORMAL
SLAA2 TESTING DATE: NORMAL
SPLAA TESTING DATE: NORMAL

## 2022-04-01 PROCEDURE — 90966 ESRD HOME PT SERV P MO 20+: CPT | Mod: ,,, | Performed by: INTERNAL MEDICINE

## 2022-04-01 PROCEDURE — 90966 PR ESRD SERVICES, HOME DIALYSIS, PER MONTH, 20+ YR OLD: ICD-10-PCS | Mod: ,,, | Performed by: INTERNAL MEDICINE

## 2022-04-08 PROCEDURE — 86832 HLA CLASS I HIGH DEFIN QUAL: CPT | Mod: PO,TXP | Performed by: NURSE PRACTITIONER

## 2022-04-08 PROCEDURE — 86833 HLA CLASS II HIGH DEFIN QUAL: CPT | Mod: PO,TXP | Performed by: NURSE PRACTITIONER

## 2022-04-08 PROCEDURE — 86977 RBC SERUM PRETX INCUBJ/INHIB: CPT | Mod: 59,PO,TXP | Performed by: NURSE PRACTITIONER

## 2022-04-12 ENCOUNTER — LAB VISIT (OUTPATIENT)
Dept: LAB | Facility: HOSPITAL | Age: 57
End: 2022-04-12
Payer: COMMERCIAL

## 2022-04-12 DIAGNOSIS — Z76.82 ORGAN TRANSPLANT CANDIDATE: ICD-10-CM

## 2022-04-18 ENCOUNTER — OUTSIDE PLACE OF SERVICE (OUTPATIENT)
Dept: NEPHROLOGY | Facility: CLINIC | Age: 57
End: 2022-04-18
Payer: MEDICARE

## 2022-05-01 ENCOUNTER — OUTSIDE PLACE OF SERVICE (OUTPATIENT)
Dept: NEPHROLOGY | Facility: CLINIC | Age: 57
End: 2022-05-01
Payer: MEDICARE

## 2022-05-01 PROCEDURE — 90966 PR ESRD SERVICES, HOME DIALYSIS, PER MONTH, 20+ YR OLD: ICD-10-PCS | Mod: ,,, | Performed by: INTERNAL MEDICINE

## 2022-05-01 PROCEDURE — 90966 ESRD HOME PT SERV P MO 20+: CPT | Mod: ,,, | Performed by: INTERNAL MEDICINE

## 2022-05-01 PROCEDURE — 99001 SPECIMEN HANDLING PT-LAB: CPT | Mod: PO,TXP | Performed by: NURSE PRACTITIONER

## 2022-05-04 ENCOUNTER — LAB VISIT (OUTPATIENT)
Dept: LAB | Facility: HOSPITAL | Age: 57
End: 2022-05-04
Payer: COMMERCIAL

## 2022-05-04 DIAGNOSIS — Z76.82 ORGAN TRANSPLANT CANDIDATE: ICD-10-CM

## 2022-05-04 LAB — HPRA INTERPRETATION: NORMAL

## 2022-05-04 PROCEDURE — 99001 SPECIMEN HANDLING PT-LAB: CPT | Mod: PO,TXP | Performed by: NURSE PRACTITIONER

## 2022-05-17 PROCEDURE — 86832 HLA CLASS I HIGH DEFIN QUAL: CPT | Mod: PO,TXP | Performed by: NURSE PRACTITIONER

## 2022-06-01 ENCOUNTER — OUTSIDE PLACE OF SERVICE (OUTPATIENT)
Dept: NEPHROLOGY | Facility: CLINIC | Age: 57
End: 2022-06-01
Payer: MEDICARE

## 2022-06-01 PROCEDURE — 90966 ESRD HOME PT SERV P MO 20+: CPT | Mod: NTX,,, | Performed by: INTERNAL MEDICINE

## 2022-06-01 PROCEDURE — 90966 PR ESRD SERVICES, HOME DIALYSIS, PER MONTH, 20+ YR OLD: ICD-10-PCS | Mod: NTX,,, | Performed by: INTERNAL MEDICINE

## 2022-06-09 PROCEDURE — 86977 RBC SERUM PRETX INCUBJ/INHIB: CPT | Mod: PO,TXP | Performed by: NURSE PRACTITIONER

## 2022-06-09 PROCEDURE — 86832 HLA CLASS I HIGH DEFIN QUAL: CPT | Mod: PO,TXP | Performed by: NURSE PRACTITIONER

## 2022-06-09 PROCEDURE — 86833 HLA CLASS II HIGH DEFIN QUAL: CPT | Mod: PO,TXP | Performed by: NURSE PRACTITIONER

## 2022-06-09 PROCEDURE — 86832 HLA CLASS I HIGH DEFIN QUAL: CPT | Mod: 59,PO,TXP | Performed by: NURSE PRACTITIONER

## 2022-06-14 ENCOUNTER — LAB VISIT (OUTPATIENT)
Dept: LAB | Facility: HOSPITAL | Age: 57
End: 2022-06-14
Payer: MEDICARE

## 2022-06-14 DIAGNOSIS — Z76.82 ORGAN TRANSPLANT CANDIDATE: ICD-10-CM

## 2022-06-28 LAB — HPRA INTERPRETATION: NORMAL

## 2022-07-01 ENCOUNTER — OUTSIDE PLACE OF SERVICE (OUTPATIENT)
Dept: NEPHROLOGY | Facility: CLINIC | Age: 57
End: 2022-07-01
Payer: MEDICARE

## 2022-07-01 PROCEDURE — 90966 PR ESRD SERVICES, HOME DIALYSIS, PER MONTH, 20+ YR OLD: ICD-10-PCS | Mod: NTX,,, | Performed by: INTERNAL MEDICINE

## 2022-07-01 PROCEDURE — 90966 ESRD HOME PT SERV P MO 20+: CPT | Mod: NTX,,, | Performed by: INTERNAL MEDICINE

## 2022-07-07 PROCEDURE — 99001 SPECIMEN HANDLING PT-LAB: CPT | Mod: PO,TXP | Performed by: NURSE PRACTITIONER

## 2022-07-12 ENCOUNTER — LAB VISIT (OUTPATIENT)
Dept: LAB | Facility: HOSPITAL | Age: 57
End: 2022-07-12
Payer: MEDICARE

## 2022-07-12 DIAGNOSIS — Z76.82 ORGAN TRANSPLANT CANDIDATE: ICD-10-CM

## 2022-07-26 DIAGNOSIS — Z76.82 ORGAN TRANSPLANT CANDIDATE: Primary | ICD-10-CM

## 2022-07-26 NOTE — PROGRESS NOTES
YEARLY LIST MANAGEMENT NOTE    Spikemarjan Estrella's kidney transplant listing status reviewed.  Patient is due for follow-up appointments in October 2022.  Appointments will be scheduled per protocol.  HLA sample is current and being rec'd on a regular basis.

## 2022-07-28 DIAGNOSIS — Z76.82 ORGAN TRANSPLANT CANDIDATE: Primary | ICD-10-CM

## 2022-08-01 ENCOUNTER — OUTSIDE PLACE OF SERVICE (OUTPATIENT)
Dept: NEPHROLOGY | Facility: CLINIC | Age: 57
End: 2022-08-01
Payer: MEDICARE

## 2022-08-01 PROCEDURE — 90966 PR ESRD SERVICES, HOME DIALYSIS, PER MONTH, 20+ YR OLD: ICD-10-PCS | Mod: ,,, | Performed by: INTERNAL MEDICINE

## 2022-08-01 PROCEDURE — 90966 ESRD HOME PT SERV P MO 20+: CPT | Mod: ,,, | Performed by: INTERNAL MEDICINE

## 2022-08-08 ENCOUNTER — TELEPHONE (OUTPATIENT)
Dept: TRANSPLANT | Facility: CLINIC | Age: 57
End: 2022-08-08
Payer: MEDICARE

## 2022-09-01 ENCOUNTER — OUTSIDE PLACE OF SERVICE (OUTPATIENT)
Dept: NEPHROLOGY | Facility: CLINIC | Age: 57
End: 2022-09-01
Payer: MEDICARE

## 2022-09-01 PROCEDURE — 90966 ESRD HOME PT SERV P MO 20+: CPT | Mod: ,,, | Performed by: INTERNAL MEDICINE

## 2022-09-01 PROCEDURE — 90966 PR ESRD SERVICES, HOME DIALYSIS, PER MONTH, 20+ YR OLD: ICD-10-PCS | Mod: ,,, | Performed by: INTERNAL MEDICINE

## 2022-10-11 ENCOUNTER — TELEPHONE (OUTPATIENT)
Dept: TRANSPLANT | Facility: CLINIC | Age: 57
End: 2022-10-11
Payer: MEDICARE

## 2022-10-14 ENCOUNTER — OFFICE VISIT (OUTPATIENT)
Dept: FAMILY MEDICINE | Facility: CLINIC | Age: 57
End: 2022-10-14
Payer: MEDICARE

## 2022-10-14 VITALS
TEMPERATURE: 98 F | BODY MASS INDEX: 26.77 KG/M2 | WEIGHT: 202 LBS | DIASTOLIC BLOOD PRESSURE: 80 MMHG | HEIGHT: 73 IN | SYSTOLIC BLOOD PRESSURE: 136 MMHG | RESPIRATION RATE: 16 BRPM | HEART RATE: 85 BPM | OXYGEN SATURATION: 98 %

## 2022-10-14 DIAGNOSIS — Z99.2 ESRD ON HEMODIALYSIS: Primary | ICD-10-CM

## 2022-10-14 DIAGNOSIS — Z95.2 HX OF AORTIC VALVE REPLACEMENT: ICD-10-CM

## 2022-10-14 DIAGNOSIS — N18.6 ESRD ON HEMODIALYSIS: Primary | ICD-10-CM

## 2022-10-14 DIAGNOSIS — E78.00 PURE HYPERCHOLESTEROLEMIA: ICD-10-CM

## 2022-10-14 DIAGNOSIS — M32.14 LUPUS NEPHRITIS: ICD-10-CM

## 2022-10-14 DIAGNOSIS — J01.10 ACUTE NON-RECURRENT FRONTAL SINUSITIS: ICD-10-CM

## 2022-10-14 PROCEDURE — 99204 OFFICE O/P NEW MOD 45 MIN: CPT | Mod: NTX,S$GLB,, | Performed by: FAMILY MEDICINE

## 2022-10-14 PROCEDURE — G0008 ADMIN INFLUENZA VIRUS VAC: HCPCS | Mod: NTX,S$GLB,, | Performed by: FAMILY MEDICINE

## 2022-10-14 PROCEDURE — 99204 PR OFFICE/OUTPT VISIT, NEW, LEVL IV, 45-59 MIN: ICD-10-PCS | Mod: NTX,S$GLB,, | Performed by: FAMILY MEDICINE

## 2022-10-14 PROCEDURE — G0008 FLU VACCINE (QUAD) GREATER THAN OR EQUAL TO 3YO PRESERVATIVE FREE IM: ICD-10-PCS | Mod: NTX,S$GLB,, | Performed by: FAMILY MEDICINE

## 2022-10-14 PROCEDURE — 90686 IIV4 VACC NO PRSV 0.5 ML IM: CPT | Mod: NTX,S$GLB,, | Performed by: FAMILY MEDICINE

## 2022-10-14 PROCEDURE — 90686 FLU VACCINE (QUAD) GREATER THAN OR EQUAL TO 3YO PRESERVATIVE FREE IM: ICD-10-PCS | Mod: NTX,S$GLB,, | Performed by: FAMILY MEDICINE

## 2022-10-14 RX ORDER — IRON SUCROSE 20 MG/ML
200 INJECTION, SOLUTION INTRAVENOUS
COMMUNITY

## 2022-10-14 RX ORDER — ATORVASTATIN CALCIUM 10 MG/1
10 TABLET, FILM COATED ORAL DAILY
COMMUNITY
Start: 2022-10-05

## 2022-10-14 RX ORDER — CETIRIZINE HYDROCHLORIDE 10 MG/1
10 TABLET ORAL
COMMUNITY
End: 2022-10-14

## 2022-10-14 RX ORDER — AMOXICILLIN AND CLAVULANATE POTASSIUM 500; 125 MG/1; MG/1
1 TABLET, FILM COATED ORAL 2 TIMES DAILY
Qty: 14 TABLET | Refills: 0 | Status: SHIPPED | OUTPATIENT
Start: 2022-10-14 | End: 2022-10-21

## 2022-10-14 RX ORDER — ESOMEPRAZOLE MAGNESIUM 40 MG/1
40 CAPSULE, DELAYED RELEASE ORAL
COMMUNITY
Start: 2022-03-03 | End: 2024-01-22

## 2022-10-14 RX ORDER — ASCORBIC ACID 500 MG
500 TABLET ORAL
COMMUNITY

## 2022-10-14 RX ORDER — IPRATROPIUM BROMIDE 42 UG/1
2 SPRAY, METERED NASAL 4 TIMES DAILY
Qty: 15 ML | Refills: 6 | Status: SHIPPED | OUTPATIENT
Start: 2022-10-14 | End: 2022-11-13

## 2022-10-14 NOTE — PROGRESS NOTES
"  Family Medicine Note  [facility]    Chief Complaint   Patient presents with    Establish Care    Sinus Problem    quinn        HPI:  57 male new patient.  Actively seen by renal and transplant team given ESRD 2/2 lupus nephritis.    On home hemodialysis.    Blood Pressure at goal   Distant history of aortic valve replacement  Hyperlipidemia stable on appropriate intensity statin therapy    Actively sees rheumatology in [facility] - Dr. Roach    Acutely today, reporting notable sinus issues - 2-3 weeks of purulent rhinorrhea.  On flonase      ROS: no acute issues    Vitals:    10/14/22 0838   BP: 136/80   BP Location: Right leg   Patient Position: Sitting   BP Method: Medium (Manual)   Pulse: 85   Resp: 16   Temp: 97.7 °F (36.5 °C)   TempSrc: Axillary   SpO2: 98%   Weight: 91.6 kg (202 lb)   Height: 6' 1" (1.854 m)      Body mass index is 26.65 kg/m².      General:  AOx3, well nourished and developed in no acute distress  Eyes:  PERRLA, EOMI, vision intact grossly  ENT:  normal hearing, moist oral mucosa  Neck:  trachea midline with no masses or thyromegaly  Heart:  RRR, IV/VI murmur with loud S2.  No edema noted, extremities warm and well perfused  Lungs:  clear to auscultation bilaterally with symmetric chest movement  Abdomen:  Soft, nontender, nondistended.  Normal bowel sounds  Musculoskeletal:  Normal gait.  Normal posture.  Normal muscular development with no joint swelling.  Neurological:  CN II-XII grossly intact. Symmetric strength and sensation  Psych:  Normal mood and affect.  Able to demonstrate good judgement and personal insight.      Assessment/Plan:    ESRD on hemodialysis    Lupus nephritis    Pure hypercholesterolemia    Hx of aortic valve replacement    Acute non-recurrent frontal sinusitis      Stable, continue therapy  Stable with nephrology  Stable on statin  Stable - has been told of no need for blood thinner  Treat with renally dosed augmentin today      "

## 2022-10-20 NOTE — LETTER
September 19, 2019        Oanh López  [address] [facility] [place] [address]  Phone: [phone]  Fax: [phone]             [facility]  [address]  [facility] [address]  Phone: [phone]   Patient: Spike Estrella   MR Number: [id number]   YOB: 1965   Date of Visit: 9/17/2019       Dear Dr. Oanh López    Thank you for referring Spike Estrella to me for evaluation. Attached you will find relevant portions of my assessment and plan of care.    If you have questions, please do not hesitate to call me. I look forward to following Spike Estrella along with you.    Sincerely,    Jesenia White, NP    Enclosure    If you would like to receive this communication electronically, please contact [email or web address] or [phone] to request [employer] access.    [employer] is a tool which provides read-only access to select patient information with whom you have a relationship. Its easy to use and provides real time access to review your patients record including encounter summaries, notes, results, and demographic information.    If you feel you have received this communication in error or would no longer like to receive these types of communications, please e-mail [email or web address]        9

## 2022-10-21 ENCOUNTER — HOSPITAL ENCOUNTER (OUTPATIENT)
Dept: RADIOLOGY | Facility: HOSPITAL | Age: 57
Discharge: HOME | End: 2022-10-21
Attending: NURSE PRACTITIONER
Payer: MEDICARE

## 2022-10-21 ENCOUNTER — HOSPITAL ENCOUNTER (OUTPATIENT)
Dept: CARDIOLOGY | Facility: HOSPITAL | Age: 57
Discharge: HOME | End: 2022-10-21
Attending: NURSE PRACTITIONER
Payer: MEDICARE

## 2022-10-21 ENCOUNTER — OFFICE VISIT (OUTPATIENT)
Dept: TRANSPLANT | Facility: CLINIC | Age: 57
End: 2022-10-21
Payer: MEDICARE

## 2022-10-21 VITALS
TEMPERATURE: 97 F | HEART RATE: 77 BPM | DIASTOLIC BLOOD PRESSURE: 79 MMHG | OXYGEN SATURATION: 97 % | RESPIRATION RATE: 18 BRPM | SYSTOLIC BLOOD PRESSURE: 174 MMHG | BODY MASS INDEX: 27.41 KG/M2 | HEIGHT: 73 IN | WEIGHT: 206.81 LBS

## 2022-10-21 VITALS
HEART RATE: 70 BPM | SYSTOLIC BLOOD PRESSURE: 136 MMHG | DIASTOLIC BLOOD PRESSURE: 80 MMHG | HEIGHT: 73 IN | WEIGHT: 202 LBS | BODY MASS INDEX: 26.77 KG/M2

## 2022-10-21 DIAGNOSIS — Z99.2 ESRD ON HEMODIALYSIS: ICD-10-CM

## 2022-10-21 DIAGNOSIS — M32.14 LUPUS NEPHRITIS: ICD-10-CM

## 2022-10-21 DIAGNOSIS — Z76.82 ORGAN TRANSPLANT CANDIDATE: ICD-10-CM

## 2022-10-21 DIAGNOSIS — I10 ESSENTIAL HYPERTENSION: Chronic | ICD-10-CM

## 2022-10-21 DIAGNOSIS — Z95.2 HX OF AORTIC VALVE REPLACEMENT: ICD-10-CM

## 2022-10-21 DIAGNOSIS — Z76.82 PATIENT ON WAITING LIST FOR KIDNEY TRANSPLANT: Primary | Chronic | ICD-10-CM

## 2022-10-21 DIAGNOSIS — N18.6 ESRD ON HEMODIALYSIS: ICD-10-CM

## 2022-10-21 LAB
ASCENDING AORTA: 3.02 CM
AV INDEX (PROSTH): 0.53
AV MEAN GRADIENT: 27 MMHG
AV PEAK GRADIENT: 54 MMHG
AV VALVE AREA: 1.93 CM2
AV VELOCITY RATIO: 0.44
BSA FOR ECHO PROCEDURE: 2.17 M2
CV ECHO LV RWT: 0.38 CM
DOP CALC AO PEAK VEL: 3.66 M/S
DOP CALC AO VTI: 70.34 CM
DOP CALC LVOT AREA: 3.7 CM2
DOP CALC LVOT DIAMETER: 2.16 CM
DOP CALC LVOT PEAK VEL: 1.61 M/S
DOP CALC LVOT STROKE VOLUME: 135.84 CM3
DOP CALCLVOT PEAK VEL VTI: 37.09 CM
E WAVE DECELERATION TIME: 151.15 MSEC
E/A RATIO: 1.08
E/E' RATIO: 24.29 M/S
ECHO LV POSTERIOR WALL: 1.04 CM (ref 0.6–1.1)
EJECTION FRACTION: 65 %
FRACTIONAL SHORTENING: 41 % (ref 28–44)
INTERVENTRICULAR SEPTUM: 0.88 CM (ref 0.6–1.1)
IVRT: 62.8 MSEC
LA MAJOR: 6.14 CM
LA MINOR: 6.14 CM
LA WIDTH: 4.45 CM
LEFT ATRIUM SIZE: 4.87 CM
LEFT ATRIUM VOLUME INDEX MOD: 38.4 ML/M2
LEFT ATRIUM VOLUME INDEX: 52.4 ML/M2
LEFT ATRIUM VOLUME MOD: 82.92 CM3
LEFT ATRIUM VOLUME: 113.1 CM3
LEFT INTERNAL DIMENSION IN SYSTOLE: 3.19 CM (ref 2.1–4)
LEFT VENTRICLE DIASTOLIC VOLUME INDEX: 66.29 ML/M2
LEFT VENTRICLE DIASTOLIC VOLUME: 143.18 ML
LEFT VENTRICLE MASS INDEX: 92 G/M2
LEFT VENTRICLE SYSTOLIC VOLUME INDEX: 18.8 ML/M2
LEFT VENTRICLE SYSTOLIC VOLUME: 40.5 ML
LEFT VENTRICULAR INTERNAL DIMENSION IN DIASTOLE: 5.43 CM (ref 3.5–6)
LEFT VENTRICULAR MASS: 197.75 G
LV LATERAL E/E' RATIO: 21.25 M/S
LV SEPTAL E/E' RATIO: 28.33 M/S
MV A" WAVE DURATION": 11.7 MSEC
MV PEAK A VEL: 1.58 M/S
MV PEAK E VEL: 1.7 M/S
MV STENOSIS PRESSURE HALF TIME: 43.83 MS
MV VALVE AREA P 1/2 METHOD: 5.02 CM2
PISA TR MAX VEL: 3.55 M/S
PULM VEIN S/D RATIO: 1.52
PV PEAK D VEL: 0.44 M/S
PV PEAK S VEL: 0.67 M/S
RA MAJOR: 5.41 CM
RA PRESSURE: 3 MMHG
RA WIDTH: 4.51 CM
RIGHT VENTRICULAR END-DIASTOLIC DIMENSION: 3.7 CM
RV TISSUE DOPPLER FREE WALL SYSTOLIC VELOCITY 1 (APICAL 4 CHAMBER VIEW): 12.73 CM/S
SINUS: 2.58 CM
STJ: 2.95 CM
TDI LATERAL: 0.08 M/S
TDI SEPTAL: 0.06 M/S
TDI: 0.07 M/S
TR MAX PG: 50 MMHG
TRICUSPID ANNULAR PLANE SYSTOLIC EXCURSION: 2.04 CM
TV REST PULMONARY ARTERY PRESSURE: 53 MMHG

## 2022-10-21 PROCEDURE — 99215 PR OFFICE/OUTPT VISIT, EST, LEVL V, 40-54 MIN: ICD-10-PCS | Mod: S$PBB,TXP,, | Performed by: NURSE PRACTITIONER

## 2022-10-21 PROCEDURE — 71046 XR CHEST PA AND LATERAL: ICD-10-PCS | Mod: 26,TXP,, | Performed by: RADIOLOGY

## 2022-10-21 PROCEDURE — 99999 PR PBB SHADOW E&M-EST. PATIENT-LVL V: ICD-10-PCS | Mod: PBBFAC,TXP,, | Performed by: NURSE PRACTITIONER

## 2022-10-21 PROCEDURE — 99215 OFFICE O/P EST HI 40 MIN: CPT | Mod: S$PBB,TXP,, | Performed by: NURSE PRACTITIONER

## 2022-10-21 PROCEDURE — 93306 TTE W/DOPPLER COMPLETE: CPT | Mod: 26,TXP,, | Performed by: INTERNAL MEDICINE

## 2022-10-21 PROCEDURE — 71046 X-RAY EXAM CHEST 2 VIEWS: CPT | Mod: 26,TXP,, | Performed by: RADIOLOGY

## 2022-10-21 PROCEDURE — 93306 ECHO (CUPID ONLY): ICD-10-PCS | Mod: 26,TXP,, | Performed by: INTERNAL MEDICINE

## 2022-10-21 PROCEDURE — 99999 PR PBB SHADOW E&M-EST. PATIENT-LVL V: CPT | Mod: PBBFAC,TXP,, | Performed by: NURSE PRACTITIONER

## 2022-10-21 PROCEDURE — 71046 X-RAY EXAM CHEST 2 VIEWS: CPT | Mod: TC,FY,TXP

## 2022-10-21 PROCEDURE — 93306 TTE W/DOPPLER COMPLETE: CPT | Mod: TXP

## 2022-10-21 PROCEDURE — 99215 OFFICE O/P EST HI 40 MIN: CPT | Mod: PBBFAC,25,TXP | Performed by: NURSE PRACTITIONER

## 2022-10-21 NOTE — PROGRESS NOTES
Kidney Transplant Recipient Reevalulation    Referring Physician: Oanh López  Current Nephrologist: Oanh López  Waitlist Status: active  Dialysis Start Date: 7/3/2020    Subjective:     CC:  Annual reassessment of kidney transplant candidacy.    HPI:  Mr. Estrella is a 57 y.o. year old White male with ESRD secondary to lupus nephritis.  He has been on the wait list for a kidney transplant at [facility] since 10/13/2017. Patient is currently on hemodialysis started on 10/29/2019. Patient is dialyzing on home HD 4 days/week, 3 hour sessions.  Patient reports that he is tolerating dialysis well.. He has a LUE AV graft. Patient denies any recent hospitalizations or ED visits.    Lupus-follows with rheumatologist Dr. Sebastian Frost, remains on plaquenil and MMF    Remains active. Able to climb flight of stairs without CP or SOB. Looks good, not frail.    CXR 10/21/2022: Status post aortic valve repair.  No acute cardiopulmonary disease.  CT abd/pelvis 11/11/2021: favorable for transplant.    Renal US 11/5/2021: Sonographic characteristics of chronic kidney disease. Bilateral nonobstructing nephrolithiasis.  Iliacs 9/18/2019: favorable for transplant  Echo 10/21/2022: EF 65%, PA 53  Stress 11/5/2021: no evidence of myocardial ischemia or infarction.   Colonoscopy 7/29/2020: internal hemorrhoids, repeat in 7-10 years     Current Outpatient Medications   Medication Sig Dispense Refill    amoxicillin-clavulanate 500-125mg (AUGMENTIN) 500-125 mg Tab Take 1 tablet (500 mg total) by mouth 2 (two) times daily. for 7 days 14 tablet 0    ascorbic acid, vitamin C, (VITAMIN C) 500 MG tablet Take 500 mg by mouth.      aspirin (ECOTRIN) 81 MG EC tablet Take 81 mg by mouth once daily.      atorvastatin (LIPITOR) 10 MG tablet Take 10 mg by mouth once daily.      esomeprazole (NEXIUM) 40 MG capsule 40 mg.      ferrous sulfate 300 mg (60 mg iron)/5 mL syrup Take 300 mg by mouth once daily.      folic acid/vit B complex and C (NEPHRO-ADAMARIS  ORAL) Take by mouth.      hydroxychloroquine (PLAQUENIL) 200 mg tablet Take 400 mg by mouth once daily.   11    ipratropium (ATROVENT) 42 mcg (0.06 %) nasal spray 2 sprays by Nasal route 4 (four) times daily. 15 mL 6    iron polysaccharide complex, vit c-sa (FERREX 150 PLUS) 150-50-50 mg Cap capsule Take 1 capsule by mouth.      iron sucrose (VENOFER) 200 mg iron/10 mL Soln injection Inject 200 mg into the vein.      mycophenolate (CELLCEPT) 500 mg Tab 1,000 mg 2 (two) times daily.   3    SEVELAMER CARBONATE ORAL Take 800 mg by mouth 3 (three) times daily with meals.      vitamin D (VITAMIN D3) 1000 units Tab Take 1,000 Units by mouth once daily.       No current facility-administered medications for this visit.       Past Medical History:   Diagnosis Date    Anemia of renal disease     Essential hypertension     Hx of aortic valve replacement     Immunosuppressed status     Lupus nephritis     Metabolic acidosis     Secondary hyperparathyroidism of renal origin     Stage 4 chronic kidney disease     Stenosis and insufficiency of lacrimal passages     Systemic lupus erythematosus        Review of Systems   Constitutional:  Negative for activity change, appetite change and fever.   HENT:  Negative for congestion, mouth sores and sore throat.    Eyes:  Negative for visual disturbance.   Respiratory:  Negative for cough, chest tightness and shortness of breath.    Cardiovascular:  Negative for chest pain, palpitations and leg swelling.   Gastrointestinal:  Negative for abdominal distention, abdominal pain, constipation, diarrhea and nausea.   Genitourinary:  Positive for decreased urine volume. Negative for difficulty urinating, frequency and hematuria.        Voiding 1200 cc/24 hours   Musculoskeletal:  Negative for arthralgias and gait problem.   Skin:  Negative for wound.   Allergic/Immunologic: Positive for immunocompromised state. Negative for environmental allergies and food allergies.   Neurological:  Negative  "for dizziness, weakness and numbness.   Psychiatric/Behavioral:  Negative for sleep disturbance. The patient is not nervous/anxious.      Objective:   body mass index is 27.14 kg/m².  BP (!) 174/79 (BP Location: Right arm, Patient Position: Sitting, BP Method: Medium (Automatic))   Pulse 77   Temp 97.3 °F (36.3 °C) (Temporal)   Resp 18   Ht 6' 1.19" (1.859 m)   Wt 93.8 kg (206 lb 12.7 oz)   SpO2 97%   BMI 27.14 kg/m²     Physical Exam  Vitals and nursing note reviewed.   Constitutional:       Appearance: Normal appearance.   HENT:      Head: Normocephalic.   Cardiovascular:      Rate and Rhythm: Normal rate and regular rhythm.      Heart sounds: Normal heart sounds.   Pulmonary:      Effort: Pulmonary effort is normal.      Breath sounds: Normal breath sounds.   Abdominal:      General: Bowel sounds are normal. There is no distension.      Palpations: Abdomen is soft.      Tenderness: There is no abdominal tenderness.   Musculoskeletal:         General: Normal range of motion.      Comments: LUE AV graft + thrill      Skin:     General: Skin is warm and dry.   Neurological:      General: No focal deficit present.      Mental Status: He is alert.   Psychiatric:         Behavior: Behavior normal.       Labs:  PSA, Screen (ng/mL)   Date Value   10/21/2022 0.48     Lab Results   Component Value Date    WBC 9.3 07/13/2021    HGB 12.3 07/13/2021    HCT 40.3 07/13/2021     07/13/2021    K 4.5 07/13/2021     (H) 07/13/2021    CO2 26 07/13/2021    BUN 58 (H) 07/13/2021    CREATININE 5.56 (H) 07/13/2021    EGFRNONAA 10 (L) 07/13/2021    GLUCOSE 83 07/13/2021    CALCIUM 9.4 07/13/2021    PHOS 4.7 (H) 06/27/2017    MG 2.2 06/19/2014    ALBUMIN 4.0 06/27/2017    AST 33 06/27/2017    ALT 21 06/27/2017    UTPCR 0.72 (H) 06/27/2017    .1 (H) 10/21/2022       Lab Results   Component Value Date    BILIRUBINUA Negative 06/27/2017    PROTEINUA 1+ (A) 06/27/2017    NITRITE neg 07/24/2017    RBCUA 0 07/24/2017 "    WBCUA 0 06/27/2017       Lab Results   Component Value Date    CPRA 0 08/15/2022    XF0HYVA Negative 05/09/2021    CIABCLM WEAK CW9 08/15/2022    CIIAB Negative 05/09/2021    ABCMT WEAK DP1 08/15/2022       Labs were reviewed with the patient.    Pre-transplant Workup:   Reviewed with the patient.    Assessment:     1. Patient on waiting list for kidney transplant    2. ESRD on hemodialysis    3. Lupus nephritis    4. Hx of aortic valve replacement    5. Essential hypertension    6. BMI 27.0-27.9,adult        Plan:   Better fluid removal with dialysis, PA pressure today 53 mmHg      Transplant Candidacy:   Mr. Estrella is a suitable kidney transplant candidate.  Meets center eligibility for accepting HCV+ donor offer - yes.  Patient educated on HCV+ donors. Spike is willing  to accept HCV+ donor offer -  yes   Patient is a candidate for KDPI > 85 kidney donor offer - no (weight > 88kg)  He remains in overall stable health, and will remain active on the transplant list.    Patient advised that it is recommended that all transplant candidates, and their close contacts and household members receive Covid vaccination.    Jailyn Vieyra NP       Follow-up:   In addition to the tests noted in the plan, Mr. Estrella will continue to have reevaluation as per the standing pre-kidney transplant protocol:  Monthly blood for PRA  Annual return to clinic, except HIV positive, > 65 years of age, or pancreas transplant candidates who will be scheduled to see transplant every 6 months while in pre-transplant phase  Annual re-testing: CXR, EKG, yearly mammograms for women over 40 and PSA for males over 40, cardiology follow-up as recommended by initial cardiology pre-transplant evaluation  Renal ultrasound every 2 years  Baseline colonoscopy after age 50 and repeated as recommended    UNOS Patient Status  Functional Status: 60% - Requires occasional assistance but is able to care for needs  Physical Capacity: No Limitations

## 2022-10-21 NOTE — LETTER
October 24, 2022        Oanh López  [address] [place] [address]  Phone: [phone]  Fax: [phone]             [facility] 1st Fl  [address]  [facility] [address]  Phone: [phone]   Patient: Spike Estrella   MR Number: [id number]   YOB: 1965   Date of Visit: 10/21/2022       Dear Dr. Oanh López    Thank you for referring Spike Estrella to me for evaluation. Attached you will find relevant portions of my assessment and plan of care.    If you have questions, please do not hesitate to call me. I look forward to following Spike Estrella along with you.    Sincerely,    Jailyn Vieyra, CHITO    Enclosure    If you would like to receive this communication electronically, please contact [email or web address] or [phone] to request [employer] Link access.    [employer] Link is a tool which provides read-only access to select patient information with whom you have a relationship. Its easy to use and provides real time access to review your patients record including encounter summaries, notes, results, and demographic information.    If you feel you have received this communication in error or would no longer like to receive these types of communications, please e-mail [email or web address]

## 2022-11-01 ENCOUNTER — OUTSIDE PLACE OF SERVICE (OUTPATIENT)
Dept: NEPHROLOGY | Facility: CLINIC | Age: 57
End: 2022-11-01
Payer: MEDICARE

## 2022-11-01 PROCEDURE — 90966 PR ESRD SERVICES, HOME DIALYSIS, PER MONTH, 20+ YR OLD: ICD-10-PCS | Mod: ,,, | Performed by: INTERNAL MEDICINE

## 2022-11-01 PROCEDURE — 90966 ESRD HOME PT SERV P MO 20+: CPT | Mod: ,,, | Performed by: INTERNAL MEDICINE

## 2022-11-04 NOTE — PROGRESS NOTES
Transplant Psychosocial Evaluation Update:  Last psychosocial evaluation for transplant was completed on 11/5/21 by ALLAN Arriaza    Pt presents today in company of wife. Pt and wife  present as alert, oriented to person, place, time, purpose of visit. Pt and wife deny concerns about going through with transplant and state motivation and sense of preparedness for transplantation, caregiver role, psychosocial risk factors, medical risk factors, financial aspects, and lifetime commitments. All concerns and education points as per transplant psychosocial evaluation process addressed (also refer to psychosocial dated 11/5/21). Pt actively participated in today's interview, with wife participating as caregiver. Pt asking questions appropriately and denies changes to previous psychosocial evaluation for transplantation which we reviewed line by line with pt and, per pt choice, with pts caregiver today.    Primary Caregivers and Transportation for Transplant:  1. Sonja Estrella, 58 y/o wife, [phone], drives  2. Jovanni Estrella, brother and sister in law  [phone], [phone]      Both pt and caregiver/s plan to stay locally at lodging arranged by themselves.  Pt and caregiver informed that lodging assistance will be unavailable beginning 2023.  - information reviewed in depth.  - information reviewed in depth. Caregivers plan to stay at hospital as appropriate for transplant and post-transplant process.    Pts Vocation: Pt is disabled. Last day worked:  April 2020.    DME: home hemo equipment and supplies.     ADLS:  Pt states ability to independently accomplish all adls.     Household and Dependents: pt resides with wife and 20 y/o son, Anibal and has one (Anibal) dependents at this time.    Income: Pt states ability to afford all monthly expenses and costs including for medications now and if transplanted based on income and on savings and assets.    Dialysis Adherence: Pt states current and expected  compliance with all healthcare recommendations, including dialysis.    Pt and wife deny any additional concerns, stating preparedness and motivation to proceed with organ transplant.     Suitability for Transplant:   Pt remains low risk for transplant at this time based on psychosocial risk factors and strengths.    Pt chema well overall with health needs and life in general, denies current or past suicidal/homicidal ideation, has stable supports, adequate insurance, financial stability, transportation and caregiver plans in place, and is using no substances unless prescribed.     Payer/Plan Subscr  Sex Relation Sub. Ins. ID Effective Group Num   1. MEDICARE - ME* YAMILET STEVENS 1965 Male Self [id number] 20                                    [address]   2. BLUE CROSS * YAMILET STEVENS 1965 Male Self [id number]                                   [address] [address]

## 2022-11-21 ENCOUNTER — EPISODE CHANGES (OUTPATIENT)
Dept: TRANSPLANT | Facility: CLINIC | Age: 57
End: 2022-11-21

## 2022-11-28 DIAGNOSIS — Z76.82 ORGAN TRANSPLANT CANDIDATE: Primary | ICD-10-CM

## 2022-11-29 ENCOUNTER — HOSPITAL ENCOUNTER (OUTPATIENT)
Dept: CARDIOLOGY | Facility: HOSPITAL | Age: 57
Discharge: HOME | End: 2022-11-29
Attending: NURSE PRACTITIONER
Payer: MEDICARE

## 2022-11-29 VITALS — HEIGHT: 73 IN | BODY MASS INDEX: 27.3 KG/M2 | WEIGHT: 206 LBS

## 2022-11-29 DIAGNOSIS — Z76.82 ORGAN TRANSPLANT CANDIDATE: ICD-10-CM

## 2022-11-29 LAB
AORTIC ROOT ANNULUS: 2.97 CM
AV INDEX (PROSTH): 0.42
AV MEAN GRADIENT: 22 MMHG
AV PEAK GRADIENT: 33 MMHG
AV REGURGITATION PRESSURE HALF TIME: 429.84 MS
AV VALVE AREA: 1.46 CM2
AV VELOCITY RATIO: 0.4
BSA FOR ECHO PROCEDURE: 2.2 M2
CV ECHO LV RWT: 0.5 CM
DOP CALC AO PEAK VEL: 2.89 M/S
DOP CALC AO VTI: 58.7 CM
DOP CALC LVOT AREA: 3.5 CM2
DOP CALC LVOT DIAMETER: 2.11 CM
DOP CALC LVOT PEAK VEL: 1.16 M/S
DOP CALC LVOT STROKE VOLUME: 85.63 CM3
DOP CALCLVOT PEAK VEL VTI: 24.5 CM
E WAVE DECELERATION TIME: 139.65 MSEC
E/A RATIO: 0.87
ECHO LV POSTERIOR WALL: 1.31 CM (ref 0.6–1.1)
EJECTION FRACTION: 65 %
FRACTIONAL SHORTENING: 30 % (ref 28–44)
INTERVENTRICULAR SEPTUM: 1.12 CM (ref 0.6–1.1)
LA MAJOR: 4.99 CM
LA MINOR: 3.64 CM
LEFT ATRIUM SIZE: 4.32 CM
LEFT INTERNAL DIMENSION IN SYSTOLE: 3.62 CM (ref 2.1–4)
LEFT VENTRICLE DIASTOLIC VOLUME INDEX: 59.28 ML/M2
LEFT VENTRICLE DIASTOLIC VOLUME: 129.23 ML
LEFT VENTRICLE MASS INDEX: 116 G/M2
LEFT VENTRICLE SYSTOLIC VOLUME INDEX: 25.3 ML/M2
LEFT VENTRICLE SYSTOLIC VOLUME: 55.09 ML
LEFT VENTRICULAR INTERNAL DIMENSION IN DIASTOLE: 5.2 CM (ref 3.5–6)
LEFT VENTRICULAR MASS: 253.18 G
LV LATERAL E/E' RATIO: 17.33 M/S
LVOT MG: 3.2 MMHG
LVOT MV: 0.85 CM/S
MV PEAK A VEL: 1.19 M/S
MV PEAK E VEL: 1.04 M/S
MV STENOSIS PRESSURE HALF TIME: 40.5 MS
MV VALVE AREA P 1/2 METHOD: 5.43 CM2
PISA AR MAX VEL: 4.64 M/S
PISA MRMAX VEL: 6.22 M/S
PISA TR MAX VEL: 2.96 M/S
PV MV: 0.59 M/S
PV PEAK VELOCITY: 0.81 CM/S
RA MAJOR: 5.13 CM
RA PRESSURE: 3 MMHG
RA WIDTH: 3.28 CM
RIGHT VENTRICULAR END-DIASTOLIC DIMENSION: 3.42 CM
RIGHT VENTRICULAR LENGTH IN DIASTOLE (APICAL 4-CHAMBER VIEW): 5.98 CM
RV MID DIAMA: 27.05 CM
TDI LATERAL: 0.06 M/S
TR MAX PG: 35 MMHG
TV REST PULMONARY ARTERY PRESSURE: 38 MMHG

## 2022-11-29 PROCEDURE — 93356 MYOCRD STRAIN IMG SPCKL TRCK: CPT | Mod: TXP,,, | Performed by: INTERNAL MEDICINE

## 2022-11-29 PROCEDURE — 93306 TTE W/DOPPLER COMPLETE: CPT | Mod: 26,TXP,, | Performed by: INTERNAL MEDICINE

## 2022-11-29 PROCEDURE — 93356 MYOCRD STRAIN IMG SPCKL TRCK: CPT | Mod: TXP

## 2022-11-29 PROCEDURE — 93306 ECHO (CUPID ONLY): ICD-10-PCS | Mod: 26,TXP,, | Performed by: INTERNAL MEDICINE

## 2022-11-29 PROCEDURE — 93356 ECHO (CUPID ONLY): ICD-10-PCS | Mod: TXP,,, | Performed by: INTERNAL MEDICINE

## 2022-12-01 ENCOUNTER — OUTSIDE PLACE OF SERVICE (OUTPATIENT)
Dept: NEPHROLOGY | Facility: CLINIC | Age: 57
End: 2022-12-01
Payer: MEDICARE

## 2022-12-01 PROCEDURE — 90966 PR ESRD SERVICES, HOME DIALYSIS, PER MONTH, 20+ YR OLD: ICD-10-PCS | Mod: ,,, | Performed by: INTERNAL MEDICINE

## 2022-12-01 PROCEDURE — 90966 ESRD HOME PT SERV P MO 20+: CPT | Mod: ,,, | Performed by: INTERNAL MEDICINE

## 2023-01-01 ENCOUNTER — OUTSIDE PLACE OF SERVICE (OUTPATIENT)
Dept: NEPHROLOGY | Facility: CLINIC | Age: 58
End: 2023-01-01
Payer: MEDICARE

## 2023-01-01 PROCEDURE — 90966 ESRD HOME PT SERV P MO 20+: CPT | Mod: ,,, | Performed by: INTERNAL MEDICINE

## 2023-01-01 PROCEDURE — 90966 PR ESRD SERVICES, HOME DIALYSIS, PER MONTH, 20+ YR OLD: ICD-10-PCS | Mod: ,,, | Performed by: INTERNAL MEDICINE

## 2023-01-16 PROBLEM — J01.10 ACUTE NON-RECURRENT FRONTAL SINUSITIS: Status: RESOLVED | Noted: 2022-10-14 | Resolved: 2023-01-16

## 2023-02-01 ENCOUNTER — OUTSIDE PLACE OF SERVICE (OUTPATIENT)
Dept: NEPHROLOGY | Facility: CLINIC | Age: 58
End: 2023-02-01
Payer: MEDICARE

## 2023-02-01 PROCEDURE — 90966 ESRD HOME PT SERV P MO 20+: CPT | Mod: ,,, | Performed by: INTERNAL MEDICINE

## 2023-02-01 PROCEDURE — 90966 PR ESRD SERVICES, HOME DIALYSIS, PER MONTH, 20+ YR OLD: ICD-10-PCS | Mod: ,,, | Performed by: INTERNAL MEDICINE

## 2023-03-01 ENCOUNTER — OUTSIDE PLACE OF SERVICE (OUTPATIENT)
Dept: NEPHROLOGY | Facility: CLINIC | Age: 58
End: 2023-03-01
Payer: MEDICARE

## 2023-03-01 PROCEDURE — 90966 ESRD HOME PT SERV P MO 20+: CPT | Mod: ,,, | Performed by: INTERNAL MEDICINE

## 2023-03-01 PROCEDURE — 90966 PR ESRD SERVICES, HOME DIALYSIS, PER MONTH, 20+ YR OLD: ICD-10-PCS | Mod: ,,, | Performed by: INTERNAL MEDICINE

## 2023-04-01 ENCOUNTER — OUTSIDE PLACE OF SERVICE (OUTPATIENT)
Dept: NEPHROLOGY | Facility: CLINIC | Age: 58
End: 2023-04-01
Payer: MEDICARE

## 2023-04-01 PROCEDURE — 90966 ESRD HOME PT SERV P MO 20+: CPT | Mod: ,,, | Performed by: INTERNAL MEDICINE

## 2023-04-01 PROCEDURE — 90966 PR ESRD SERVICES, HOME DIALYSIS, PER MONTH, 20+ YR OLD: ICD-10-PCS | Mod: ,,, | Performed by: INTERNAL MEDICINE

## 2023-04-14 ENCOUNTER — OFFICE VISIT (OUTPATIENT)
Dept: FAMILY MEDICINE | Facility: CLINIC | Age: 58
End: 2023-04-14
Payer: MEDICARE

## 2023-04-14 VITALS
HEIGHT: 73 IN | WEIGHT: 201 LBS | TEMPERATURE: 98 F | DIASTOLIC BLOOD PRESSURE: 72 MMHG | BODY MASS INDEX: 26.64 KG/M2 | HEART RATE: 78 BPM | SYSTOLIC BLOOD PRESSURE: 118 MMHG | OXYGEN SATURATION: 98 %

## 2023-04-14 DIAGNOSIS — N25.81 SECONDARY HYPERPARATHYROIDISM OF RENAL ORIGIN: ICD-10-CM

## 2023-04-14 DIAGNOSIS — M32.14 LUPUS NEPHRITIS: Primary | ICD-10-CM

## 2023-04-14 DIAGNOSIS — E78.00 PURE HYPERCHOLESTEROLEMIA: ICD-10-CM

## 2023-04-14 DIAGNOSIS — Z99.2 ESRD ON HEMODIALYSIS: ICD-10-CM

## 2023-04-14 DIAGNOSIS — I10 ESSENTIAL HYPERTENSION: Chronic | ICD-10-CM

## 2023-04-14 DIAGNOSIS — Z79.899 DRUG-INDUCED IMMUNODEFICIENCY: ICD-10-CM

## 2023-04-14 DIAGNOSIS — D84.821 DRUG-INDUCED IMMUNODEFICIENCY: ICD-10-CM

## 2023-04-14 DIAGNOSIS — N18.6 ESRD ON HEMODIALYSIS: ICD-10-CM

## 2023-04-14 DIAGNOSIS — M51.36 DEGENERATIVE DISC DISEASE, LUMBAR: ICD-10-CM

## 2023-04-14 PROCEDURE — 99214 OFFICE O/P EST MOD 30 MIN: CPT | Mod: NTX,S$GLB,, | Performed by: FAMILY MEDICINE

## 2023-04-14 PROCEDURE — 99214 PR OFFICE/OUTPT VISIT, EST, LEVL IV, 30-39 MIN: ICD-10-PCS | Mod: NTX,S$GLB,, | Performed by: FAMILY MEDICINE

## 2023-04-14 RX ORDER — IPRATROPIUM BROMIDE 42 UG/1
2 SPRAY, METERED NASAL 2 TIMES DAILY
Qty: 15 ML | Refills: 6 | Status: SHIPPED | OUTPATIENT
Start: 2023-04-14 | End: 2023-05-14

## 2023-04-14 NOTE — PATIENT INSTRUCTIONS
We will get copies of MRI from [facility] to see what we want to do, and if I can start medication for this    Doing well otherwise    Start B12 supplement  Start new prescription nasal spray

## 2023-04-14 NOTE — PROGRESS NOTES
"  [facility] [place] [place]    Family Medicine Office Visit    Chief Complaint   Patient presents with    Follow-up        HPI:  routine follow up for chronic conditions:    In presence of workup for renal transplant - had concerns for PHTN, but this appears to be stable.  This is all related to lupus history    Blood Pressure at goal sta  Hyperlipidemia stable on appropriate intensity statin therapy  History of aortic valve replacement  Distant history of CHF, but this seemed primarily secondary to ESRD - has follow up with Dr. Miller    Having some acute allergy issues acutely    Has been seeing Dr. Carreon at [facility] for some lumbar spine issues    ROS: as above    Vitals:    04/14/23 0843   BP: 118/72   BP Location: Right arm   Patient Position: Sitting   BP Method: Medium (Manual)   Pulse: 78   Temp: 97.9 °F (36.6 °C)   SpO2: 98%   Weight: 91.2 kg (201 lb)   Height: 6' 1" (1.854 m)      Body mass index is 26.52 kg/m².      General:  AOx3, well nourished and developed in no acute distress  Eyes:  PERRLA, EOMI, vision intact grossly  ENT:  normal hearing, moist oral mucosa  Neck:  trachea midline with no masses or thyromegaly  Heart:  RRR, IV/VI systolic murmurs.  No edema noted, extremities warm and well perfused  Lungs:  clear to auscultation bilaterally with symmetric chest movement  Abdomen:  Soft, nontender, nondistended.  Normal bowel sounds  Musculoskeletal:  Normal gait.  Normal posture.  Normal muscular development with no joint swelling.  Neurological:  CN II-XII grossly intact. Symmetric strength and sensation  Psych:  Normal mood and affect.  Able to demonstrate good judgement and personal insight.      Assessment/Plan:    1. Lupus nephritis    2. ESRD on hemodialysis    3. Pure hypercholesterolemia    4. Essential hypertension    5. Drug-induced immunodeficiency    6. Secondary hyperparathyroidism of renal origin    7. Degenerative disc disease, " lumbar        Stable with rheumatology  Stable with nephrology  At goal on statin  Stable  Noted, continue therapy  Stable given renal function  We will get copy of MRI, and consider PT as patient doesn't want more pharmacotherapy

## 2023-04-19 ENCOUNTER — PATIENT OUTREACH (OUTPATIENT)
Dept: ADMINISTRATIVE | Facility: HOSPITAL | Age: 58
End: 2023-04-19
Payer: MEDICARE

## 2023-04-19 NOTE — LETTER
"                      FAX      AUTHORIZATION FOR RELEASE OF   CONFIDENTIAL INFORMATION        MEDICAL RECORDS        We are seeing Spike Estrella, date of birth 1965, in the clinic at [facility]. Constantin Schmid MD is the patient's PCP. Spike Estrella has an outstanding lab/procedure at the time we reviewed their chart. In order to help keep their health information updated, Spike Estrella has authorized us to request the following medical record(s):       ( X )  COLONOSCOPY (WITHIN 10 YRS)              Please fax records to [facility]  [phone]        SHIRLEY HECK LPN  CLINICAL CARE COORDINATOR   [facility]  PHONE: [phone]  FAX: [phone]                           A. Consent for Examination and Treatment: I hereby authorize the providers and employees of [facility] ("[facility] to provide medical treatment/services which includes, but is not limited to, performing and administering tests and diagnostic procedures that are deemed necessary, Including, but not limited to, imaging examinations, blood tests and other laboratory procedures as may be required by the hospital, clinic, or may be ordered by my physician(s) or persons working under the general and/or special instructions of my physician(s).                   1.      I understand and agree that this consent covers all authorized persons, including but not limited to physicians, residents, nurse practitioners, physicians' assistants, specialists, consultants, student nurses, and independently contracted physicians, who are called upon by the physician in charge, to carry out the diagnostic procedures and medical or surgical treatment.  2.      I hereby authorize [facility] to retain or dispose of any specimens or tissue, should there be such remaining from any test or procedure.  3.      I hereby authorize and give consent for [facility] providers and employees to take photographs, images or " videotapes of such diagnostic, surgical or treatment procedures of Patient as may be required by Kamaljitsjose maria or as may be ordered by a physician.  I further acknowledge and agree that [facility] may use cameras or other devices for patient monitoring.  4.      I am aware that the practice of medicine is not an exact science, and I acknowledge that no guarantees have been made to me as to the outcome of any tests, procedures or treatment.                   B. Authorization for Release of Information:  I understand that my insurance company and/or their agents may need information necessary to make determinations about payment/reimbursement.  I hereby provide authorization to release to all insurance companies, their successors, assignees, other parties with whom they may have contracted, or others acting on their behalf, that are involved with payment for any hospital and/or clinic charges incurred by the patient, any information that they request and deem necessary for payment/reimbursement, and/or quality review.  I further authorize the release of my health information to physicians or other health care practitioners on staff who are involved in my health care now and in the future, and to other health care providers, entities, or institutions for the purpose of my continued care and treatment, including referrals.     C. Medicare Patient's Certification and Authorization to Release Information and Payment Request:  I certify that the information given by me in applying for payment under Title XVIII of the Social Security Act is correct.  I authorize any moreira of medical or other information about me to release to the Social Security Administration, or its intermediaries or carriers, any information needed for this or a related Medicare claim.  I request that payment of authorized benefits be made on my behalf.     D. Assignment of Insurance Benefits:  I hereby authorize any and all insurance companies, health plans,  defined benefit plans, health insurers or any entity that is or may be responsible for payment of my medical expenses to pay all hospital and medical benefits now due, and to become due and payable to me under any hospital benefits, sick benefits, injury benefits or any other benefit for services rendered to me, including Major Medical Benefits, direct to [facility] and all independently contracted physicians.  I assign any and all rights that I may have against any and all insurance companies, health plans, defined benefit plans, health insurers or any entity that is or may be responsible for payment of my medical expenses, including, but not limited to any right to appeal a denial of a claim, any right to bring any action, lawsuit, administrative proceeding, or other cause of action on my behalf.  I specifically assign my right to pursue litigation against any and all insurance companies, health plans, defined benefit plans, health insurers or any entity that is or may be responsible for payment of medical expenses based upon a refusal to pay charges.              REGISTRATION  AUTHORIZATION                    E. Valuables:  It is understood and agreed that [facility] is not liable for the damage to or loss of any money, jewelry, documents, dentures, eye glasses, hearing aids, prosthetics, or other property of value.     F. Computer Equipment:  I understand and agree that should I choose to use computer equipment owned by [facility] or if I choose to access the Internet via [facility]'s network, I do so at my own risk.  [facility] is not responsible for any damage to my computer equipment or to any damages of any type that might arise from my loss of equipment or data.                   G. Acceptance of Financial Responsibility:  I agree that in consideration of the services and supplies that have been or will be furnished to the patient,  I am hereby obligated to pay all charges made for or on the account of the patient  according to the standard rates (in effect at the time the services and supplies are delivered) established by [facility], including its Patient Financial Assistance Policy to the extent it is applicable.  I understand that I am responsible for all charges, or portions thereof, not covered by insurance or other sources.  Patient refunds will be distributed only after balances at all [facility] facilities are paid.                   H. Communication Authorization:  I hereby authorize [facility] and its representatives, along with any billing service or  who may work on their behalf, to contact me on my cell phone and/or home phone using prerecorded messages, artificial voice messages, automatic telephone dialing devices or other computer assisted technology, or by electronic mail, text messaging, or by any other form of electronic communication.  This includes, but is not limited to appointment reminders, yearly physical exam reminders, preventive care reminders, patient campaigns, welcome calls, and calls about account balances on my account or any account on which I am listed as a guarantor.  I understand I have the right to opt out of these communications at any time.     I.  Relationship Between Facility and Physician:  I understand that some, but not all, providers furnishing services to the patient are not employees or agents of [facility].  The patient is under the care and supervision of his/her attending physician, and it is the responsibility of the facility and its nursing staff to carry out the instructions of such physicians.  It is the responsibility of the patient's physician/designee to obtain the patient's informed consent, when required, for medical or surgical treatment, special diagnostic or therapeutic procedures, or hospital services rendered for the patient under the special instructions of the physician/designee.     J. Notice of Private Practices:  I acknowledge I have received a copy of  [facility]'s Notice of Privacy Practices.     K. Facility Directory:  I have discussed with the organization my desire to be either included or excluded in the facility directory.  I understand that if my choice is to opt-out of being identified in the facility directory that the facility will not provide any information about me such as my condition (e.g. Fair, stable, etc.) or my location in the facility (e.g. Room number, department).     L. LINKS:  For [place] Residents:  [facility] is a LINKS ([place] [facility]) participating facility.  [facility] is a [facility]-sponsored confidential computer system that helps you and your doctor keep track of you and your child's immunization history.  I acknowledge that I am allowing [facility] to share this information with [facility].  For [place] Residents:  [facility] is a MIIX ([place] Immunization Information eXchange) participant.  [facility] is a [place] Department of Health-sponsored confidential computer system that helps you and your doctor keep track of you and your child's immunization history.  I acknowledge that I am allowing [facility] to share information with [facility].     M. Term:  This authorization is valid for this and subsequent care/treatment I receive at [facility] and will remain valid unless/until revoked in writing by me.      ACKNOWLEDGMENT OF POTENTIAL RISKS OF COVID-19 VACCINE  It is important that you, as the patient or patients legally authorized representative(s), understand and acknowledge the following, with regard to administration of the COVID-19 vaccine offered by [facility]:   The SARS-CoV-2 virus (COVID-19) has caused an unprecedented modern global pandemic that has mobilized scientists and drug manufacturers to work to create safe and effective vaccines to get the crisis under control.   No vaccine is released in the [place] without undergoing rigorous, multi-layered testing and approval by the Food and Drug  Administration.   During a public health emergency, however, vaccines can be released for patient administration by the FDA prior to completion of multi-phase clinical trials and approval. This is done by the FDAs granting of Emergency Use Authorization (EUA) when the vaccine meets reasonable thresholds for safety and effectiveness and people are in urgent need of care. Under an EUA, the FDA has found that known and potential benefits outweigh its known and potential risks.   The vaccine for which you are presenting to [facility] has been released under an EUA, which [facility] is honoring in its distribution of the vaccine to the public. While the FDAs authorization indicates its belief that usage is recommended over possible risks, there is still the possibility that unknown risks of the vaccine could exist.   By signing this document, you acknowledge and assume these risks. Further, you waive any and all claims of liability against and hold harmless any [facility] entity or provider for any harm caused to you by said possible unknown risks of the vaccine.        N. [facility]:  As used in this document, [facility] means all [facility] affiliated entities including all health centers, surgery centers, clinics, and hospitals.  It includes more specifically, the following entities: [facility], a not for profit [facility], and its subsidiaries and affiliates, including [facility], [facility], [facility], L.[facility]neC., [facility], [facility], [facility], L[facility]C., [facility]., [facility]d/b/a [facility], L.LYvonneC.d/b/a [facility] [employer], L.L.C. As manager of [facility]UNE[facility], [facility], [facility]  [facility] Company, L.L.C.d/b/a [facility], [facility], [facility]., [facility], L.L.C., and [facility] [employer] [facility] as manager of [facility].                                                                Patient/Legal Guardian Signature                                                    This signature was collected at 11/29/2022      Spike Estrella/Self                                                                            Printed Name/Relationship to Patient                                                [facility] complies with applicable Federal civil rights laws and does not discriminate on the basis of race, color, national origin, age, disability, or sex.          ATENCIÓN: si habla español, tiene a sepulveda disposición servicios gratuitos de asistencia lingüística. Fina chowdhury [phone].         CHÚ Ý: N?u b?n nói Ti?ng Vi?t, có các d?ch v? h? tr? ngôn ng? mi?n phí dành cho b?n. G?i s? [phone].              REGISTRATION  AUTHORIZATION

## 2023-04-25 ENCOUNTER — PATIENT OUTREACH (OUTPATIENT)
Dept: ADMINISTRATIVE | Facility: HOSPITAL | Age: 58
End: 2023-04-25
Payer: MEDICARE

## 2023-04-25 NOTE — PROGRESS NOTES
Population Health Outreach.Records Received, hyper-linked into chart at this time. The following record(s)  below were uploaded for Health Maintenance .      7/29/2020-colonoscopy

## 2023-05-01 ENCOUNTER — OUTSIDE PLACE OF SERVICE (OUTPATIENT)
Dept: NEPHROLOGY | Facility: CLINIC | Age: 58
End: 2023-05-01
Payer: MEDICARE

## 2023-05-01 PROCEDURE — 90966 ESRD HOME PT SERV P MO 20+: CPT | Mod: NTX,,, | Performed by: INTERNAL MEDICINE

## 2023-05-01 PROCEDURE — 90966 PR ESRD SERVICES, HOME DIALYSIS, PER MONTH, 20+ YR OLD: ICD-10-PCS | Mod: NTX,,, | Performed by: INTERNAL MEDICINE

## 2023-06-01 ENCOUNTER — OUTSIDE PLACE OF SERVICE (OUTPATIENT)
Dept: NEPHROLOGY | Facility: CLINIC | Age: 58
End: 2023-06-01
Payer: MEDICARE

## 2023-06-01 PROCEDURE — 90966 ESRD HOME PT SERV P MO 20+: CPT | Mod: NTX,,, | Performed by: INTERNAL MEDICINE

## 2023-06-01 PROCEDURE — 90966 PR ESRD SERVICES, HOME DIALYSIS, PER MONTH, 20+ YR OLD: ICD-10-PCS | Mod: NTX,,, | Performed by: INTERNAL MEDICINE

## 2023-07-01 ENCOUNTER — OUTSIDE PLACE OF SERVICE (OUTPATIENT)
Dept: NEPHROLOGY | Facility: CLINIC | Age: 58
End: 2023-07-01
Payer: MEDICARE

## 2023-07-21 ENCOUNTER — OFFICE VISIT (OUTPATIENT)
Dept: FAMILY MEDICINE | Facility: CLINIC | Age: 58
End: 2023-07-21
Payer: MEDICARE

## 2023-07-21 VITALS
DIASTOLIC BLOOD PRESSURE: 70 MMHG | BODY MASS INDEX: 25.53 KG/M2 | WEIGHT: 192.63 LBS | HEIGHT: 73 IN | SYSTOLIC BLOOD PRESSURE: 128 MMHG | OXYGEN SATURATION: 99 % | TEMPERATURE: 98 F | HEART RATE: 70 BPM

## 2023-07-21 DIAGNOSIS — M32.14 LUPUS NEPHRITIS: Primary | ICD-10-CM

## 2023-07-21 DIAGNOSIS — M43.16 ANTEROLISTHESIS OF LUMBAR SPINE: ICD-10-CM

## 2023-07-21 DIAGNOSIS — Z95.2 HX OF AORTIC VALVE REPLACEMENT: ICD-10-CM

## 2023-07-21 DIAGNOSIS — Z99.2 ESRD ON HEMODIALYSIS: ICD-10-CM

## 2023-07-21 DIAGNOSIS — N18.6 ESRD ON HEMODIALYSIS: ICD-10-CM

## 2023-07-21 DIAGNOSIS — M32.0 DRUG-INDUCED SYSTEMIC LUPUS ERYTHEMATOSUS, UNSPECIFIED ORGAN INVOLVEMENT STATUS: ICD-10-CM

## 2023-07-21 DIAGNOSIS — I10 ESSENTIAL HYPERTENSION: Chronic | ICD-10-CM

## 2023-07-21 DIAGNOSIS — E78.00 PURE HYPERCHOLESTEROLEMIA: ICD-10-CM

## 2023-07-21 DIAGNOSIS — Z76.82 PATIENT ON WAITING LIST FOR KIDNEY TRANSPLANT: Chronic | ICD-10-CM

## 2023-07-21 PROCEDURE — 99214 OFFICE O/P EST MOD 30 MIN: CPT | Mod: NTX,S$GLB,, | Performed by: FAMILY MEDICINE

## 2023-07-21 PROCEDURE — 99214 PR OFFICE/OUTPT VISIT, EST, LEVL IV, 30-39 MIN: ICD-10-PCS | Mod: NTX,S$GLB,, | Performed by: FAMILY MEDICINE

## 2023-07-21 NOTE — PROGRESS NOTES
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 Miller - has ZAIRE upcoming     MRI with [facility] shows L5-S1 anterolisthesis    ROS: as above    Vitals:    07/21/23 1025   BP: 128/70   Pulse: 70   Temp: 98 °F (36.7 °C)   SpO2: 99%   Weight: 87.4 kg (192 lb 9.6 oz)   Height: 6' 1" (1.854 m)      Body mass index is 25.41 kg/m².      General:  AOx3, well nourished and developed in no acute distress  Eyes:  PERRLA, EOMI, vision intact grossly  ENT:  normal hearing, moist oral mucosa  Neck:  trachea midline with no masses or thyromegaly  Heart:  RRR, no murmurs.  No edema noted, extremities warm and well perfused  Lungs:  clear to auscultation bilaterally with symmetric chest movement  Abdomen:  Soft, nontender, nondistended.  Normal bowel sounds  Musculoskeletal:  Normal gait.  Normal posture.  Normal muscular development with no joint swelling.  Neurological:  CN II-XII grossly intact. Symmetric strength and sensation  Psych:  Normal mood and affect.  Able to demonstrate good judgement and personal insight.  Graft to LUE      Assessment/Plan:    1. Lupus nephritis    2. ESRD on hemodialysis    3. Patient on waiting list for kidney transplant    4. Essential hypertension    5. Pure hypercholesterolemia    6. Hx of aortic valve replacement    7. Drug-induced systemic lupus erythematosus, unspecified organ involvement status    8. Anterolisthesis of lumbar spine        Stable with nephrology  As above, awaiting kidney  As above  At goal  Stable on statin  ZAIRE pending  Stable  Advised on stretching of hip flexors and low " back/hamstrings

## 2023-07-21 NOTE — PATIENT INSTRUCTIONS
Start stretching hip and hamstrings every day (stretch forward and backward)    Continue to use tramadol as needed    Follow up 3 months

## 2023-07-24 PROCEDURE — 90966 ESRD HOME PT SERV P MO 20+: CPT | Mod: NTX,,, | Performed by: INTERNAL MEDICINE

## 2023-07-24 PROCEDURE — 90966 PR ESRD SERVICES, HOME DIALYSIS, PER MONTH, 20+ YR OLD: ICD-10-PCS | Mod: NTX,,, | Performed by: INTERNAL MEDICINE

## 2023-08-01 ENCOUNTER — OUTSIDE PLACE OF SERVICE (OUTPATIENT)
Dept: NEPHROLOGY | Facility: CLINIC | Age: 58
End: 2023-08-01
Payer: MEDICARE

## 2023-08-03 ENCOUNTER — TELEPHONE (OUTPATIENT)
Dept: TRANSPLANT | Facility: CLINIC | Age: 58
End: 2023-08-03
Payer: MEDICARE

## 2023-08-21 PROCEDURE — 90966 PR ESRD SERVICES, HOME DIALYSIS, PER MONTH, 20+ YR OLD: ICD-10-PCS | Mod: NTX,,, | Performed by: INTERNAL MEDICINE

## 2023-08-21 PROCEDURE — 90966 ESRD HOME PT SERV P MO 20+: CPT | Mod: NTX,,, | Performed by: INTERNAL MEDICINE

## 2023-09-01 ENCOUNTER — OUTSIDE PLACE OF SERVICE (OUTPATIENT)
Dept: NEPHROLOGY | Facility: CLINIC | Age: 58
End: 2023-09-01
Payer: MEDICARE

## 2023-09-01 PROCEDURE — 90966 ESRD HOME PT SERV P MO 20+: CPT | Mod: NTX,,, | Performed by: INTERNAL MEDICINE

## 2023-09-01 PROCEDURE — 90966 PR ESRD SERVICES, HOME DIALYSIS, PER MONTH, 20+ YR OLD: ICD-10-PCS | Mod: NTX,,, | Performed by: INTERNAL MEDICINE

## 2023-09-12 ENCOUNTER — TELEPHONE (OUTPATIENT)
Dept: TRANSPLANT | Facility: CLINIC | Age: 58
End: 2023-09-12
Payer: MEDICARE

## 2023-09-12 NOTE — TELEPHONE ENCOUNTER
Spoke to patient, informed him that we have not received his September sample yet but he mailed it on Zhang 9/10. Told him to give it about a week. Also informed him that we did receive records from [facility] regarding his Chest CT.

## 2023-09-12 NOTE — TELEPHONE ENCOUNTER
----- Message from Pardeep Espinoza MA sent at 9/12/2023  9:11 AM CDT -----  Regarding: Labs received  Contact: [phone]  Patient is calling to check if labs were received?

## 2023-09-28 DIAGNOSIS — Z76.82 ORGAN TRANSPLANT CANDIDATE: Primary | ICD-10-CM

## 2023-10-01 ENCOUNTER — OUTSIDE PLACE OF SERVICE (OUTPATIENT)
Dept: NEPHROLOGY | Facility: CLINIC | Age: 58
End: 2023-10-01
Payer: MEDICARE

## 2023-10-01 PROCEDURE — 90966 ESRD HOME PT SERV P MO 20+: CPT | Mod: NTX,,, | Performed by: INTERNAL MEDICINE

## 2023-10-01 PROCEDURE — 90966 PR ESRD SERVICES, HOME DIALYSIS, PER MONTH, 20+ YR OLD: ICD-10-PCS | Mod: NTX,,, | Performed by: INTERNAL MEDICINE

## 2023-10-06 ENCOUNTER — TELEPHONE (OUTPATIENT)
Dept: FAMILY MEDICINE | Facility: CLINIC | Age: 58
End: 2023-10-06
Payer: MEDICARE

## 2023-10-06 NOTE — TELEPHONE ENCOUNTER
Spoke with patient. Patient has concerns for his son. Appointment scheduled for examination for son.

## 2023-10-06 NOTE — TELEPHONE ENCOUNTER
----- Message from Elizabeth Cardozo sent at 10/6/2023  9:51 AM CDT -----  Contact: patient  Type: Needs Medical Advice  Who Called:  patient  Best Call Back Number: [phone]  Additional Information: requesting a call back- has questions about the way he is feeling.

## 2023-10-09 ENCOUNTER — TELEPHONE (OUTPATIENT)
Dept: TRANSPLANT | Facility: CLINIC | Age: 58
End: 2023-10-09
Payer: MEDICARE

## 2023-10-09 NOTE — TELEPHONE ENCOUNTER
Spoke to pt confirming appts on 12/01/2023. Appt reminders were mailed on 10/09/2023 and pt is aware to bring care giver.

## 2023-10-19 DIAGNOSIS — Z76.82 ORGAN TRANSPLANT CANDIDATE: Primary | ICD-10-CM

## 2023-10-23 ENCOUNTER — TELEPHONE (OUTPATIENT)
Dept: FAMILY MEDICINE | Facility: CLINIC | Age: 58
End: 2023-10-23
Payer: MEDICARE

## 2023-10-23 NOTE — TELEPHONE ENCOUNTER
----- Message from Mee March sent at 10/20/2023  4:10 PM CDT -----  Contact: pt  Type: Needs Medical Advice    Who Called: pt    Best Call Back Number:[phone]    Additional Information: Requesting a call back regarding  pt is asking for Dr Schmid to order a Echo done. Pt is needing for raghavendra for transplant. Pt would would like to have it done at [facility].        Please Advise- Thank you

## 2023-10-30 ENCOUNTER — HOSPITAL ENCOUNTER (OUTPATIENT)
Dept: CARDIOLOGY | Facility: HOSPITAL | Age: 58
Discharge: HOME | End: 2023-10-30
Attending: NURSE PRACTITIONER
Payer: MEDICARE

## 2023-10-30 VITALS — WEIGHT: 192 LBS | HEIGHT: 73 IN | BODY MASS INDEX: 25.45 KG/M2

## 2023-10-30 DIAGNOSIS — Z76.82 ORGAN TRANSPLANT CANDIDATE: ICD-10-CM

## 2023-10-30 PROCEDURE — 93306 TTE W/DOPPLER COMPLETE: CPT | Mod: 26,TXP,, | Performed by: INTERNAL MEDICINE

## 2023-10-30 PROCEDURE — 93306 TTE W/DOPPLER COMPLETE: CPT | Mod: TXP

## 2023-10-30 PROCEDURE — 93306 ECHO (CUPID ONLY): ICD-10-PCS | Mod: 26,TXP,, | Performed by: INTERNAL MEDICINE

## 2023-10-31 LAB
AORTIC ROOT ANNULUS: 2.94 CM
AORTIC VALVE CUSP SEPERATION: 1.16 CM
ASCENDING AORTA: 2.38 CM
AV INDEX (PROSTH): 0.42
AV MEAN GRADIENT: 16 MMHG
AV PEAK GRADIENT: 29 MMHG
AV REGURGITATION PRESSURE HALF TIME: 561.51 MS
AV VALVE AREA BY VELOCITY RATIO: 1.41 CM²
AV VALVE AREA: 1.34 CM²
AV VELOCITY RATIO: 0.44
BSA FOR ECHO PROCEDURE: 2.12 M2
CV ECHO LV RWT: 0.63 CM
DOP CALC AO PEAK VEL: 2.68 M/S
DOP CALC AO VTI: 55.4 CM
DOP CALC LVOT AREA: 3.2 CM2
DOP CALC LVOT DIAMETER: 2.03 CM
DOP CALC LVOT PEAK VEL: 1.17 M/S
DOP CALC LVOT STROKE VOLUME: 74.4 CM3
DOP CALC MV VTI: 57.4 CM
DOP CALCLVOT PEAK VEL VTI: 23 CM
E WAVE DECELERATION TIME: 227.34 MSEC
E/A RATIO: 0.95
E/E' RATIO: 18.71 M/S
ECHO LV POSTERIOR WALL: 1.33 CM (ref 0.6–1.1)
EJECTION FRACTION: 63 %
FRACTIONAL SHORTENING: 33 % (ref 28–44)
GLOBAL LONGITUIDAL STRAIN: -19 %
INTERVENTRICULAR SEPTUM: 1.26 CM (ref 0.6–1.1)
IVC DIAMETER: 1.87 CM
IVRT: 38.06 MSEC
LA MAJOR: 4.07 CM
LA MINOR: 3.34 CM
LEFT ATRIUM SIZE: 4.91 CM
LEFT ATRIUM VOLUME INDEX MOD: 18.4 ML/M2
LEFT ATRIUM VOLUME MOD: 38.86 CM3
LEFT INTERNAL DIMENSION IN SYSTOLE: 2.81 CM (ref 2.1–4)
LEFT VENTRICLE DIASTOLIC VOLUME INDEX: 37.66 ML/M2
LEFT VENTRICLE DIASTOLIC VOLUME: 79.46 ML
LEFT VENTRICLE MASS INDEX: 95 G/M2
LEFT VENTRICLE SYSTOLIC VOLUME INDEX: 14.1 ML/M2
LEFT VENTRICLE SYSTOLIC VOLUME: 29.76 ML
LEFT VENTRICULAR INTERNAL DIMENSION IN DIASTOLE: 4.22 CM (ref 3.5–6)
LEFT VENTRICULAR MASS: 200.84 G
LV LATERAL E/E' RATIO: 14.45 M/S
LV SEPTAL E/E' RATIO: 26.5 M/S
LVOT MG: 3.18 MMHG
LVOT MV: 0.85 CM/S
MV MEAN GRADIENT: 10 MMHG
MV PEAK A VEL: 1.67 M/S
MV PEAK E VEL: 1.59 M/S
MV PEAK GRADIENT: 20 MMHG
MV STENOSIS PRESSURE HALF TIME: 65.93 MS
MV VALVE AREA BY CONTINUITY EQUATION: 1.3 CM2
MV VALVE AREA P 1/2 METHOD: 3.34 CM2
PISA AR MAX VEL: 5.08 M/S
PISA MRMAX VEL: 6.74 M/S
PISA TR MAX VEL: 1.88 M/S
PV MV: 0.6 M/S
PV PEAK GRADIENT: 3 MMHG
PV PEAK VELOCITY: 0.84 M/S
RA MAJOR: 4.24 CM
RA PRESSURE ESTIMATED: 3 MMHG
RA WIDTH: 3.44 CM
RIGHT VENTRICULAR END-DIASTOLIC DIMENSION: 2.6 CM
RIGHT VENTRICULAR LENGTH IN DIASTOLE (APICAL 4-CHAMBER VIEW): 7 CM
RV MID DIAMA: 2.15 CM
RV TB RVSP: 5 MMHG
STJ: 3.11 CM
TDI LATERAL: 0.11 M/S
TDI SEPTAL: 0.06 M/S
TDI: 0.09 M/S
TR MAX PG: 14 MMHG
TRICUSPID ANNULAR PLANE SYSTOLIC EXCURSION: 1.6 CM
TRICUSPID VALVE PEAK A WAVE VELOCITY: 0.24 M/S
TV PEAK E VEL: 0.5 M/S
TV REST PULMONARY ARTERY PRESSURE: 17 MMHG
Z-SCORE OF LEFT VENTRICULAR DIMENSION IN END DIASTOLE: -4.49
Z-SCORE OF LEFT VENTRICULAR DIMENSION IN END SYSTOLE: -2.87

## 2023-11-13 ENCOUNTER — OUTSIDE PLACE OF SERVICE (OUTPATIENT)
Dept: NEPHROLOGY | Facility: CLINIC | Age: 58
End: 2023-11-13
Payer: MEDICARE

## 2023-11-13 ENCOUNTER — OUTSIDE PLACE OF SERVICE (OUTPATIENT)
Dept: NEPHROLOGY | Facility: CLINIC | Age: 58
End: 2023-11-13

## 2023-11-21 ENCOUNTER — TELEPHONE (OUTPATIENT)
Dept: TRANSPLANT | Facility: CLINIC | Age: 58
End: 2023-11-21
Payer: MEDICARE

## 2023-11-29 ENCOUNTER — TELEPHONE (OUTPATIENT)
Dept: CARDIOLOGY | Facility: HOSPITAL | Age: 58
End: 2023-11-29
Payer: MEDICARE

## 2023-12-01 ENCOUNTER — HOSPITAL ENCOUNTER (OUTPATIENT)
Dept: RADIOLOGY | Facility: HOSPITAL | Age: 58
Discharge: HOME | End: 2023-12-01
Attending: NURSE PRACTITIONER
Payer: MEDICARE

## 2023-12-01 ENCOUNTER — HOSPITAL ENCOUNTER (OUTPATIENT)
Dept: CARDIOLOGY | Facility: HOSPITAL | Age: 58
Discharge: HOME | End: 2023-12-01
Attending: NURSE PRACTITIONER
Payer: MEDICARE

## 2023-12-01 ENCOUNTER — OFFICE VISIT (OUTPATIENT)
Dept: TRANSPLANT | Facility: CLINIC | Age: 58
End: 2023-12-01
Payer: COMMERCIAL

## 2023-12-01 VITALS
HEIGHT: 73 IN | HEART RATE: 71 BPM | OXYGEN SATURATION: 100 % | RESPIRATION RATE: 18 BRPM | WEIGHT: 190.25 LBS | TEMPERATURE: 97 F | BODY MASS INDEX: 25.22 KG/M2 | SYSTOLIC BLOOD PRESSURE: 141 MMHG | DIASTOLIC BLOOD PRESSURE: 67 MMHG

## 2023-12-01 VITALS
BODY MASS INDEX: 25.45 KG/M2 | SYSTOLIC BLOOD PRESSURE: 139 MMHG | DIASTOLIC BLOOD PRESSURE: 72 MMHG | HEIGHT: 73 IN | WEIGHT: 192 LBS | HEART RATE: 71 BPM

## 2023-12-01 DIAGNOSIS — Z76.82 ORGAN TRANSPLANT CANDIDATE: ICD-10-CM

## 2023-12-01 DIAGNOSIS — Z76.82 PATIENT ON WAITING LIST FOR KIDNEY TRANSPLANT: Primary | ICD-10-CM

## 2023-12-01 DIAGNOSIS — N18.6 ESRD ON HEMODIALYSIS: ICD-10-CM

## 2023-12-01 DIAGNOSIS — Z99.2 ESRD ON HEMODIALYSIS: ICD-10-CM

## 2023-12-01 DIAGNOSIS — M32.14 LUPUS NEPHRITIS: ICD-10-CM

## 2023-12-01 DIAGNOSIS — I10 ESSENTIAL HYPERTENSION: ICD-10-CM

## 2023-12-01 DIAGNOSIS — Z95.2 HX OF AORTIC VALVE REPLACEMENT: ICD-10-CM

## 2023-12-01 LAB
CREAT SERPL-MCNC: 6.3 MG/DL (ref 0.5–1.4)
CV PHARM DOSE: 0.4 MG
CV STRESS BASE HR: 71 BPM
DIASTOLIC BLOOD PRESSURE: 72 MMHG
EJECTION FRACTION- HIGH: 59 %
END DIASTOLIC INDEX-HIGH: 155 ML/M2
END DIASTOLIC INDEX-LOW: 91 ML/M2
END SYSTOLIC INDEX-HIGH: 78 ML/M2
END SYSTOLIC INDEX-LOW: 40 ML/M2
NUC REST DIASTOLIC VOLUME INDEX: 220
NUC REST EJECTION FRACTION: 58
NUC REST SYSTOLIC VOLUME INDEX: 93
NUC STRESS DIASTOLIC VOLUME INDEX: 223
NUC STRESS EJECTION FRACTION: 68 %
NUC STRESS SYSTOLIC VOLUME INDEX: 72
OHS CV CPX 1 MINUTE RECOVERY HEART RATE: 78 BPM
OHS CV CPX 85 PERCENT MAX PREDICTED HEART RATE MALE: 136
OHS CV CPX MAX PREDICTED HEART RATE: 160
OHS CV CPX PATIENT IS FEMALE: 0
OHS CV CPX PATIENT IS MALE: 1
OHS CV CPX PEAK DIASTOLIC BLOOD PRESSURE: 65 MMHG
OHS CV CPX PEAK HEAR RATE: 71 BPM
OHS CV CPX PEAK RATE PRESSURE PRODUCT: NORMAL
OHS CV CPX PEAK SYSTOLIC BLOOD PRESSURE: 143 MMHG
OHS CV CPX PERCENT MAX PREDICTED HEART RATE ACHIEVED: 44
OHS CV CPX RATE PRESSURE PRODUCT PRESENTING: 9869
RETIRED EF AND QEF - SEE NOTES: 47 %
SAMPLE: ABNORMAL
SYSTOLIC BLOOD PRESSURE: 139 MMHG

## 2023-12-01 PROCEDURE — 93018 NUCLEAR STRESS - CARDIOLOGY INTERPRETED (CUPID ONLY): ICD-10-PCS | Mod: TXP,,, | Performed by: INTERNAL MEDICINE

## 2023-12-01 PROCEDURE — 93018 CV STRESS TEST I&R ONLY: CPT | Mod: TXP,,, | Performed by: INTERNAL MEDICINE

## 2023-12-01 PROCEDURE — 63600175 PHARM REV CODE 636 W HCPCS: Mod: TXP | Performed by: NURSE PRACTITIONER

## 2023-12-01 PROCEDURE — 99999 PR PBB SHADOW E&M-EST. PATIENT-LVL V: CPT | Mod: PBBFAC,TXP,, | Performed by: NURSE PRACTITIONER

## 2023-12-01 PROCEDURE — 78452 HT MUSCLE IMAGE SPECT MULT: CPT | Mod: 26,TXP,, | Performed by: INTERNAL MEDICINE

## 2023-12-01 PROCEDURE — 99999 PR PBB SHADOW E&M-EST. PATIENT-LVL V: ICD-10-PCS | Mod: PBBFAC,TXP,, | Performed by: NURSE PRACTITIONER

## 2023-12-01 PROCEDURE — 76770 US EXAM ABDO BACK WALL COMP: CPT | Mod: 26,TXP,, | Performed by: STUDENT IN AN ORGANIZED HEALTH CARE EDUCATION/TRAINING PROGRAM

## 2023-12-01 PROCEDURE — 74177 CT ABD & PELVIS W/CONTRAST: CPT | Mod: TC,TXP

## 2023-12-01 PROCEDURE — 71046 X-RAY EXAM CHEST 2 VIEWS: CPT | Mod: TC,TXP

## 2023-12-01 PROCEDURE — A9502 TC99M TETROFOSMIN: HCPCS | Mod: TXP

## 2023-12-01 PROCEDURE — 71046 X-RAY EXAM CHEST 2 VIEWS: CPT | Mod: 26,TXP,, | Performed by: RADIOLOGY

## 2023-12-01 PROCEDURE — 93016 NUCLEAR STRESS - CARDIOLOGY INTERPRETED (CUPID ONLY): ICD-10-PCS | Mod: TXP,,, | Performed by: INTERNAL MEDICINE

## 2023-12-01 PROCEDURE — 71046 XR CHEST PA AND LATERAL: ICD-10-PCS | Mod: 26,TXP,, | Performed by: RADIOLOGY

## 2023-12-01 PROCEDURE — 76770 US EXAM ABDO BACK WALL COMP: CPT | Mod: TC,TXP

## 2023-12-01 PROCEDURE — 93016 CV STRESS TEST SUPVJ ONLY: CPT | Mod: TXP,,, | Performed by: INTERNAL MEDICINE

## 2023-12-01 PROCEDURE — 74177 CT ABD & PELVIS W/CONTRAST: CPT | Mod: 26,TXP,, | Performed by: RADIOLOGY

## 2023-12-01 PROCEDURE — 93017 CV STRESS TEST TRACING ONLY: CPT | Mod: TXP

## 2023-12-01 PROCEDURE — 25500020 PHARM REV CODE 255: Mod: TXP | Performed by: NURSE PRACTITIONER

## 2023-12-01 PROCEDURE — 74177 CT ABDOMEN PELVIS WITH IV CONTRAST: ICD-10-PCS | Mod: 26,TXP,, | Performed by: RADIOLOGY

## 2023-12-01 PROCEDURE — 99215 PR OFFICE/OUTPT VISIT, EST, LEVL V, 40-54 MIN: ICD-10-PCS | Mod: S$PBB,TXP,, | Performed by: NURSE PRACTITIONER

## 2023-12-01 PROCEDURE — 90966 ESRD HOME PT SERV P MO 20+: CPT | Mod: NTX,,, | Performed by: INTERNAL MEDICINE

## 2023-12-01 PROCEDURE — 99215 OFFICE O/P EST HI 40 MIN: CPT | Mod: S$PBB,TXP,, | Performed by: NURSE PRACTITIONER

## 2023-12-01 PROCEDURE — 76770 US RETROPERITONEAL COMPLETE: ICD-10-PCS | Mod: 26,TXP,, | Performed by: STUDENT IN AN ORGANIZED HEALTH CARE EDUCATION/TRAINING PROGRAM

## 2023-12-01 PROCEDURE — 78452 NUCLEAR STRESS - CARDIOLOGY INTERPRETED (CUPID ONLY): ICD-10-PCS | Mod: 26,TXP,, | Performed by: INTERNAL MEDICINE

## 2023-12-01 RX ORDER — CLOPIDOGREL BISULFATE 75 MG/1
75 TABLET ORAL
COMMUNITY

## 2023-12-01 RX ORDER — REGADENOSON 0.08 MG/ML
0.4 INJECTION, SOLUTION INTRAVENOUS
Status: COMPLETED | OUTPATIENT
Start: 2023-12-01 | End: 2023-12-01

## 2023-12-01 RX ADMIN — REGADENOSON 0.4 MG: 0.08 INJECTION, SOLUTION INTRAVENOUS at 09:12

## 2023-12-01 RX ADMIN — IOHEXOL 100 ML: 350 INJECTION, SOLUTION INTRAVENOUS at 04:12

## 2023-12-01 NOTE — PROGRESS NOTES
YEARLY PATIENT EDUCATION NOTE    Mr. Spike Estrella was seen in pre-kidney transplant clinic for yearly (or six months) evaluation for kidney, kidney/pancreas or pancreas only transplant.  The patient attended a web based education session that discussed/reviewed the following aspects of transplantation: UNOS waitlist management/multiple listings, types of organs offered (KDPI < 85%, KDPI > 85%, PHS increased risk, DCD, HCV+), financial aspects, surgical procedures, dietary instruction pre- and post-transplant, health maintenance pre- and post-transplant, post-transplant hospitalization and outpatient follow-up, potential to participate in a research protocol, and medication management and side effects. A question and answer session was provided after the presentation.    The patient was seen by all members of the multi-disciplinary team to include: Nephrologist/PA, , , Pharmacist and Dietician (if applicable).    The patient reviewed and signed all consents for evaluation which were witnessed and sent to scanning into the EPIC chart.    The patient was given an education book and plan for further evaluation based on his individual assessment.      The patient was encouraged to call with any questions or concerns.

## 2023-12-01 NOTE — LETTER
December 4, 2023        Oanh López  [address] [place] [address]  Phone: [phone]  Fax: [phone]             [facility] 1st Fl  [address]  [facility] [address]  Phone: [phone]   Patient: Spike Estrella   MR Number: [id number]   YOB: 1965   Date of Visit: 12/1/2023       Dear Dr. Oanh López    Thank you for referring Spike Estrella to me for evaluation. Attached you will find relevant portions of my assessment and plan of care.    If you have questions, please do not hesitate to call me. I look forward to following Spike Estrella along with you.    Sincerely,    Jailyn Vieyra, CHITO    Enclosure    If you would like to receive this communication electronically, please contact [email or web address] or [phone] to request [facility] [employer] [facility] access.    [facility] [employer] [facility] is a tool which provides read-only access to select patient information with whom you have a relationship. Its easy to use and provides real time access to review your patients record including encounter summaries, notes, results, and demographic information.    If you feel you have received this communication in error or would no longer like to receive these types of communications, please e-mail [email or web address]

## 2023-12-01 NOTE — PROGRESS NOTES
Kidney Transplant Recipient Reevalulation    Referring Physician: Oanh López  Current Nephrologist: Oanh López  Waitlist Status: inactive  Dialysis Start Date: 7/3/2020    Subjective:     CC:  Annual reassessment of kidney transplant candidacy.    HPI:  Mr. Estrella is a 58 y.o. year old White male with ESRD secondary to lupus nephritis.  He has been on the wait list for a kidney transplant at [facility] since 10/13/2017. Patient is currently on hemodialysis started on 10/29/2019. Patient is dialyzing on home HD 4 days/week.  Patient reports that he is tolerating dialysis well.. He has a LUE AV graft.     Currently inactive since 8/3/2023. He has a history of AVR in 2014. He had a ZAIRE done 7/25/2023 that revealed a small mobile plaque in the descending thoracic aorta. He follows with outside cardiology and CTS. He underwent CT chest and plans were made for follow up every 3 months and to remain on ASA and plavix. Received letter from outside CTS 9/27/2023 that no surgical intervention necessary at this time and that Mr. Estrella is able to proceed with kidney transplant at this time. Full letter available in Media.     Lupus-follows with rheumatologist Dr. Sebastian Frost, remains on plaquenil and MMF    Remains active. Able to climb flight of stairs without CP or SOB. Looks great, not frail.    CXR 12/1/2023: Heart size normal.  Few calcified granulomas noted.  Heart size normal.  The lungs are clear.  No pleural effusion.  Postsurgical changes identified as before   CT abd/pelvis 12/1/2023: pending surgical review  Renal US 12/1/2023: Findings of chronic medical renal disease. Bilateral nonobstructing renal stones.  Iliacs 9/18/2019: favorable for transplant  Echo 10/30/2023: EF 60-65%  Stress 12/1/2023: no evidence of myocardial ischemia or infarction.   Colonoscopy 7/29/2020: internal hemorrhoids, repeat in 7-10 years     Current Outpatient Medications   Medication Sig Dispense Refill    ascorbic acid, vitamin C,  (VITAMIN C) 500 MG tablet Take 500 mg by mouth.      aspirin (ECOTRIN) 81 MG EC tablet Take 81 mg by mouth once daily.      atorvastatin (LIPITOR) 10 MG tablet Take 10 mg by mouth once daily.      clopidogreL (PLAVIX) 75 mg tablet Take 75 mg by mouth.      esomeprazole (NEXIUM) 40 MG capsule 40 mg.      ferrous sulfate 300 mg (60 mg iron)/5 mL syrup Take 300 mg by mouth once daily.      folic acid/vit B complex and C (NEPHRO-ADAMARIS ORAL) Take by mouth.      hydroxychloroquine (PLAQUENIL) 200 mg tablet Take 400 mg by mouth once daily.   11    iron polysaccharide complex, vit c-sa (FERREX 150 PLUS) 150-50-50 mg Cap capsule Take 1 capsule by mouth.      iron sucrose (VENOFER) 200 mg iron/10 mL Soln injection Inject 200 mg into the vein.      mycophenolate (CELLCEPT) 500 mg Tab 1,000 mg 2 (two) times daily.   3    SEVELAMER CARBONATE ORAL Take 800 mg by mouth 3 (three) times daily with meals.      vitamin D (VITAMIN D3) 1000 units Tab Take 1,000 Units by mouth once daily.       No current facility-administered medications for this visit.       Past Medical History:   Diagnosis Date    Anemia of renal disease     Essential hypertension     Hx of aortic valve replacement     Immunosuppressed status     Lupus nephritis     Metabolic acidosis     Personal history of colonic polyps     Secondary hyperparathyroidism of renal origin     Stage 4 chronic kidney disease     Stenosis and insufficiency of lacrimal passages     Systemic lupus erythematosus        Review of Systems   Constitutional:  Negative for activity change, appetite change and fever.   HENT:  Negative for congestion, mouth sores and sore throat.    Eyes:  Negative for visual disturbance.   Respiratory:  Negative for cough, chest tightness and shortness of breath.    Cardiovascular:  Negative for chest pain, palpitations and leg swelling.   Gastrointestinal:  Negative for abdominal distention, abdominal pain, constipation, diarrhea and nausea.   Genitourinary:   "Positive for decreased urine volume. Negative for difficulty urinating, frequency and hematuria.        Voiding 9937-9702 cc/24 hours   Musculoskeletal:  Negative for arthralgias and gait problem.   Skin:  Negative for wound.   Allergic/Immunologic: Positive for immunocompromised state. Negative for environmental allergies and food allergies.   Neurological:  Negative for dizziness, weakness and numbness.   Psychiatric/Behavioral:  Negative for sleep disturbance. The patient is not nervous/anxious.        Objective:   body mass index is 25.1 kg/m².  BP (!) 141/67 (BP Location: Right arm, Patient Position: Sitting, BP Method: Medium (Automatic))   Pulse 71   Temp 97.2 °F (36.2 °C) (Skin)   Resp 18   Ht 6' 1" (1.854 m)   Wt 86.3 kg (190 lb 4.1 oz)   SpO2 100%   BMI 25.10 kg/m²     Physical Exam  Vitals and nursing note reviewed.   Constitutional:       Appearance: Normal appearance.   HENT:      Head: Normocephalic.   Cardiovascular:      Rate and Rhythm: Normal rate and regular rhythm.      Heart sounds: Murmur heard.   Pulmonary:      Effort: Pulmonary effort is normal.      Breath sounds: Normal breath sounds.   Abdominal:      General: Bowel sounds are normal. There is no distension.      Palpations: Abdomen is soft.      Tenderness: There is no abdominal tenderness.   Musculoskeletal:         General: Normal range of motion.      Comments: LUE AV graft + thrill    Skin:     General: Skin is warm and dry.   Neurological:      General: No focal deficit present.      Mental Status: He is alert.   Psychiatric:         Behavior: Behavior normal.         Labs:  PSA, Screen (ng/mL)   Date Value   12/01/2023 0.39     Lab Results   Component Value Date    WBC 9.3 07/13/2021    HGB 12.3 07/13/2021    HCT 40.3 07/13/2021     07/13/2021    K 4.6 11/21/2023     (H) 07/13/2021    CO2 26 07/13/2021    BUN 58 (H) 07/13/2021    CREATININE 5.56 (H) 07/13/2021    EGFRNONAA 10 (L) 07/13/2021    GLUCOSE 83 " 07/13/2021    CALCIUM 9.4 07/13/2021    PHOS 4.7 (H) 06/27/2017    MG 2.2 06/19/2014    ALBUMIN 4.0 06/27/2017    AST 33 06/27/2017    ALT 21 06/27/2017    UTPCR 0.72 (H) 06/27/2017    .1 (H) 10/21/2022       Lab Results   Component Value Date    BILIRUBINUA Negative 06/27/2017    PROTEINUA 1+ (A) 06/27/2017    NITRITE neg 07/24/2017    RBCUA 0 07/24/2017    WBCUA 0 06/27/2017       Lab Results   Component Value Date    CPRA 0 05/03/2023    KB7TADE Negative 05/09/2021    CIABCLM WEAK----CW14, CW9 05/03/2023    CIIAB Negative 12/04/2022    ABCMT NONE 05/03/2023       Labs were reviewed with the patient.    Pre-transplant Workup:   Reviewed with the patient.    Assessment:     1. Patient on waiting list for kidney transplant    2. ESRD on hemodialysis    3. Lupus nephritis    4. Hx of aortic valve replacement    5. Essential hypertension    6. BMI 25.0-25.9,adult      Plan:   Currently inactive since 8/3/2023. He has a history of AVR in 2014. He had a ZAIRE done 7/25/2023 that revealed a small mobile plaque in the descending thoracic aorta. He follows with outside cardiology and CTS. He underwent CT chest and plans were made for follow up every 3 months and to remain on ASA and plavix. Received letter from outside CTS 9/27/2023 that no surgical intervention necessary at this time and that Mr. Estrella is able to proceed with kidney transplant at this time. OK to re-activate on the wait list, continue follow 3 month follow up with cardiology.       Transplant Candidacy:   Mr. Estrella is a suitable kidney transplant candidate.  Meets center eligibility for accepting HCV+ donor offer - yes.  Patient educated on HCV+ donors. [facility] is willing  to accept HCV+ donor offer -  yes   Patient is a candidate for KDPI > 85 kidney donor offer - no (weight > 88kg)  He remains in overall stable health, and will remain active on the transplant list.    Patient advised that it is recommended that all transplant candidates, and  their close contacts and household members receive Covid vaccination.    Jailyn Vieyra NP       Follow-up:   In addition to the tests noted in the plan, Mr. Estrella will continue to have reevaluation as per the standing pre-kidney transplant protocol:  Monthly blood for PRA  Annual return to clinic, except HIV positive, > 65 years of age, or pancreas transplant candidates who will be scheduled to see transplant every 6 months while in pre-transplant phase  Annual re-testing: CXR, EKG, yearly mammograms for women over 40 and PSA for males over 40, cardiology follow-up as recommended by initial cardiology pre-transplant evaluation  Renal ultrasound every 2 years  Baseline colonoscopy after age 50 and repeated as recommended    UNOS Patient Status  Functional Status: 60% - Requires occasional assistance but is able to care for needs  Physical Capacity: No Limitations

## 2023-12-04 ENCOUNTER — TELEPHONE (OUTPATIENT)
Dept: TRANSPLANT | Facility: CLINIC | Age: 58
End: 2023-12-04
Payer: MEDICARE

## 2023-12-04 NOTE — PROGRESS NOTES
Transplant Psychosocial Evaluation Update:  Last psychosocial evaluation for transplant was completed on 10/21/2022 by ORLANDO Vizcarra LCSW.      Pt presents today in company of wife, Sonja Estrella. Pt and wife present as alert, oriented to person, place, time, purpose of visit. Pt and wife deny concerns about going through with transplant and state motivation and sense of preparedness for transplantation, caregiver role, psychosocial risk factors, medical risk factors, financial aspects, and lifetime commitments. All concerns and education points as per transplant psychosocial evaluation process addressed (also refer to psychosocial dated 10/21/2022). Pt actively participated in today's interview, with wife, Sonja Estrella, participating as caregiver. Pt asking questions appropriately and denies changes to previous psychosocial evaluation for transplantation which we reviewed line by line with pt and, per pt choice, with pts caregiver today.    Primary Caregivers and Transportation for Transplant:  1. Sonja Estrella ([phone]) 57 y/o wife; resides with pt and drives reliable vehicle.      Both pt and caregiver/s plan to stay locally at lodging arranged by themselves - information reviewed in depth. Caregivers plan to stay at hospital as appropriate for transplant and post-transplant process.    Pts Vocation: Pt is disabled due to ESRD. Last day worked:  5/2020.    DME: [facility] & dialysis equipment and supplies.     ADLS:  Pt reports independent with all ADLS, drives, and handles own medication management.      Household and Dependents: pt resides with wife and 21 y/o son and has no dependents at this time.    Income: Pt states ability to afford all monthly expenses and costs including for medications now and if transplanted based on income and on savings and assets.    Dialysis Adherence: Dialysis adherence form faxed to [facility] and [facility] awaits reply.     Pt and wife deny any additional concerns, stating preparedness and  motivation to proceed with organ transplant.     Suitability for Transplant:   Pt remains suitable for transplant at this time based on psychosocial risk factors and strengths.    Pt chema well overall with health needs and life in general, has stable supports, adequate insurance, financial stability, transportation and caregiver plans in place, and is using no substances unless prescribed.

## 2023-12-06 NOTE — PROGRESS NOTES
Has some calcification of aorta and common iliacs but pretty minimal, would not interfere with transplant (externals OK).  OK for transplant but would repeat in 2 years.

## 2023-12-12 DIAGNOSIS — R91.1 SOLITARY PULMONARY NODULE: Primary | ICD-10-CM

## 2023-12-13 ENCOUNTER — TELEPHONE (OUTPATIENT)
Dept: TRANSPLANT | Facility: CLINIC | Age: 58
End: 2023-12-13
Payer: MEDICARE

## 2023-12-18 ENCOUNTER — OUTSIDE PLACE OF SERVICE (OUTPATIENT)
Dept: NEPHROLOGY | Facility: CLINIC | Age: 58
End: 2023-12-18
Payer: MEDICARE

## 2023-12-22 ENCOUNTER — HOSPITAL ENCOUNTER (OUTPATIENT)
Dept: RADIOLOGY | Facility: HOSPITAL | Age: 58
Discharge: HOME | End: 2023-12-22
Attending: NURSE PRACTITIONER
Payer: MEDICARE

## 2023-12-22 DIAGNOSIS — R91.1 SOLITARY PULMONARY NODULE: ICD-10-CM

## 2023-12-22 PROCEDURE — 71250 CT CHEST WITHOUT CONTRAST: ICD-10-PCS | Mod: 26,TXP,, | Performed by: RADIOLOGY

## 2023-12-22 PROCEDURE — 71250 CT THORAX DX C-: CPT | Mod: 26,TXP,, | Performed by: RADIOLOGY

## 2023-12-22 PROCEDURE — 71250 CT THORAX DX C-: CPT | Mod: TC,TXP

## 2023-12-27 ENCOUNTER — TELEPHONE (OUTPATIENT)
Dept: TRANSPLANT | Facility: CLINIC | Age: 58
End: 2023-12-27
Payer: MEDICARE

## 2023-12-27 ENCOUNTER — TELEPHONE (OUTPATIENT)
Dept: FAMILY MEDICINE | Facility: CLINIC | Age: 58
End: 2023-12-27

## 2023-12-27 DIAGNOSIS — Z76.82 PATIENT ON WAITING LIST FOR KIDNEY TRANSPLANT: Primary | ICD-10-CM

## 2023-12-27 NOTE — TELEPHONE ENCOUNTER
Patient is scheduled for pulmonology appointment scheduled for 4/17/2024.  Mehdi Schmid,  Please review message below from patient.  Call placed to pt due msg left, spoke w/pt states requesting an referral to pulmonary and requesting someone local with [facility].  Thank you.  Signed:  Ralf Warren LPN    ----- Message from Phyllis Stoll sent at 12/27/2023 11:30 AM CST -----  Contact: PT  Type:  Patient Requesting Referral    Who Called:PT  Does the patient already have the specialty appointment scheduled?:NO  If yes, what is the date of that appointment?:N/A  Referral to What Specialty:PULMONARY   Reason for Referral:REVIEW CT   Does the patient want the referral with a specific physician?:N/A  Is the specialist an [facility] or [facility]?: N/A  Patient Requesting a Response?:YES   Would the patient rather a call back or a response via [facility]? CALL   Best Call Back Number:CALL   Additional Information: THANK YOU

## 2024-01-01 ENCOUNTER — ANESTHESIA EVENT (OUTPATIENT)
Dept: SURGERY | Facility: HOSPITAL | Age: 59
DRG: 212 | End: 2024-01-01
Payer: MEDICARE

## 2024-01-01 ENCOUNTER — ANESTHESIA (OUTPATIENT)
Dept: SURGERY | Facility: HOSPITAL | Age: 59
DRG: 212 | End: 2024-01-01
Payer: MEDICARE

## 2024-01-01 ENCOUNTER — HOSPITAL ENCOUNTER (OUTPATIENT)
Dept: VASCULAR SURGERY | Facility: CLINIC | Age: 59
Discharge: HOME | End: 2024-08-06
Attending: THORACIC SURGERY (CARDIOTHORACIC VASCULAR SURGERY)
Payer: MEDICARE

## 2024-01-01 ENCOUNTER — LAB VISIT (OUTPATIENT)
Dept: LAB | Facility: HOSPITAL | Age: 59
End: 2024-01-01
Payer: MEDICARE

## 2024-01-01 ENCOUNTER — ANESTHESIA (OUTPATIENT)
Dept: INTENSIVE CARE | Facility: HOSPITAL | Age: 59
DRG: 212 | End: 2024-01-01
Payer: MEDICARE

## 2024-01-01 ENCOUNTER — HOSPITAL ENCOUNTER (OUTPATIENT)
Dept: RADIOLOGY | Facility: HOSPITAL | Age: 59
Discharge: HOME | End: 2024-08-06
Attending: THORACIC SURGERY (CARDIOTHORACIC VASCULAR SURGERY)
Payer: MEDICARE

## 2024-01-01 ENCOUNTER — HOSPITAL ENCOUNTER (OUTPATIENT)
Dept: PULMONOLOGY | Facility: CLINIC | Age: 59
Discharge: HOME | End: 2024-08-06
Payer: MEDICARE

## 2024-01-01 ENCOUNTER — TELEPHONE (OUTPATIENT)
Dept: CARDIOTHORACIC SURGERY | Facility: CLINIC | Age: 59
End: 2024-01-01
Payer: MEDICARE

## 2024-01-01 ENCOUNTER — OFFICE VISIT (OUTPATIENT)
Dept: CARDIOTHORACIC SURGERY | Facility: CLINIC | Age: 59
End: 2024-01-01
Payer: MEDICARE

## 2024-01-01 ENCOUNTER — HOSPITAL ENCOUNTER (INPATIENT)
Facility: HOSPITAL | Age: 59
LOS: 1 days | DRG: 212 | End: 2024-08-08
Attending: THORACIC SURGERY (CARDIOTHORACIC VASCULAR SURGERY) | Admitting: THORACIC SURGERY (CARDIOTHORACIC VASCULAR SURGERY)
Payer: MEDICARE

## 2024-01-01 ENCOUNTER — ANESTHESIA EVENT (OUTPATIENT)
Dept: INTENSIVE CARE | Facility: HOSPITAL | Age: 59
DRG: 212 | End: 2024-01-01
Payer: MEDICARE

## 2024-01-01 ENCOUNTER — DOCUMENTATION ONLY (OUTPATIENT)
Dept: CARDIOTHORACIC SURGERY | Facility: CLINIC | Age: 59
End: 2024-01-01

## 2024-01-01 ENCOUNTER — HOSPITAL ENCOUNTER (OUTPATIENT)
Dept: CARDIOLOGY | Facility: CLINIC | Age: 59
Discharge: HOME | End: 2024-08-06
Payer: MEDICARE

## 2024-01-01 ENCOUNTER — HOSPITAL ENCOUNTER (OUTPATIENT)
Dept: CARDIOLOGY | Facility: HOSPITAL | Age: 59
Discharge: HOME | End: 2024-08-06
Attending: THORACIC SURGERY (CARDIOTHORACIC VASCULAR SURGERY)
Payer: MEDICARE

## 2024-01-01 ENCOUNTER — TELEPHONE (OUTPATIENT)
Dept: CARDIOTHORACIC SURGERY | Facility: CLINIC | Age: 59
End: 2024-01-01

## 2024-01-01 VITALS
WEIGHT: 188 LBS | DIASTOLIC BLOOD PRESSURE: 70 MMHG | BODY MASS INDEX: 25.47 KG/M2 | HEIGHT: 72 IN | HEART RATE: 70 BPM | SYSTOLIC BLOOD PRESSURE: 145 MMHG

## 2024-01-01 VITALS
BODY MASS INDEX: 25.31 KG/M2 | DIASTOLIC BLOOD PRESSURE: 83 MMHG | OXYGEN SATURATION: 98 % | HEART RATE: 120 BPM | SYSTOLIC BLOOD PRESSURE: 126 MMHG | RESPIRATION RATE: 28 BRPM | TEMPERATURE: 98 F | HEIGHT: 73 IN | WEIGHT: 191 LBS

## 2024-01-01 VITALS
WEIGHT: 191.81 LBS | SYSTOLIC BLOOD PRESSURE: 150 MMHG | DIASTOLIC BLOOD PRESSURE: 70 MMHG | BODY MASS INDEX: 26.01 KG/M2 | OXYGEN SATURATION: 99 % | HEART RATE: 69 BPM

## 2024-01-01 DIAGNOSIS — I34.0 SEVERE MITRAL INSUFFICIENCY: ICD-10-CM

## 2024-01-01 DIAGNOSIS — Z95.2 HX OF AORTIC VALVE REPLACEMENT: ICD-10-CM

## 2024-01-01 DIAGNOSIS — R74.8 ELEVATED ALKALINE PHOSPHATASE LEVEL: ICD-10-CM

## 2024-01-01 DIAGNOSIS — I34.0 NONRHEUMATIC MITRAL VALVE REGURGITATION: ICD-10-CM

## 2024-01-01 DIAGNOSIS — Z01.818 PRE-OP TESTING: ICD-10-CM

## 2024-01-01 DIAGNOSIS — Z98.890 HISTORY OF HEART SURGERY: Primary | ICD-10-CM

## 2024-01-01 DIAGNOSIS — Z98.890 POST-OPERATIVE STATE: Primary | ICD-10-CM

## 2024-01-01 DIAGNOSIS — I34.0 NONRHEUMATIC MITRAL VALVE REGURGITATION: Primary | ICD-10-CM

## 2024-01-01 DIAGNOSIS — Z76.82 ORGAN TRANSPLANT CANDIDATE: ICD-10-CM

## 2024-01-01 DIAGNOSIS — I49.9 ARRHYTHMIA: ICD-10-CM

## 2024-01-01 DIAGNOSIS — N18.6 ESRD ON HEMODIALYSIS: ICD-10-CM

## 2024-01-01 DIAGNOSIS — R73.9 HYPERGLYCEMIA: ICD-10-CM

## 2024-01-01 DIAGNOSIS — I77.810 ASCENDING AORTA DILATATION: ICD-10-CM

## 2024-01-01 DIAGNOSIS — Z99.2 ESRD ON HEMODIALYSIS: ICD-10-CM

## 2024-01-01 LAB
ABO + RH BLD: NORMAL
ABO + RH BLD: NORMAL
ALBUMIN SERPL BCP-MCNC: 1.8 G/DL (ref 3.5–5.2)
ALBUMIN SERPL BCP-MCNC: 1.9 G/DL (ref 3.5–5.2)
ALBUMIN SERPL BCP-MCNC: 1.9 G/DL (ref 3.5–5.2)
ALBUMIN SERPL BCP-MCNC: 2.5 G/DL (ref 3.5–5.2)
ALBUMIN SERPL BCP-MCNC: 2.7 G/DL (ref 3.5–5.2)
ALBUMIN SERPL BCP-MCNC: 2.7 G/DL (ref 3.5–5.2)
ALLENS TEST: ABNORMAL
ALP SERPL-CCNC: 59 U/L (ref 55–135)
ALP SERPL-CCNC: 62 U/L (ref 55–135)
ALP SERPL-CCNC: 67 U/L (ref 55–135)
ALP SERPL-CCNC: 79 U/L (ref 55–135)
ALT SERPL W/O P-5'-P-CCNC: 2176 U/L (ref 10–44)
ALT SERPL W/O P-5'-P-CCNC: 4114 U/L (ref 10–44)
ALT SERPL W/O P-5'-P-CCNC: 450 U/L (ref 10–44)
ALT SERPL W/O P-5'-P-CCNC: 63 U/L (ref 10–44)
ANION GAP SERPL CALC-SCNC: 20 MMOL/L (ref 8–16)
ANION GAP SERPL CALC-SCNC: 24 MMOL/L (ref 8–16)
ANION GAP SERPL CALC-SCNC: 26 MMOL/L (ref 8–16)
ANION GAP SERPL CALC-SCNC: 28 MMOL/L (ref 8–16)
ANION GAP SERPL CALC-SCNC: 28 MMOL/L (ref 8–16)
ANION GAP SERPL CALC-SCNC: 29 MMOL/L (ref 8–16)
ANISOCYTOSIS BLD QL SMEAR: SLIGHT
ANISOCYTOSIS BLD QL SMEAR: SLIGHT
APTT PPP: 51.1 SEC (ref 21–32)
APTT PPP: 54.9 SEC (ref 21–32)
ASCENDING AORTA: 3.57 CM
AST SERPL-CCNC: 140 U/L (ref 10–40)
AST SERPL-CCNC: 2269 U/L (ref 10–40)
AST SERPL-CCNC: 5012 U/L (ref 10–40)
AST SERPL-CCNC: 505 U/L (ref 10–40)
AV INDEX (PROSTH): 0.79
AV PEAK GRADIENT: 46 MMHG
AV VALVE AREA BY VELOCITY RATIO: 1.96 CM²
AV VALVE AREA: 2.9 CM²
AV VELOCITY RATIO: 0.54
BASO STIPL BLD QL SMEAR: ABNORMAL
BASOPHILS # BLD AUTO: 0.01 K/UL (ref 0–0.2)
BASOPHILS # BLD AUTO: 0.02 K/UL (ref 0–0.2)
BASOPHILS # BLD AUTO: 0.03 K/UL (ref 0–0.2)
BASOPHILS NFR BLD: 0.1 % (ref 0–1.9)
BASOPHILS NFR BLD: 0.2 % (ref 0–1.9)
BASOPHILS NFR BLD: 0.2 % (ref 0–1.9)
BILIRUB SERPL-MCNC: 0.9 MG/DL (ref 0.1–1)
BILIRUB SERPL-MCNC: 1.8 MG/DL (ref 0.1–1)
BILIRUB SERPL-MCNC: 2.2 MG/DL (ref 0.1–1)
BILIRUB SERPL-MCNC: 2.9 MG/DL (ref 0.1–1)
BLD GP AB SCN CELLS X3 SERPL QL: NORMAL
BLD GP AB SCN CELLS X3 SERPL QL: NORMAL
BLD PROD TYP BPU: NORMAL
BLOOD UNIT EXPIRATION DATE: NORMAL
BLOOD UNIT TYPE CODE: 5100
BLOOD UNIT TYPE CODE: 600
BLOOD UNIT TYPE CODE: 6200
BLOOD UNIT TYPE CODE: 9500
BLOOD UNIT TYPE: NORMAL
BSA FOR ECHO PROCEDURE: 2.08 M2
BUN SERPL-MCNC: 16 MG/DL (ref 6–20)
BUN SERPL-MCNC: 17 MG/DL (ref 6–20)
BUN SERPL-MCNC: 30 MG/DL (ref 6–20)
BUN SERPL-MCNC: 33 MG/DL (ref 6–20)
BUN SERPL-MCNC: 36 MG/DL (ref 6–20)
BUN SERPL-MCNC: 36 MG/DL (ref 6–20)
BURR CELLS BLD QL SMEAR: ABNORMAL
BURR CELLS BLD QL SMEAR: ABNORMAL
CA-I BLDV-SCNC: 0.98 MMOL/L (ref 1.06–1.42)
CA-I BLDV-SCNC: 0.98 MMOL/L (ref 1.06–1.42)
CALCIUM SERPL-MCNC: 10.1 MG/DL (ref 8.7–10.5)
CALCIUM SERPL-MCNC: 10.9 MG/DL (ref 8.7–10.5)
CALCIUM SERPL-MCNC: 11.5 MG/DL (ref 8.7–10.5)
CALCIUM SERPL-MCNC: 11.5 MG/DL (ref 8.7–10.5)
CALCIUM SERPL-MCNC: 9.4 MG/DL (ref 8.7–10.5)
CALCIUM SERPL-MCNC: 9.7 MG/DL (ref 8.7–10.5)
CHLORIDE SERPL-SCNC: 106 MMOL/L (ref 95–110)
CHLORIDE SERPL-SCNC: 109 MMOL/L (ref 95–110)
CHLORIDE SERPL-SCNC: 110 MMOL/L (ref 95–110)
CHLORIDE SERPL-SCNC: 110 MMOL/L (ref 95–110)
CHLORIDE SERPL-SCNC: 112 MMOL/L (ref 95–110)
CHLORIDE SERPL-SCNC: 113 MMOL/L (ref 95–110)
CO2 SERPL-SCNC: 10 MMOL/L (ref 23–29)
CO2 SERPL-SCNC: 10 MMOL/L (ref 23–29)
CO2 SERPL-SCNC: 12 MMOL/L (ref 23–29)
CO2 SERPL-SCNC: 13 MMOL/L (ref 23–29)
CO2 SERPL-SCNC: 16 MMOL/L (ref 23–29)
CO2 SERPL-SCNC: 7 MMOL/L (ref 23–29)
CODING SYSTEM: NORMAL
CREAT SERPL-MCNC: 2.8 MG/DL (ref 0.5–1.4)
CREAT SERPL-MCNC: 3.1 MG/DL (ref 0.5–1.4)
CREAT SERPL-MCNC: 4.9 MG/DL (ref 0.5–1.4)
CREAT SERPL-MCNC: 5.7 MG/DL (ref 0.5–1.4)
CREAT SERPL-MCNC: 5.9 MG/DL (ref 0.5–1.4)
CREAT SERPL-MCNC: 6.4 MG/DL (ref 0.5–1.4)
CROSSMATCH INTERPRETATION: NORMAL
CV ECHO LV RWT: 0.36 CM
DACRYOCYTES BLD QL SMEAR: ABNORMAL
DELSYS: ABNORMAL
DIFFERENTIAL METHOD BLD: ABNORMAL
DISPENSE STATUS: NORMAL
DLCO SINGLE BREATH LLN: 23.91
DLCO SINGLE BREATH PRE REF: 54.9 %
DLCO SINGLE BREATH REF: 30.84
DLCOC SBVA LLN: 2.98
DLCOC SBVA REF: 4.09
DLCOC SINGLE BREATH LLN: 23.91
DLCOC SINGLE BREATH REF: 30.84
DLCOCSBVAULN: 5.21
DLCOCSINGLEBREATHULN: 37.77
DLCOSINGLEBREATHULN: 37.77
DLCOSINGLEBREATHZSCORE: -3.3
DLCOVA LLN: 2.98
DLCOVA PRE REF: 59.3 %
DLCOVA PRE: 2.43 ML/(MIN*MMHG*L) (ref 2.98–5.21)
DLCOVA REF: 4.09
DLCOVAULN: 5.21
DOHLE BOD BLD QL SMEAR: PRESENT
DOHLE BOD BLD QL SMEAR: PRESENT
DOP CALC AO PEAK VEL: 3.4 M/S
DOP CALC AO VTI: 45.25 CM
DOP CALC LVOT AREA: 3.7 CM2
DOP CALC LVOT DIAMETER: 2.16 CM
DOP CALC LVOT PEAK VEL: 1.82 M/S
DOP CALC LVOT STROKE VOLUME: 131.08 CM3
DOP CALC MV VTI: 60.4 CM
DOP CALCLVOT PEAK VEL VTI: 35.79 CM
E/E' RATIO: 13.58 M/S
ECHO LV POSTERIOR WALL: 1 CM (ref 0.6–1.1)
EOSINOPHIL # BLD AUTO: 0 K/UL (ref 0–0.5)
EOSINOPHIL NFR BLD: 0 % (ref 0–8)
EOSINOPHIL NFR BLD: 0.2 % (ref 0–8)
ERYTHROCYTE [DISTWIDTH] IN BLOOD BY AUTOMATED COUNT: 14.4 % (ref 11.5–14.5)
ERYTHROCYTE [DISTWIDTH] IN BLOOD BY AUTOMATED COUNT: 15 % (ref 11.5–14.5)
ERYTHROCYTE [DISTWIDTH] IN BLOOD BY AUTOMATED COUNT: 15.2 % (ref 11.5–14.5)
ERYTHROCYTE [DISTWIDTH] IN BLOOD BY AUTOMATED COUNT: 16.6 % (ref 11.5–14.5)
ERYTHROCYTE [DISTWIDTH] IN BLOOD BY AUTOMATED COUNT: 17.5 % (ref 11.5–14.5)
ERYTHROCYTE [DISTWIDTH] IN BLOOD BY AUTOMATED COUNT: 17.9 % (ref 11.5–14.5)
ERYTHROCYTE [SEDIMENTATION RATE] IN BLOOD BY WESTERGREN METHOD: 26 MM/H
ERYTHROCYTE [SEDIMENTATION RATE] IN BLOOD BY WESTERGREN METHOD: 28 MM/H
EST. GFR  (NO RACE VARIABLE): 10.2 ML/MIN/1.73 M^2
EST. GFR  (NO RACE VARIABLE): 10.7 ML/MIN/1.73 M^2
EST. GFR  (NO RACE VARIABLE): 12.8 ML/MIN/1.73 M^2
EST. GFR  (NO RACE VARIABLE): 22.2 ML/MIN/1.73 M^2
EST. GFR  (NO RACE VARIABLE): 25 ML/MIN/1.73 M^2
EST. GFR  (NO RACE VARIABLE): 9.3 ML/MIN/1.73 M^2
FEF 25 75 LLN: 1.96
FEF 25 75 PRE REF: 59.2 %
FEF 25 75 REF: 3.67
FEV05 LLN: 1.87
FEV05 REF: 3
FEV1 FVC LLN: 65
FEV1 FVC PRE REF: 92.7 %
FEV1 FVC REF: 77
FEV1 LLN: 2.84
FEV1 PRE REF: 92.2 %
FEV1 REF: 3.78
FEV1FVCZSCORE: -0.8
FEV1ZSCORE: -0.53
FIBRINOGEN PPP-MCNC: 129 MG/DL (ref 182–400)
FIBRINOGEN PPP-MCNC: 156 MG/DL (ref 182–400)
FIBRINOGEN PPP-MCNC: 193 MG/DL (ref 182–400)
FIBRINOGEN PPP-MCNC: 222 MG/DL (ref 182–400)
FIO2: 100
FRACTIONAL SHORTENING: 41 % (ref 28–44)
FVC LLN: 3.75
FVC PRE REF: 99.1 %
FVC REF: 4.93
FVCZSCORE: -0.06
GIANT PLATELETS BLD QL SMEAR: PRESENT
GLUCOSE SERPL-MCNC: 100 MG/DL (ref 70–110)
GLUCOSE SERPL-MCNC: 107 MG/DL (ref 70–110)
GLUCOSE SERPL-MCNC: 113 MG/DL (ref 70–110)
GLUCOSE SERPL-MCNC: 114 MG/DL (ref 70–110)
GLUCOSE SERPL-MCNC: 116 MG/DL (ref 70–110)
GLUCOSE SERPL-MCNC: 119 MG/DL (ref 70–110)
GLUCOSE SERPL-MCNC: 127 MG/DL (ref 70–110)
GLUCOSE SERPL-MCNC: 134 MG/DL (ref 70–110)
GLUCOSE SERPL-MCNC: 135 MG/DL (ref 70–110)
GLUCOSE SERPL-MCNC: 146 MG/DL (ref 70–110)
GLUCOSE SERPL-MCNC: 149 MG/DL (ref 70–110)
GLUCOSE SERPL-MCNC: 155 MG/DL (ref 70–110)
GLUCOSE SERPL-MCNC: 158 MG/DL (ref 70–110)
GLUCOSE SERPL-MCNC: 178 MG/DL (ref 70–110)
GLUCOSE SERPL-MCNC: 179 MG/DL (ref 70–110)
GLUCOSE SERPL-MCNC: 180 MG/DL (ref 70–110)
GLUCOSE SERPL-MCNC: 201 MG/DL (ref 70–110)
GLUCOSE SERPL-MCNC: 217 MG/DL (ref 70–110)
GLUCOSE SERPL-MCNC: 230 MG/DL (ref 70–110)
GLUCOSE SERPL-MCNC: 232 MG/DL (ref 70–110)
GLUCOSE SERPL-MCNC: 237 MG/DL (ref 70–110)
GLUCOSE SERPL-MCNC: 243 MG/DL (ref 70–110)
GLUCOSE SERPL-MCNC: 78 MG/DL (ref 70–110)
GLUCOSE SERPL-MCNC: 80 MG/DL (ref 70–110)
GLUCOSE SERPL-MCNC: 97 MG/DL (ref 70–110)
HCO3 UR-SCNC: 10.3 MMOL/L (ref 24–28)
HCO3 UR-SCNC: 10.5 MMOL/L (ref 24–28)
HCO3 UR-SCNC: 11.3 MMOL/L (ref 24–28)
HCO3 UR-SCNC: 11.6 MMOL/L (ref 24–28)
HCO3 UR-SCNC: 11.7 MMOL/L (ref 24–28)
HCO3 UR-SCNC: 12.2 MMOL/L (ref 24–28)
HCO3 UR-SCNC: 12.5 MMOL/L (ref 24–28)
HCO3 UR-SCNC: 13.1 MMOL/L (ref 24–28)
HCO3 UR-SCNC: 13.8 MMOL/L (ref 24–28)
HCO3 UR-SCNC: 14.4 MMOL/L (ref 24–28)
HCO3 UR-SCNC: 14.6 MMOL/L (ref 24–28)
HCO3 UR-SCNC: 14.7 MMOL/L (ref 24–28)
HCO3 UR-SCNC: 15.1 MMOL/L (ref 24–28)
HCO3 UR-SCNC: 16.5 MMOL/L (ref 24–28)
HCO3 UR-SCNC: 16.6 MMOL/L (ref 24–28)
HCO3 UR-SCNC: 17.9 MMOL/L (ref 24–28)
HCO3 UR-SCNC: 18 MMOL/L (ref 24–28)
HCO3 UR-SCNC: 18 MMOL/L (ref 24–28)
HCO3 UR-SCNC: 18.2 MMOL/L (ref 24–28)
HCO3 UR-SCNC: 18.4 MMOL/L (ref 24–28)
HCO3 UR-SCNC: 18.8 MMOL/L (ref 24–28)
HCO3 UR-SCNC: 18.8 MMOL/L (ref 24–28)
HCO3 UR-SCNC: 19.7 MMOL/L (ref 24–28)
HCO3 UR-SCNC: 20.8 MMOL/L (ref 24–28)
HCO3 UR-SCNC: 21 MMOL/L (ref 24–28)
HCO3 UR-SCNC: 21.7 MMOL/L (ref 24–28)
HCO3 UR-SCNC: 21.8 MMOL/L (ref 24–28)
HCO3 UR-SCNC: 21.8 MMOL/L (ref 24–28)
HCO3 UR-SCNC: 22.2 MMOL/L (ref 24–28)
HCO3 UR-SCNC: 22.3 MMOL/L (ref 24–28)
HCO3 UR-SCNC: 22.4 MMOL/L (ref 24–28)
HCO3 UR-SCNC: 22.4 MMOL/L (ref 24–28)
HCO3 UR-SCNC: 22.7 MMOL/L (ref 24–28)
HCO3 UR-SCNC: 25.4 MMOL/L (ref 24–28)
HCO3 UR-SCNC: 55.3 MMOL/L (ref 24–28)
HCO3 UR-SCNC: 9.9 MMOL/L (ref 24–28)
HCT VFR BLD AUTO: 22.7 % (ref 40–54)
HCT VFR BLD AUTO: 23.9 % (ref 40–54)
HCT VFR BLD AUTO: 24.2 % (ref 40–54)
HCT VFR BLD AUTO: 29.3 % (ref 40–54)
HCT VFR BLD AUTO: 29.4 % (ref 40–54)
HCT VFR BLD AUTO: 35.9 % (ref 40–54)
HCT VFR BLD CALC: 15 %PCV (ref 36–54)
HCT VFR BLD CALC: 16 %PCV (ref 36–54)
HCT VFR BLD CALC: 17 %PCV (ref 36–54)
HCT VFR BLD CALC: 19 %PCV (ref 36–54)
HCT VFR BLD CALC: 20 %PCV (ref 36–54)
HCT VFR BLD CALC: 21 %PCV (ref 36–54)
HCT VFR BLD CALC: 22 %PCV (ref 36–54)
HCT VFR BLD CALC: 23 %PCV (ref 36–54)
HCT VFR BLD CALC: 24 %PCV (ref 36–54)
HCT VFR BLD CALC: 25 %PCV (ref 36–54)
HCT VFR BLD CALC: 25 %PCV (ref 36–54)
HCT VFR BLD CALC: 26 %PCV (ref 36–54)
HCT VFR BLD CALC: 27 %PCV (ref 36–54)
HCT VFR BLD CALC: 27 %PCV (ref 36–54)
HCT VFR BLD CALC: 30 %PCV (ref 36–54)
HCT VFR BLD CALC: 31 %PCV (ref 36–54)
HCT VFR BLD CALC: 32 %PCV (ref 36–54)
HCT VFR BLD CALC: 35 %PCV (ref 36–54)
HGB BLD-MCNC: 11.1 G/DL (ref 14–18)
HGB BLD-MCNC: 7 G/DL (ref 14–18)
HGB BLD-MCNC: 7.1 G/DL (ref 14–18)
HGB BLD-MCNC: 7.3 G/DL (ref 14–18)
HGB BLD-MCNC: 8.7 G/DL (ref 14–18)
HGB BLD-MCNC: 9 G/DL (ref 14–18)
HPRA INTERPRETATION: NORMAL
HPRA INTERPRETATION: NORMAL
HR MV ECHO: 72 BPM
HYPOCHROMIA BLD QL SMEAR: ABNORMAL
IMM GRANULOCYTES # BLD AUTO: 0.13 K/UL (ref 0–0.04)
IMM GRANULOCYTES # BLD AUTO: 0.13 K/UL (ref 0–0.04)
IMM GRANULOCYTES # BLD AUTO: 0.19 K/UL (ref 0–0.04)
IMM GRANULOCYTES # BLD AUTO: 0.19 K/UL (ref 0–0.04)
IMM GRANULOCYTES # BLD AUTO: 0.2 K/UL (ref 0–0.04)
IMM GRANULOCYTES NFR BLD AUTO: 0.7 % (ref 0–0.5)
IMM GRANULOCYTES NFR BLD AUTO: 0.8 % (ref 0–0.5)
IMM GRANULOCYTES NFR BLD AUTO: 0.9 % (ref 0–0.5)
IMM GRANULOCYTES NFR BLD AUTO: 1.1 % (ref 0–0.5)
IMM GRANULOCYTES NFR BLD AUTO: 1.2 % (ref 0–0.5)
INR PPP: 1.3 (ref 0.8–1.2)
INR PPP: 1.4 (ref 0.8–1.2)
INR PPP: 2.4 (ref 0.8–1.2)
INTERVENTRICULAR SEPTUM: 0.92 CM (ref 0.6–1.1)
IVC PRE: 4.68 L (ref 3.75–6.12)
IVC SINGLE BREATH LLN: 3.75
IVC SINGLE BREATH PRE REF: 95 %
IVC SINGLE BREATH REF: 4.93
IVCSINGLEBREATHULN: 6.12
IVRT: 57.09 MSEC
LA MAJOR: 5.41 CM
LA MINOR: 5.47 CM
LA WIDTH: 4.44 CM
LDH SERPL L TO P-CCNC: 0.8 MMOL/L (ref 0.36–1.25)
LDH SERPL L TO P-CCNC: 10.08 MMOL/L (ref 0.36–1.25)
LDH SERPL L TO P-CCNC: 11.46 MMOL/L (ref 0.36–1.25)
LDH SERPL L TO P-CCNC: 11.78 MMOL/L (ref 0.36–1.25)
LDH SERPL L TO P-CCNC: 12.05 MMOL/L (ref 0.36–1.25)
LDH SERPL L TO P-CCNC: 12.39 MMOL/L (ref 0.36–1.25)
LDH SERPL L TO P-CCNC: 12.6 MMOL/L (ref 0.36–1.25)
LDH SERPL L TO P-CCNC: 12.61 MMOL/L (ref 0.36–1.25)
LDH SERPL L TO P-CCNC: 12.93 MMOL/L (ref 0.36–1.25)
LDH SERPL L TO P-CCNC: 12.97 MMOL/L (ref 0.36–1.25)
LDH SERPL L TO P-CCNC: 13.07 MMOL/L (ref 0.36–1.25)
LDH SERPL L TO P-CCNC: 13.17 MMOL/L (ref 0.36–1.25)
LDH SERPL L TO P-CCNC: 14.07 MMOL/L (ref 0.36–1.25)
LDH SERPL L TO P-CCNC: 14.14 MMOL/L (ref 0.36–1.25)
LDH SERPL L TO P-CCNC: 14.6 MMOL/L (ref 0.36–1.25)
LDH SERPL L TO P-CCNC: 14.78 MMOL/L (ref 0.36–1.25)
LDH SERPL L TO P-CCNC: 15.23 MMOL/L (ref 0.36–1.25)
LDH SERPL L TO P-CCNC: 15.34 MMOL/L (ref 0.36–1.25)
LDH SERPL L TO P-CCNC: 18.43 MMOL/L (ref 0.36–1.25)
LDH SERPL L TO P-CCNC: 19.22 MMOL/L (ref 0.36–1.25)
LDH SERPL L TO P-CCNC: 5.51 MMOL/L (ref 0.36–1.25)
LDH SERPL L TO P-CCNC: 5.76 MMOL/L (ref 0.36–1.25)
LDH SERPL L TO P-CCNC: 5.84 MMOL/L (ref 0.36–1.25)
LDH SERPL L TO P-CCNC: 5.89 MMOL/L (ref 0.36–1.25)
LDH SERPL L TO P-CCNC: 5.95 MMOL/L (ref 0.36–1.25)
LDH SERPL L TO P-CCNC: 7.96 MMOL/L (ref 0.36–1.25)
LDH SERPL L TO P-CCNC: 9.53 MMOL/L (ref 0.36–1.25)
LEFT ATRIUM SIZE: 3.69 CM
LEFT ATRIUM VOLUME INDEX MOD: 61.5 ML/M2
LEFT ATRIUM VOLUME INDEX: 36.4 ML/M2
LEFT ATRIUM VOLUME MOD: 128 CM3
LEFT ATRIUM VOLUME: 75.76 CM3
LEFT INTERNAL DIMENSION IN SYSTOLE: 3.25 CM (ref 2.1–4)
LEFT VENTRICLE DIASTOLIC VOLUME INDEX: 63.22 ML/M2
LEFT VENTRICLE DIASTOLIC VOLUME: 131.49 ML
LEFT VENTRICLE MASS INDEX: 97 G/M2
LEFT VENTRICLE SYSTOLIC VOLUME INDEX: 20.5 ML/M2
LEFT VENTRICLE SYSTOLIC VOLUME: 42.62 ML
LEFT VENTRICULAR INTERNAL DIMENSION IN DIASTOLE: 5.5 CM (ref 3.5–6)
LEFT VENTRICULAR MASS: 202.06 G
LV LATERAL E/E' RATIO: 12.9 M/S
LV SEPTAL E/E' RATIO: 14.33 M/S
LYMPHOCYTES # BLD AUTO: 1.1 K/UL (ref 1–4.8)
LYMPHOCYTES # BLD AUTO: 1.3 K/UL (ref 1–4.8)
LYMPHOCYTES # BLD AUTO: 1.4 K/UL (ref 1–4.8)
LYMPHOCYTES # BLD AUTO: 1.4 K/UL (ref 1–4.8)
LYMPHOCYTES # BLD AUTO: 1.8 K/UL (ref 1–4.8)
LYMPHOCYTES NFR BLD: 11.1 % (ref 18–48)
LYMPHOCYTES NFR BLD: 6.3 % (ref 18–48)
LYMPHOCYTES NFR BLD: 6.7 % (ref 18–48)
LYMPHOCYTES NFR BLD: 7.3 % (ref 18–48)
LYMPHOCYTES NFR BLD: 8 % (ref 18–48)
MAGNESIUM SERPL-MCNC: 2.8 MG/DL (ref 1.6–2.6)
MAGNESIUM SERPL-MCNC: 3 MG/DL (ref 1.6–2.6)
MAGNESIUM SERPL-MCNC: 3.1 MG/DL (ref 1.6–2.6)
MCH RBC QN AUTO: 28 PG (ref 27–31)
MCH RBC QN AUTO: 28.6 PG (ref 27–31)
MCH RBC QN AUTO: 28.8 PG (ref 27–31)
MCH RBC QN AUTO: 29.6 PG (ref 27–31)
MCH RBC QN AUTO: 30 PG (ref 27–31)
MCH RBC QN AUTO: 30.1 PG (ref 27–31)
MCHC RBC AUTO-ENTMCNC: 28.9 G/DL (ref 32–36)
MCHC RBC AUTO-ENTMCNC: 29.6 G/DL (ref 32–36)
MCHC RBC AUTO-ENTMCNC: 29.7 G/DL (ref 32–36)
MCHC RBC AUTO-ENTMCNC: 30.7 G/DL (ref 32–36)
MCHC RBC AUTO-ENTMCNC: 30.9 G/DL (ref 32–36)
MCHC RBC AUTO-ENTMCNC: 32.2 G/DL (ref 32–36)
MCV RBC AUTO: 100 FL (ref 82–98)
MCV RBC AUTO: 93 FL (ref 82–98)
MCV RBC AUTO: 94 FL (ref 82–98)
MCV RBC AUTO: 94 FL (ref 82–98)
MCV RBC AUTO: 97 FL (ref 82–98)
MCV RBC AUTO: 99 FL (ref 82–98)
METHEMOGLOBIN: 1.6 % (ref 0–3)
MIN VOL: 11.1
MIN VOL: 14.9
MIN VOL: 15.1
MIN VOL: 16.1
MODE: ABNORMAL
MONOCYTES # BLD AUTO: 0.9 K/UL (ref 0.3–1)
MONOCYTES # BLD AUTO: 1 K/UL (ref 0.3–1)
MONOCYTES # BLD AUTO: 1.1 K/UL (ref 0.3–1)
MONOCYTES # BLD AUTO: 1.2 K/UL (ref 0.3–1)
MONOCYTES # BLD AUTO: 1.3 K/UL (ref 0.3–1)
MONOCYTES NFR BLD: 4.8 % (ref 4–15)
MONOCYTES NFR BLD: 5.8 % (ref 4–15)
MONOCYTES NFR BLD: 6.1 % (ref 4–15)
MONOCYTES NFR BLD: 7 % (ref 4–15)
MONOCYTES NFR BLD: 7.6 % (ref 4–15)
MV A" WAVE DURATION": 22.55 MSEC
MV MEAN GRADIENT: 9 MMHG
MV PEAK E VEL: 1.29 M/S
MV PEAK GRADIENT: 18 MMHG
MV VALVE AREA BY CONTINUITY EQUATION: 2.17 CM2
MVV LLN: 124
MVV PRE REF: 83.8 %
MVV REF: 145
NEUTROPHILS # BLD AUTO: 13 K/UL (ref 1.8–7.7)
NEUTROPHILS # BLD AUTO: 14.6 K/UL (ref 1.8–7.7)
NEUTROPHILS # BLD AUTO: 14.6 K/UL (ref 1.8–7.7)
NEUTROPHILS # BLD AUTO: 15 K/UL (ref 1.8–7.7)
NEUTROPHILS # BLD AUTO: 18.8 K/UL (ref 1.8–7.7)
NEUTROPHILS NFR BLD: 81.8 % (ref 38–73)
NEUTROPHILS NFR BLD: 83.5 % (ref 38–73)
NEUTROPHILS NFR BLD: 85.1 % (ref 38–73)
NEUTROPHILS NFR BLD: 85.4 % (ref 38–73)
NEUTROPHILS NFR BLD: 87.9 % (ref 38–73)
NRBC BLD-RTO: 0 /100 WBC
NUM UNITS TRANS FFP: NORMAL
NUM UNITS TRANS PACKED RBC: NORMAL
OHS QRS DURATION: 134 MS
OHS QRS DURATION: 160 MS
OHS QTC CALCULATION: 486 MS
OHS QTC CALCULATION: 495 MS
OVALOCYTES BLD QL SMEAR: ABNORMAL
PCO2 BLDA: 32 MMHG (ref 35–45)
PCO2 BLDA: 32.6 MMHG (ref 35–45)
PCO2 BLDA: 32.8 MMHG (ref 35–45)
PCO2 BLDA: 33.6 MMHG (ref 35–45)
PCO2 BLDA: 35 MMHG (ref 35–45)
PCO2 BLDA: 35 MMHG (ref 35–45)
PCO2 BLDA: 35.3 MMHG (ref 35–45)
PCO2 BLDA: 35.4 MMHG (ref 35–45)
PCO2 BLDA: 35.8 MMHG (ref 35–45)
PCO2 BLDA: 36 MMHG (ref 35–45)
PCO2 BLDA: 36.7 MMHG (ref 35–45)
PCO2 BLDA: 37.1 MMHG (ref 35–45)
PCO2 BLDA: 37.1 MMHG (ref 35–45)
PCO2 BLDA: 37.2 MMHG (ref 35–45)
PCO2 BLDA: 38 MMHG (ref 35–45)
PCO2 BLDA: 40.5 MMHG (ref 35–45)
PCO2 BLDA: 40.6 MMHG (ref 35–45)
PCO2 BLDA: 41.8 MMHG (ref 35–45)
PCO2 BLDA: 42 MMHG (ref 35–45)
PCO2 BLDA: 42.2 MMHG (ref 35–45)
PCO2 BLDA: 43.1 MMHG (ref 35–45)
PCO2 BLDA: 43.2 MMHG (ref 35–45)
PCO2 BLDA: 44 MMHG (ref 35–45)
PCO2 BLDA: 44.2 MMHG (ref 35–45)
PCO2 BLDA: 45.1 MMHG (ref 35–45)
PCO2 BLDA: 45.2 MMHG (ref 35–45)
PCO2 BLDA: 45.6 MMHG (ref 35–45)
PCO2 BLDA: 45.8 MMHG (ref 35–45)
PCO2 BLDA: 47.4 MMHG (ref 35–45)
PCO2 BLDA: 47.8 MMHG (ref 35–45)
PCO2 BLDA: 48.2 MMHG (ref 35–45)
PCO2 BLDA: 49.9 MMHG (ref 35–45)
PCO2 BLDA: 50.8 MMHG (ref 35–45)
PCO2 BLDA: 51.7 MMHG (ref 35–45)
PCO2 BLDA: 52.2 MMHG (ref 35–45)
PCO2 BLDA: 52.9 MMHG (ref 35–45)
PCO2 BLDA: 59.1 MMHG (ref 35–45)
PCO2 BLDA: 79.6 MMHG (ref 35–45)
PEEP: 12
PEF LLN: 7.23
PEF PRE REF: 90.3 %
PEF REF: 9.68
PH SMN: 6.91 [PH] (ref 7.35–7.45)
PH SMN: 6.94 [PH] (ref 7.35–7.45)
PH SMN: 7.04 [PH] (ref 7.35–7.45)
PH SMN: 7.05 [PH] (ref 7.35–7.45)
PH SMN: 7.06 [PH] (ref 7.35–7.45)
PH SMN: 7.07 [PH] (ref 7.35–7.45)
PH SMN: 7.08 [PH] (ref 7.35–7.45)
PH SMN: 7.09 [PH] (ref 7.35–7.45)
PH SMN: 7.1 [PH] (ref 7.35–7.45)
PH SMN: 7.12 [PH] (ref 7.35–7.45)
PH SMN: 7.12 [PH] (ref 7.35–7.45)
PH SMN: 7.14 [PH] (ref 7.35–7.45)
PH SMN: 7.15 [PH] (ref 7.35–7.45)
PH SMN: 7.15 [PH] (ref 7.35–7.45)
PH SMN: 7.16 [PH] (ref 7.35–7.45)
PH SMN: 7.16 [PH] (ref 7.35–7.45)
PH SMN: 7.17 [PH] (ref 7.35–7.45)
PH SMN: 7.2 [PH] (ref 7.35–7.45)
PH SMN: 7.23 [PH] (ref 7.35–7.45)
PH SMN: 7.23 [PH] (ref 7.35–7.45)
PH SMN: 7.24 [PH] (ref 7.35–7.45)
PH SMN: 7.25 [PH] (ref 7.35–7.45)
PH SMN: 7.26 [PH] (ref 7.35–7.45)
PH SMN: 7.3 [PH] (ref 7.35–7.45)
PH SMN: 7.33 [PH] (ref 7.35–7.45)
PH SMN: 7.35 [PH] (ref 7.35–7.45)
PH SMN: 7.36 [PH] (ref 7.35–7.45)
PH SMN: 7.38 [PH] (ref 7.35–7.45)
PH SMN: 7.39 [PH] (ref 7.35–7.45)
PH SMN: 7.39 [PH] (ref 7.35–7.45)
PH SMN: 7.4 [PH] (ref 7.35–7.45)
PH SMN: 7.4 [PH] (ref 7.35–7.45)
PH SMN: 7.45 [PH] (ref 7.35–7.45)
PHOSPHATE SERPL-MCNC: 10.6 MG/DL (ref 2.7–4.5)
PHOSPHATE SERPL-MCNC: 5.3 MG/DL (ref 2.7–4.5)
PHOSPHATE SERPL-MCNC: 6.2 MG/DL (ref 2.7–4.5)
PHOSPHATE SERPL-MCNC: 6.7 MG/DL (ref 2.7–4.5)
PHOSPHATE SERPL-MCNC: 7.8 MG/DL (ref 2.7–4.5)
PHYSICIAN COMMENT: ABNORMAL
PIP: 33
PIP: 36
PIP: 37
PIP: 39
PISA MRMAX VEL: 0.07 M/S
PISA TR MAX VEL: 2.67 M/S
PLATELET # BLD AUTO: 100 K/UL (ref 150–450)
PLATELET # BLD AUTO: 102 K/UL (ref 150–450)
PLATELET # BLD AUTO: 58 K/UL (ref 150–450)
PLATELET # BLD AUTO: 66 K/UL (ref 150–450)
PLATELET # BLD AUTO: 80 K/UL (ref 150–450)
PLATELET # BLD AUTO: 85 K/UL (ref 150–450)
PLATELET BLD QL SMEAR: ABNORMAL
PMV BLD AUTO: 10.1 FL (ref 9.2–12.9)
PMV BLD AUTO: 10.1 FL (ref 9.2–12.9)
PMV BLD AUTO: 10.6 FL (ref 9.2–12.9)
PMV BLD AUTO: 10.8 FL (ref 9.2–12.9)
PMV BLD AUTO: 10.8 FL (ref 9.2–12.9)
PMV BLD AUTO: 11.3 FL (ref 9.2–12.9)
PO2 BLDA: 106 MMHG (ref 80–100)
PO2 BLDA: 114 MMHG (ref 80–100)
PO2 BLDA: 137 MMHG (ref 80–100)
PO2 BLDA: 152 MMHG (ref 80–100)
PO2 BLDA: 158 MMHG (ref 80–100)
PO2 BLDA: 166 MMHG (ref 80–100)
PO2 BLDA: 171 MMHG (ref 80–100)
PO2 BLDA: 175 MMHG (ref 80–100)
PO2 BLDA: 191 MMHG (ref 80–100)
PO2 BLDA: 196 MMHG (ref 80–100)
PO2 BLDA: 201 MMHG (ref 80–100)
PO2 BLDA: 203 MMHG (ref 80–100)
PO2 BLDA: 206 MMHG (ref 80–100)
PO2 BLDA: 208 MMHG (ref 80–100)
PO2 BLDA: 244 MMHG (ref 80–100)
PO2 BLDA: 245 MMHG (ref 80–100)
PO2 BLDA: 252 MMHG (ref 80–100)
PO2 BLDA: 282 MMHG (ref 80–100)
PO2 BLDA: 292 MMHG (ref 80–100)
PO2 BLDA: 297 MMHG (ref 80–100)
PO2 BLDA: 343 MMHG (ref 80–100)
PO2 BLDA: 405 MMHG (ref 80–100)
PO2 BLDA: 44 MMHG (ref 40–60)
PO2 BLDA: 47 MMHG (ref 40–60)
PO2 BLDA: 57 MMHG (ref 80–100)
PO2 BLDA: 59 MMHG (ref 80–100)
PO2 BLDA: 65 MMHG (ref 80–100)
PO2 BLDA: 74 MMHG (ref 80–100)
PO2 BLDA: 75 MMHG (ref 80–100)
PO2 BLDA: 76 MMHG (ref 80–100)
PO2 BLDA: 78 MMHG (ref 80–100)
PO2 BLDA: 80 MMHG (ref 80–100)
PO2 BLDA: 81 MMHG (ref 80–100)
PO2 BLDA: 83 MMHG (ref 80–100)
PO2 BLDA: 84 MMHG (ref 80–100)
PO2 BLDA: 86 MMHG (ref 80–100)
PO2 BLDA: 88 MMHG (ref 80–100)
PO2 BLDA: 92 MMHG (ref 80–100)
POC BE: -1 MMOL/L
POC BE: -10 MMOL/L
POC BE: -12 MMOL/L
POC BE: -14 MMOL/L
POC BE: -14 MMOL/L
POC BE: -15 MMOL/L
POC BE: -16 MMOL/L
POC BE: -16 MMOL/L
POC BE: -17 MMOL/L
POC BE: -2 MMOL/L
POC BE: -2 MMOL/L
POC BE: -20 MMOL/L
POC BE: -20 MMOL/L
POC BE: -21 MMOL/L
POC BE: -22 MMOL/L
POC BE: -3 MMOL/L
POC BE: -4 MMOL/L
POC BE: -4 MMOL/L
POC BE: -5 MMOL/L
POC BE: -5 MMOL/L
POC BE: -6 MMOL/L
POC BE: -6 MMOL/L
POC BE: -9 MMOL/L
POC BE: >30 MMOL/L
POC IONIZED CALCIUM: 0.7 MMOL/L (ref 1.06–1.42)
POC IONIZED CALCIUM: 0.72 MMOL/L (ref 1.06–1.42)
POC IONIZED CALCIUM: 0.78 MMOL/L (ref 1.06–1.42)
POC IONIZED CALCIUM: 0.81 MMOL/L (ref 1.06–1.42)
POC IONIZED CALCIUM: 0.83 MMOL/L (ref 1.06–1.42)
POC IONIZED CALCIUM: 0.83 MMOL/L (ref 1.06–1.42)
POC IONIZED CALCIUM: 0.84 MMOL/L (ref 1.06–1.42)
POC IONIZED CALCIUM: 0.87 MMOL/L (ref 1.06–1.42)
POC IONIZED CALCIUM: 0.88 MMOL/L (ref 1.06–1.42)
POC IONIZED CALCIUM: 0.9 MMOL/L (ref 1.06–1.42)
POC IONIZED CALCIUM: 0.9 MMOL/L (ref 1.06–1.42)
POC IONIZED CALCIUM: 0.91 MMOL/L (ref 1.06–1.42)
POC IONIZED CALCIUM: 0.91 MMOL/L (ref 1.06–1.42)
POC IONIZED CALCIUM: 0.92 MMOL/L (ref 1.06–1.42)
POC IONIZED CALCIUM: 0.94 MMOL/L (ref 1.06–1.42)
POC IONIZED CALCIUM: 0.95 MMOL/L (ref 1.06–1.42)
POC IONIZED CALCIUM: 0.96 MMOL/L (ref 1.06–1.42)
POC IONIZED CALCIUM: 0.97 MMOL/L (ref 1.06–1.42)
POC IONIZED CALCIUM: 1.01 MMOL/L (ref 1.06–1.42)
POC IONIZED CALCIUM: 1.02 MMOL/L (ref 1.06–1.42)
POC IONIZED CALCIUM: 1.06 MMOL/L (ref 1.06–1.42)
POC IONIZED CALCIUM: 1.07 MMOL/L (ref 1.06–1.42)
POC IONIZED CALCIUM: 1.09 MMOL/L (ref 1.06–1.42)
POC IONIZED CALCIUM: 1.1 MMOL/L (ref 1.06–1.42)
POC IONIZED CALCIUM: 1.13 MMOL/L (ref 1.06–1.42)
POC IONIZED CALCIUM: 1.15 MMOL/L (ref 1.06–1.42)
POC IONIZED CALCIUM: 1.17 MMOL/L (ref 1.06–1.42)
POC IONIZED CALCIUM: 1.18 MMOL/L (ref 1.06–1.42)
POC IONIZED CALCIUM: 1.19 MMOL/L (ref 1.06–1.42)
POC IONIZED CALCIUM: 1.32 MMOL/L (ref 1.06–1.42)
POC IONIZED CALCIUM: 1.32 MMOL/L (ref 1.06–1.42)
POC IONIZED CALCIUM: 1.34 MMOL/L (ref 1.06–1.42)
POC IONIZED CALCIUM: 1.44 MMOL/L (ref 1.06–1.42)
POC IONIZED CALCIUM: 1.52 MMOL/L (ref 1.06–1.42)
POC SATURATED O2: 100 % (ref 95–100)
POC SATURATED O2: 78 % (ref 95–100)
POC SATURATED O2: 81 % (ref 95–100)
POC SATURATED O2: 81 % (ref 95–100)
POC SATURATED O2: 86 % (ref 95–100)
POC SATURATED O2: 86 % (ref 95–100)
POC SATURATED O2: 88 % (ref 95–100)
POC SATURATED O2: 89 % (ref 95–100)
POC SATURATED O2: 91 % (ref 95–100)
POC SATURATED O2: 92 % (ref 95–100)
POC SATURATED O2: 93 % (ref 95–100)
POC SATURATED O2: 93 % (ref 95–100)
POC SATURATED O2: 95 % (ref 95–100)
POC SATURATED O2: 96 % (ref 95–100)
POC SATURATED O2: 96 % (ref 95–100)
POC SATURATED O2: 98 % (ref 95–100)
POC SATURATED O2: 98 % (ref 95–100)
POC SATURATED O2: 99 % (ref 95–100)
POC TCO2: 11 MMOL/L (ref 23–27)
POC TCO2: 12 MMOL/L (ref 23–27)
POC TCO2: 12 MMOL/L (ref 23–27)
POC TCO2: 13 MMOL/L (ref 23–27)
POC TCO2: 14 MMOL/L (ref 23–27)
POC TCO2: 14 MMOL/L (ref 23–27)
POC TCO2: 15 MMOL/L (ref 23–27)
POC TCO2: 16 MMOL/L (ref 23–27)
POC TCO2: 18 MMOL/L (ref 23–27)
POC TCO2: 18 MMOL/L (ref 23–27)
POC TCO2: 19 MMOL/L (ref 23–27)
POC TCO2: 20 MMOL/L (ref 23–27)
POC TCO2: 21 MMOL/L (ref 24–29)
POC TCO2: 22 MMOL/L (ref 23–27)
POC TCO2: 22 MMOL/L (ref 23–27)
POC TCO2: 23 MMOL/L (ref 23–27)
POC TCO2: 24 MMOL/L (ref 23–27)
POC TCO2: 27 MMOL/L (ref 23–27)
POC TCO2: >50 MMOL/L (ref 24–29)
POCT GLUCOSE: 100 MG/DL (ref 70–110)
POCT GLUCOSE: 103 MG/DL (ref 70–110)
POCT GLUCOSE: 104 MG/DL (ref 70–110)
POCT GLUCOSE: 106 MG/DL (ref 70–110)
POCT GLUCOSE: 121 MG/DL (ref 70–110)
POCT GLUCOSE: 125 MG/DL (ref 70–110)
POCT GLUCOSE: 127 MG/DL (ref 70–110)
POCT GLUCOSE: 129 MG/DL (ref 70–110)
POCT GLUCOSE: 130 MG/DL (ref 70–110)
POCT GLUCOSE: 144 MG/DL (ref 70–110)
POCT GLUCOSE: 147 MG/DL (ref 70–110)
POCT GLUCOSE: 153 MG/DL (ref 70–110)
POCT GLUCOSE: 169 MG/DL (ref 70–110)
POCT GLUCOSE: 203 MG/DL (ref 70–110)
POCT GLUCOSE: 69 MG/DL (ref 70–110)
POCT GLUCOSE: 85 MG/DL (ref 70–110)
POCT GLUCOSE: 88 MG/DL (ref 70–110)
POCT GLUCOSE: 89 MG/DL (ref 70–110)
POIKILOCYTOSIS BLD QL SMEAR: SLIGHT
POIKILOCYTOSIS BLD QL SMEAR: SLIGHT
POLYCHROMASIA BLD QL SMEAR: ABNORMAL
POLYCHROMASIA BLD QL SMEAR: ABNORMAL
POTASSIUM BLD-SCNC: 2.8 MMOL/L (ref 3.5–5.1)
POTASSIUM BLD-SCNC: 2.9 MMOL/L (ref 3.5–5.1)
POTASSIUM BLD-SCNC: 3 MMOL/L (ref 3.5–5.1)
POTASSIUM BLD-SCNC: 3 MMOL/L (ref 3.5–5.1)
POTASSIUM BLD-SCNC: 3.1 MMOL/L (ref 3.5–5.1)
POTASSIUM BLD-SCNC: 3.2 MMOL/L (ref 3.5–5.1)
POTASSIUM BLD-SCNC: 3.2 MMOL/L (ref 3.5–5.1)
POTASSIUM BLD-SCNC: 3.3 MMOL/L (ref 3.5–5.1)
POTASSIUM BLD-SCNC: 3.3 MMOL/L (ref 3.5–5.1)
POTASSIUM BLD-SCNC: 3.4 MMOL/L (ref 3.5–5.1)
POTASSIUM BLD-SCNC: 3.4 MMOL/L (ref 3.5–5.1)
POTASSIUM BLD-SCNC: 3.5 MMOL/L (ref 3.5–5.1)
POTASSIUM BLD-SCNC: 3.6 MMOL/L (ref 3.5–5.1)
POTASSIUM BLD-SCNC: 3.8 MMOL/L (ref 3.5–5.1)
POTASSIUM BLD-SCNC: 3.9 MMOL/L (ref 3.5–5.1)
POTASSIUM BLD-SCNC: 4 MMOL/L (ref 3.5–5.1)
POTASSIUM BLD-SCNC: 4.1 MMOL/L (ref 3.5–5.1)
POTASSIUM BLD-SCNC: 4.1 MMOL/L (ref 3.5–5.1)
POTASSIUM BLD-SCNC: 4.3 MMOL/L (ref 3.5–5.1)
POTASSIUM BLD-SCNC: 4.5 MMOL/L (ref 3.5–5.1)
POTASSIUM BLD-SCNC: 4.5 MMOL/L (ref 3.5–5.1)
POTASSIUM BLD-SCNC: 4.6 MMOL/L (ref 3.5–5.1)
POTASSIUM BLD-SCNC: 4.7 MMOL/L (ref 3.5–5.1)
POTASSIUM BLD-SCNC: 4.9 MMOL/L (ref 3.5–5.1)
POTASSIUM BLD-SCNC: 5.1 MMOL/L (ref 3.5–5.1)
POTASSIUM BLD-SCNC: 5.2 MMOL/L (ref 3.5–5.1)
POTASSIUM BLD-SCNC: 5.4 MMOL/L (ref 3.5–5.1)
POTASSIUM BLD-SCNC: 5.5 MMOL/L (ref 3.5–5.1)
POTASSIUM BLD-SCNC: 5.6 MMOL/L (ref 3.5–5.1)
POTASSIUM BLD-SCNC: 5.8 MMOL/L (ref 3.5–5.1)
POTASSIUM BLD-SCNC: 6.4 MMOL/L (ref 3.5–5.1)
POTASSIUM BLD-SCNC: 6.9 MMOL/L (ref 3.5–5.1)
POTASSIUM SERPL-SCNC: 3.2 MMOL/L (ref 3.5–5.1)
POTASSIUM SERPL-SCNC: 3.3 MMOL/L (ref 3.5–5.1)
POTASSIUM SERPL-SCNC: 3.6 MMOL/L (ref 3.5–5.1)
POTASSIUM SERPL-SCNC: 3.7 MMOL/L (ref 3.5–5.1)
POTASSIUM SERPL-SCNC: 4 MMOL/L (ref 3.5–5.1)
POTASSIUM SERPL-SCNC: 4.3 MMOL/L (ref 3.5–5.1)
POTASSIUM SERPL-SCNC: 5.3 MMOL/L (ref 3.5–5.1)
POTASSIUM SERPL-SCNC: 6.6 MMOL/L (ref 3.5–5.1)
PRE DLCO: 16.94 ML/(MIN*MMHG) (ref 23.91–37.77)
PRE FEF 25 75: 2.17 L/S (ref 1.96–5.38)
PRE FET 100: 6.46 SEC
PRE FEV05 REF: 84.5 %
PRE FEV1 FVC: 71.34 % (ref 64.93–87.51)
PRE FEV1: 3.49 L (ref 2.84–4.67)
PRE FEV5: 2.54 L (ref 1.87–4.14)
PRE FVC: 4.89 L (ref 3.75–6.12)
PRE MVV: 121.74 L/MIN (ref 123.55–167.16)
PRE PEF: 8.75 L/S (ref 7.23–12.13)
PROT SERPL-MCNC: 3.6 G/DL (ref 6–8.4)
PROT SERPL-MCNC: 3.8 G/DL (ref 6–8.4)
PROT SERPL-MCNC: 4.3 G/DL (ref 6–8.4)
PROT SERPL-MCNC: 4.7 G/DL (ref 6–8.4)
PROTHROMBIN TIME: 13.8 SEC (ref 9–12.5)
PROTHROMBIN TIME: 15.5 SEC (ref 9–12.5)
PROTHROMBIN TIME: 25 SEC (ref 9–12.5)
PULM VEIN S/D RATIO: 1.45
PV PEAK D VEL: 0.31 M/S
PV PEAK S VEL: 0.45 M/S
RA MAJOR: 5.12 CM
RA PRESSURE ESTIMATED: 8 MMHG
RA WIDTH: 4.35 CM
RBC # BLD AUTO: 2.43 M/UL (ref 4.6–6.2)
RBC # BLD AUTO: 2.45 M/UL (ref 4.6–6.2)
RBC # BLD AUTO: 2.54 M/UL (ref 4.6–6.2)
RBC # BLD AUTO: 2.94 M/UL (ref 4.6–6.2)
RBC # BLD AUTO: 3.12 M/UL (ref 4.6–6.2)
RBC # BLD AUTO: 3.69 M/UL (ref 4.6–6.2)
RIGHT ATRIAL AREA: 19 CM2
RIGHT VENTRICLE DIASTOLIC BASEL DIMENSION: 4.4 CM
RV TB RVSP: 11 MMHG
SAMPLE: ABNORMAL
SAMPLE: NORMAL
SCHISTOCYTES BLD QL SMEAR: ABNORMAL
SCHISTOCYTES BLD QL SMEAR: PRESENT
SINUS: 3.42 CM
SITE: ABNORMAL
SODIUM BLD-SCNC: 136 MMOL/L (ref 136–145)
SODIUM BLD-SCNC: 138 MMOL/L (ref 136–145)
SODIUM BLD-SCNC: 139 MMOL/L (ref 136–145)
SODIUM BLD-SCNC: 140 MMOL/L (ref 136–145)
SODIUM BLD-SCNC: 141 MMOL/L (ref 136–145)
SODIUM BLD-SCNC: 142 MMOL/L (ref 136–145)
SODIUM BLD-SCNC: 143 MMOL/L (ref 136–145)
SODIUM BLD-SCNC: 145 MMOL/L (ref 136–145)
SODIUM BLD-SCNC: 145 MMOL/L (ref 136–145)
SODIUM BLD-SCNC: 146 MMOL/L (ref 136–145)
SODIUM BLD-SCNC: 147 MMOL/L (ref 136–145)
SODIUM BLD-SCNC: 147 MMOL/L (ref 136–145)
SODIUM BLD-SCNC: 148 MMOL/L (ref 136–145)
SODIUM BLD-SCNC: 149 MMOL/L (ref 136–145)
SODIUM BLD-SCNC: 151 MMOL/L (ref 136–145)
SODIUM BLD-SCNC: 163 MMOL/L (ref 136–145)
SODIUM SERPL-SCNC: 139 MMOL/L (ref 136–145)
SODIUM SERPL-SCNC: 141 MMOL/L (ref 136–145)
SODIUM SERPL-SCNC: 149 MMOL/L (ref 136–145)
SODIUM SERPL-SCNC: 149 MMOL/L (ref 136–145)
SODIUM SERPL-SCNC: 152 MMOL/L (ref 136–145)
SODIUM SERPL-SCNC: 153 MMOL/L (ref 136–145)
SP02: 47
SP02: 62
SP02: 66
SPECIMEN OUTDATE: NORMAL
SPECIMEN OUTDATE: NORMAL
SPHEROCYTES BLD QL SMEAR: ABNORMAL
SPHEROCYTES BLD QL SMEAR: ABNORMAL
STJ: 2.91 CM
TDI LATERAL: 0.1 M/S
TDI SEPTAL: 0.09 M/S
TDI: 0.1 M/S
TOXIC GRANULES BLD QL SMEAR: PRESENT
TR MAX PG: 29 MMHG
TRICUSPID ANNULAR PLANE SYSTOLIC EXCURSION: 2.26 CM
TV REST PULMONARY ARTERY PRESSURE: 37 MMHG
UNIT NUMBER: NORMAL
VA PRE: 6.98 L (ref 7.38–7.38)
VA SINGLE BREATH LLN: 7.38
VA SINGLE BREATH PRE REF: 94.5 %
VA SINGLE BREATH REF: 7.38
VASINGLEBREATHULN: 7.38
VT: 400
VT: 500
WBC # BLD AUTO: 15.92 K/UL (ref 3.9–12.7)
WBC # BLD AUTO: 17.11 K/UL (ref 3.9–12.7)
WBC # BLD AUTO: 17.48 K/UL (ref 3.9–12.7)
WBC # BLD AUTO: 17.65 K/UL (ref 3.9–12.7)
WBC # BLD AUTO: 21.39 K/UL (ref 3.9–12.7)
WBC # BLD AUTO: 7.86 K/UL (ref 3.9–12.7)
WBC TOXIC VACUOLES BLD QL SMEAR: PRESENT
Z-SCORE OF LEFT VENTRICULAR DIMENSION IN END DIASTOLE: -1.47
Z-SCORE OF LEFT VENTRICULAR DIMENSION IN END SYSTOLE: -1.43

## 2024-01-01 PROCEDURE — C1729 CATH, DRAINAGE: HCPCS | Mod: NTX | Performed by: THORACIC SURGERY (CARDIOTHORACIC VASCULAR SURGERY)

## 2024-01-01 PROCEDURE — 71250 CT THORAX DX C-: CPT | Mod: TC,TXP

## 2024-01-01 PROCEDURE — 82800 BLOOD PH: CPT | Mod: NTX

## 2024-01-01 PROCEDURE — 86832 HLA CLASS I HIGH DEFIN QUAL: CPT | Performed by: NURSE PRACTITIONER

## 2024-01-01 PROCEDURE — P9012 CRYOPRECIPITATE EACH UNIT: HCPCS

## 2024-01-01 PROCEDURE — 63600367 HC NITRIC OXIDE PER HOUR: Mod: NTX

## 2024-01-01 PROCEDURE — 36430 TRANSFUSION BLD/BLD COMPNT: CPT | Mod: NTX

## 2024-01-01 PROCEDURE — 33859 AS-AORT GRF F/DS OTH/THN DSJ: CPT | Mod: GC,,, | Performed by: THORACIC SURGERY (CARDIOTHORACIC VASCULAR SURGERY)

## 2024-01-01 PROCEDURE — 94010 BREATHING CAPACITY TEST: CPT | Mod: 26,S$PBB,NTX, | Performed by: INTERNAL MEDICINE

## 2024-01-01 PROCEDURE — 80069 RENAL FUNCTION PANEL: CPT | Mod: NTX | Performed by: NURSE PRACTITIONER

## 2024-01-01 PROCEDURE — 94761 N-INVAS EAR/PLS OXIMETRY MLT: CPT | Mod: NTX,XB

## 2024-01-01 PROCEDURE — 25000003 PHARM REV CODE 250: Mod: NTX

## 2024-01-01 PROCEDURE — 37000008 HC ANESTHESIA 1ST 15 MINUTES: Mod: NTX | Performed by: THORACIC SURGERY (CARDIOTHORACIC VASCULAR SURGERY)

## 2024-01-01 PROCEDURE — 83605 ASSAY OF LACTIC ACID: CPT | Mod: NTX

## 2024-01-01 PROCEDURE — 93312 ECHO TRANSESOPHAGEAL: CPT | Mod: 26,59,NTX, | Performed by: ANESTHESIOLOGY

## 2024-01-01 PROCEDURE — 25000242 PHARM REV CODE 250 ALT 637 W/ HCPCS: Mod: NTX | Performed by: PHYSICIAN ASSISTANT

## 2024-01-01 PROCEDURE — P9017 PLASMA 1 DONOR FRZ W/IN 8 HR: HCPCS

## 2024-01-01 PROCEDURE — 37000009 HC ANESTHESIA EA ADD 15 MINS: Mod: NTX | Performed by: THORACIC SURGERY (CARDIOTHORACIC VASCULAR SURGERY)

## 2024-01-01 PROCEDURE — 85610 PROTHROMBIN TIME: CPT | Mod: 91,NTX | Performed by: ANESTHESIOLOGY

## 2024-01-01 PROCEDURE — 63600175 PHARM REV CODE 636 W HCPCS: Mod: NTX | Performed by: STUDENT IN AN ORGANIZED HEALTH CARE EDUCATION/TRAINING PROGRAM

## 2024-01-01 PROCEDURE — 99291 CRITICAL CARE FIRST HOUR: CPT | Mod: GC,NTX,ICN, | Performed by: ANESTHESIOLOGY

## 2024-01-01 PROCEDURE — 84132 ASSAY OF SERUM POTASSIUM: CPT | Mod: NTX

## 2024-01-01 PROCEDURE — 71046 X-RAY EXAM CHEST 2 VIEWS: CPT | Mod: TC,FY,TXP

## 2024-01-01 PROCEDURE — 02PA0JZ REMOVAL OF SYNTHETIC SUBSTITUTE FROM HEART, OPEN APPROACH: ICD-10-PCS | Performed by: THORACIC SURGERY (CARDIOTHORACIC VASCULAR SURGERY)

## 2024-01-01 PROCEDURE — 30233N1 TRANSFUSION OF NONAUTOLOGOUS RED BLOOD CELLS INTO PERIPHERAL VEIN, PERCUTANEOUS APPROACH: ICD-10-PCS | Performed by: THORACIC SURGERY (CARDIOTHORACIC VASCULAR SURGERY)

## 2024-01-01 PROCEDURE — 80053 COMPREHEN METABOLIC PANEL: CPT | Mod: NTX | Performed by: STUDENT IN AN ORGANIZED HEALTH CARE EDUCATION/TRAINING PROGRAM

## 2024-01-01 PROCEDURE — 93010 ELECTROCARDIOGRAM REPORT: CPT | Mod: NTX,,, | Performed by: INTERNAL MEDICINE

## 2024-01-01 PROCEDURE — 25000003 PHARM REV CODE 250: Mod: NTX | Performed by: PHYSICIAN ASSISTANT

## 2024-01-01 PROCEDURE — 63600175 PHARM REV CODE 636 W HCPCS: Mod: NTX

## 2024-01-01 PROCEDURE — 27201423 OPTIME MED/SURG SUP & DEVICES STERILE SUPPLY: Mod: NTX | Performed by: THORACIC SURGERY (CARDIOTHORACIC VASCULAR SURGERY)

## 2024-01-01 PROCEDURE — 86965 POOLING BLOOD PLATELETS: CPT

## 2024-01-01 PROCEDURE — 85730 THROMBOPLASTIN TIME PARTIAL: CPT | Mod: NTX | Performed by: STUDENT IN AN ORGANIZED HEALTH CARE EDUCATION/TRAINING PROGRAM

## 2024-01-01 PROCEDURE — 82330 ASSAY OF CALCIUM: CPT | Mod: NTX

## 2024-01-01 PROCEDURE — 86977 RBC SERUM PRETX INCUBJ/INHIB: CPT | Performed by: NURSE PRACTITIONER

## 2024-01-01 PROCEDURE — C1768 GRAFT, VASCULAR: HCPCS | Mod: NTX | Performed by: THORACIC SURGERY (CARDIOTHORACIC VASCULAR SURGERY)

## 2024-01-01 PROCEDURE — 30233R1 TRANSFUSION OF NONAUTOLOGOUS PLATELETS INTO PERIPHERAL VEIN, PERCUTANEOUS APPROACH: ICD-10-PCS | Performed by: THORACIC SURGERY (CARDIOTHORACIC VASCULAR SURGERY)

## 2024-01-01 PROCEDURE — P9017 PLASMA 1 DONOR FRZ W/IN 8 HR: HCPCS | Mod: NTX

## 2024-01-01 PROCEDURE — 37799 UNLISTED PX VASCULAR SURGERY: CPT | Mod: NTX

## 2024-01-01 PROCEDURE — P9016 RBC LEUKOCYTES REDUCED: HCPCS | Mod: NTX | Performed by: THORACIC SURGERY (CARDIOTHORACIC VASCULAR SURGERY)

## 2024-01-01 PROCEDURE — 99900026 HC AIRWAY MAINTENANCE (STAT): Mod: NTX

## 2024-01-01 PROCEDURE — 99499 UNLISTED E&M SERVICE: CPT | Mod: S$PBB,NTX,, | Performed by: THORACIC SURGERY (CARDIOTHORACIC VASCULAR SURGERY)

## 2024-01-01 PROCEDURE — 5A1223Z PERFORMANCE OF CARDIAC PACING, CONTINUOUS: ICD-10-PCS | Performed by: THORACIC SURGERY (CARDIOTHORACIC VASCULAR SURGERY)

## 2024-01-01 PROCEDURE — 30233K1 TRANSFUSION OF NONAUTOLOGOUS FROZEN PLASMA INTO PERIPHERAL VEIN, PERCUTANEOUS APPROACH: ICD-10-PCS | Performed by: THORACIC SURGERY (CARDIOTHORACIC VASCULAR SURGERY)

## 2024-01-01 PROCEDURE — P9017 PLASMA 1 DONOR FRZ W/IN 8 HR: HCPCS | Mod: NTX | Performed by: THORACIC SURGERY (CARDIOTHORACIC VASCULAR SURGERY)

## 2024-01-01 PROCEDURE — 27100026 HC SHUNT SENSOR, TERUMO: Mod: NTX

## 2024-01-01 PROCEDURE — 85384 FIBRINOGEN ACTIVITY: CPT | Mod: 91,NTX | Performed by: ANESTHESIOLOGY

## 2024-01-01 PROCEDURE — 86833 HLA CLASS II HIGH DEFIN QUAL: CPT | Mod: 59 | Performed by: NURSE PRACTITIONER

## 2024-01-01 PROCEDURE — 84100 ASSAY OF PHOSPHORUS: CPT | Mod: 91,NTX | Performed by: THORACIC SURGERY (CARDIOTHORACIC VASCULAR SURGERY)

## 2024-01-01 PROCEDURE — 94729 DIFFUSING CAPACITY: CPT | Mod: PBBFAC,NTX | Performed by: INTERNAL MEDICINE

## 2024-01-01 PROCEDURE — 27201037 HC PRESSURE MONITORING SET UP: Mod: NTX

## 2024-01-01 PROCEDURE — 27200667 HC PACEMAKER, TEMPORARY MONITORING, PER SHIFT: Mod: NTX

## 2024-01-01 PROCEDURE — 63600175 PHARM REV CODE 636 W HCPCS: Mod: JG,NTX

## 2024-01-01 PROCEDURE — 27100171 HC OXYGEN HIGH FLOW UP TO 24 HOURS: Mod: NTX

## 2024-01-01 PROCEDURE — 86833 HLA CLASS II HIGH DEFIN QUAL: CPT | Performed by: NURSE PRACTITIONER

## 2024-01-01 PROCEDURE — 83735 ASSAY OF MAGNESIUM: CPT | Mod: 91,NTX | Performed by: STUDENT IN AN ORGANIZED HEALTH CARE EDUCATION/TRAINING PROGRAM

## 2024-01-01 PROCEDURE — P9017 PLASMA 1 DONOR FRZ W/IN 8 HR: HCPCS | Performed by: THORACIC SURGERY (CARDIOTHORACIC VASCULAR SURGERY)

## 2024-01-01 PROCEDURE — 5A1D90Z PERFORMANCE OF URINARY FILTRATION, CONTINUOUS, GREATER THAN 18 HOURS PER DAY: ICD-10-PCS | Performed by: INTERNAL MEDICINE

## 2024-01-01 PROCEDURE — P9037 PLATE PHERES LEUKOREDU IRRAD: HCPCS | Mod: NTX | Performed by: THORACIC SURGERY (CARDIOTHORACIC VASCULAR SURGERY)

## 2024-01-01 PROCEDURE — 93306 TTE W/DOPPLER COMPLETE: CPT | Mod: TXP

## 2024-01-01 PROCEDURE — 99999 PR PBB SHADOW E&M-EST. PATIENT-LVL III: CPT | Mod: PBBFAC,TXP,, | Performed by: THORACIC SURGERY (CARDIOTHORACIC VASCULAR SURGERY)

## 2024-01-01 PROCEDURE — P9016 RBC LEUKOCYTES REDUCED: HCPCS | Performed by: THORACIC SURGERY (CARDIOTHORACIC VASCULAR SURGERY)

## 2024-01-01 PROCEDURE — 84295 ASSAY OF SERUM SODIUM: CPT | Mod: NTX

## 2024-01-01 PROCEDURE — 33405 REPLACEMENT AORTIC VALVE OPN: CPT | Mod: 51,GC,, | Performed by: THORACIC SURGERY (CARDIOTHORACIC VASCULAR SURGERY)

## 2024-01-01 PROCEDURE — 86901 BLOOD TYPING SEROLOGIC RH(D): CPT | Mod: NTX | Performed by: PHYSICIAN ASSISTANT

## 2024-01-01 PROCEDURE — 27000175 HC ADULT BYPASS PUMP: Mod: NTX

## 2024-01-01 PROCEDURE — P9035 PLATELET PHERES LEUKOREDUCED: HCPCS | Mod: NTX | Performed by: THORACIC SURGERY (CARDIOTHORACIC VASCULAR SURGERY)

## 2024-01-01 PROCEDURE — 5A1221Z PERFORMANCE OF CARDIAC OUTPUT, CONTINUOUS: ICD-10-PCS | Performed by: THORACIC SURGERY (CARDIOTHORACIC VASCULAR SURGERY)

## 2024-01-01 PROCEDURE — L8670 VASCULAR GRAFT, SYNTHETIC: HCPCS | Mod: NTX | Performed by: THORACIC SURGERY (CARDIOTHORACIC VASCULAR SURGERY)

## 2024-01-01 PROCEDURE — 94003 VENT MGMT INPAT SUBQ DAY: CPT | Mod: NTX

## 2024-01-01 PROCEDURE — 27200953 HC CARDIOPLEGIA SYSTEM: Mod: NTX

## 2024-01-01 PROCEDURE — 33430 REPLACEMENT OF MITRAL VALVE: CPT | Mod: 51,GC,, | Performed by: THORACIC SURGERY (CARDIOTHORACIC VASCULAR SURGERY)

## 2024-01-01 PROCEDURE — P9012 CRYOPRECIPITATE EACH UNIT: HCPCS | Mod: NTX | Performed by: THORACIC SURGERY (CARDIOTHORACIC VASCULAR SURGERY)

## 2024-01-01 PROCEDURE — 25000003 PHARM REV CODE 250: Mod: NTX | Performed by: STUDENT IN AN ORGANIZED HEALTH CARE EDUCATION/TRAINING PROGRAM

## 2024-01-01 PROCEDURE — 25000003 PHARM REV CODE 250: Mod: NTX | Performed by: ANESTHESIOLOGY

## 2024-01-01 PROCEDURE — P9035 PLATELET PHERES LEUKOREDUCED: HCPCS

## 2024-01-01 PROCEDURE — 88305 TISSUE EXAM BY PATHOLOGIST: CPT | Performed by: PATHOLOGY

## 2024-01-01 PROCEDURE — P9035 PLATELET PHERES LEUKOREDUCED: HCPCS | Performed by: THORACIC SURGERY (CARDIOTHORACIC VASCULAR SURGERY)

## 2024-01-01 PROCEDURE — 93880 EXTRACRANIAL BILAT STUDY: CPT | Mod: PBBFAC,NTX | Performed by: SURGERY

## 2024-01-01 PROCEDURE — 85610 PROTHROMBIN TIME: CPT | Mod: NTX | Performed by: STUDENT IN AN ORGANIZED HEALTH CARE EDUCATION/TRAINING PROGRAM

## 2024-01-01 PROCEDURE — P9045 ALBUMIN (HUMAN), 5%, 250 ML: HCPCS | Mod: JZ,JG,NTX

## 2024-01-01 PROCEDURE — 36556 INSERT NON-TUNNEL CV CATH: CPT | Mod: 59,NTX,, | Performed by: ANESTHESIOLOGY

## 2024-01-01 PROCEDURE — 33530 CORONARY ARTERY BYPASS/REOP: CPT | Mod: 59,GC,, | Performed by: THORACIC SURGERY (CARDIOTHORACIC VASCULAR SURGERY)

## 2024-01-01 PROCEDURE — 36000713 HC OR TIME LEV V EA ADD 15 MIN: Mod: NTX | Performed by: THORACIC SURGERY (CARDIOTHORACIC VASCULAR SURGERY)

## 2024-01-01 PROCEDURE — 88300 SURGICAL PATH GROSS: CPT | Performed by: PATHOLOGY

## 2024-01-01 PROCEDURE — 5A2204Z RESTORATION OF CARDIAC RHYTHM, SINGLE: ICD-10-PCS | Performed by: THORACIC SURGERY (CARDIOTHORACIC VASCULAR SURGERY)

## 2024-01-01 PROCEDURE — P9047 ALBUMIN (HUMAN), 25%, 50ML: HCPCS | Mod: JZ,JG,NTX

## 2024-01-01 PROCEDURE — A4306 DRUG DELIVERY SYSTEM <=50 ML: HCPCS | Mod: NTX | Performed by: THORACIC SURGERY (CARDIOTHORACIC VASCULAR SURGERY)

## 2024-01-01 PROCEDURE — 85027 COMPLETE CBC AUTOMATED: CPT | Mod: NTX

## 2024-01-01 PROCEDURE — 63600175 PHARM REV CODE 636 W HCPCS: Mod: NTX | Performed by: PHYSICIAN ASSISTANT

## 2024-01-01 PROCEDURE — 02RG08Z REPLACEMENT OF MITRAL VALVE WITH ZOOPLASTIC TISSUE, OPEN APPROACH: ICD-10-PCS | Performed by: THORACIC SURGERY (CARDIOTHORACIC VASCULAR SURGERY)

## 2024-01-01 PROCEDURE — 99213 OFFICE O/P EST LOW 20 MIN: CPT | Mod: PBBFAC,25,TXP | Performed by: THORACIC SURGERY (CARDIOTHORACIC VASCULAR SURGERY)

## 2024-01-01 PROCEDURE — 86965 POOLING BLOOD PLATELETS: CPT | Performed by: THORACIC SURGERY (CARDIOTHORACIC VASCULAR SURGERY)

## 2024-01-01 PROCEDURE — P9016 RBC LEUKOCYTES REDUCED: HCPCS

## 2024-01-01 PROCEDURE — 27201041 HC RESERVOIR, CARDIOTOMY: Mod: NTX

## 2024-01-01 PROCEDURE — 93005 ELECTROCARDIOGRAM TRACING: CPT | Mod: PBBFAC,NTX | Performed by: INTERNAL MEDICINE

## 2024-01-01 PROCEDURE — 36592 COLLECT BLOOD FROM PICC: CPT | Mod: NTX

## 2024-01-01 PROCEDURE — 27800595 HC HEART VALVES: Mod: NTX

## 2024-01-01 PROCEDURE — 85025 COMPLETE CBC W/AUTO DIFF WBC: CPT | Mod: NTX

## 2024-01-01 PROCEDURE — 0WCC0ZZ EXTIRPATION OF MATTER FROM MEDIASTINUM, OPEN APPROACH: ICD-10-PCS | Performed by: THORACIC SURGERY (CARDIOTHORACIC VASCULAR SURGERY)

## 2024-01-01 PROCEDURE — 84132 ASSAY OF SERUM POTASSIUM: CPT | Mod: NTX | Performed by: PHYSICIAN ASSISTANT

## 2024-01-01 PROCEDURE — 27800903 OPTIME MED/SURG SUP & DEVICES OTHER IMPLANTS: Mod: NTX | Performed by: THORACIC SURGERY (CARDIOTHORACIC VASCULAR SURGERY)

## 2024-01-01 PROCEDURE — 27202608 HC CANNULA, MISC: Mod: NTX

## 2024-01-01 PROCEDURE — 86920 COMPATIBILITY TEST SPIN: CPT | Mod: NTX

## 2024-01-01 PROCEDURE — 71250 CT THORAX DX C-: CPT | Mod: 26,NTX,, | Performed by: STUDENT IN AN ORGANIZED HEALTH CARE EDUCATION/TRAINING PROGRAM

## 2024-01-01 PROCEDURE — 30233M1 TRANSFUSION OF NONAUTOLOGOUS PLASMA CRYOPRECIPITATE INTO PERIPHERAL VEIN, PERCUTANEOUS APPROACH: ICD-10-PCS | Performed by: THORACIC SURGERY (CARDIOTHORACIC VASCULAR SURGERY)

## 2024-01-01 PROCEDURE — 02QA0ZZ REPAIR HEART, OPEN APPROACH: ICD-10-PCS | Performed by: THORACIC SURGERY (CARDIOTHORACIC VASCULAR SURGERY)

## 2024-01-01 PROCEDURE — 82330 ASSAY OF CALCIUM: CPT | Mod: 91,NTX | Performed by: NURSE PRACTITIONER

## 2024-01-01 PROCEDURE — 84100 ASSAY OF PHOSPHORUS: CPT | Mod: NTX | Performed by: PHYSICIAN ASSISTANT

## 2024-01-01 PROCEDURE — 94640 AIRWAY INHALATION TREATMENT: CPT | Mod: NTX

## 2024-01-01 PROCEDURE — 80053 COMPREHEN METABOLIC PANEL: CPT | Mod: 91,NTX | Performed by: THORACIC SURGERY (CARDIOTHORACIC VASCULAR SURGERY)

## 2024-01-01 PROCEDURE — 35820 EXPLORE CHEST VESSELS: CPT | Mod: 78,GC,, | Performed by: THORACIC SURGERY (CARDIOTHORACIC VASCULAR SURGERY)

## 2024-01-01 PROCEDURE — 02RF08Z REPLACEMENT OF AORTIC VALVE WITH ZOOPLASTIC TISSUE, OPEN APPROACH: ICD-10-PCS | Performed by: THORACIC SURGERY (CARDIOTHORACIC VASCULAR SURGERY)

## 2024-01-01 PROCEDURE — 27100088 HC CELL SAVER: Mod: NTX

## 2024-01-01 PROCEDURE — 71046 X-RAY EXAM CHEST 2 VIEWS: CPT | Mod: 26,NTX,, | Performed by: RADIOLOGY

## 2024-01-01 PROCEDURE — 82803 BLOOD GASES ANY COMBINATION: CPT | Mod: NTX

## 2024-01-01 PROCEDURE — 99223 1ST HOSP IP/OBS HIGH 75: CPT | Mod: NTX,,, | Performed by: NURSE PRACTITIONER

## 2024-01-01 PROCEDURE — 20000000 HC ICU ROOM: Mod: NTX

## 2024-01-01 PROCEDURE — 99223 1ST HOSP IP/OBS HIGH 75: CPT | Mod: GC,NTX,, | Performed by: INTERNAL MEDICINE

## 2024-01-01 PROCEDURE — 86900 BLOOD TYPING SEROLOGIC ABO: CPT | Mod: NTX | Performed by: THORACIC SURGERY (CARDIOTHORACIC VASCULAR SURGERY)

## 2024-01-01 PROCEDURE — 90945 DIALYSIS ONE EVALUATION: CPT | Mod: NTX

## 2024-01-01 PROCEDURE — 86965 POOLING BLOOD PLATELETS: CPT | Mod: NTX | Performed by: THORACIC SURGERY (CARDIOTHORACIC VASCULAR SURGERY)

## 2024-01-01 PROCEDURE — P9012 CRYOPRECIPITATE EACH UNIT: HCPCS | Performed by: THORACIC SURGERY (CARDIOTHORACIC VASCULAR SURGERY)

## 2024-01-01 PROCEDURE — 86850 RBC ANTIBODY SCREEN: CPT | Mod: NTX | Performed by: PHYSICIAN ASSISTANT

## 2024-01-01 PROCEDURE — 63600175 PHARM REV CODE 636 W HCPCS: Mod: JZ,JG,NTX | Performed by: ANESTHESIOLOGY

## 2024-01-01 PROCEDURE — 25000003 PHARM REV CODE 250: Mod: NTX | Performed by: THORACIC SURGERY (CARDIOTHORACIC VASCULAR SURGERY)

## 2024-01-01 PROCEDURE — 88311 DECALCIFY TISSUE: CPT | Performed by: PATHOLOGY

## 2024-01-01 PROCEDURE — 85014 HEMATOCRIT: CPT | Mod: NTX

## 2024-01-01 PROCEDURE — 85520 HEPARIN ASSAY: CPT | Mod: NTX

## 2024-01-01 PROCEDURE — 36000712 HC OR TIME LEV V 1ST 15 MIN: Mod: NTX | Performed by: THORACIC SURGERY (CARDIOTHORACIC VASCULAR SURGERY)

## 2024-01-01 PROCEDURE — 63600175 PHARM REV CODE 636 W HCPCS: Mod: JG,NTX | Performed by: STUDENT IN AN ORGANIZED HEALTH CARE EDUCATION/TRAINING PROGRAM

## 2024-01-01 PROCEDURE — 84100 ASSAY OF PHOSPHORUS: CPT | Mod: NTX | Performed by: STUDENT IN AN ORGANIZED HEALTH CARE EDUCATION/TRAINING PROGRAM

## 2024-01-01 PROCEDURE — 86977 RBC SERUM PRETX INCUBJ/INHIB: CPT | Mod: 59 | Performed by: NURSE PRACTITIONER

## 2024-01-01 PROCEDURE — 86900 BLOOD TYPING SEROLOGIC ABO: CPT | Mod: NTX | Performed by: PHYSICIAN ASSISTANT

## 2024-01-01 PROCEDURE — 83735 ASSAY OF MAGNESIUM: CPT | Mod: NTX | Performed by: THORACIC SURGERY (CARDIOTHORACIC VASCULAR SURGERY)

## 2024-01-01 PROCEDURE — 94002 VENT MGMT INPAT INIT DAY: CPT | Mod: NTX

## 2024-01-01 PROCEDURE — 36000706: Mod: NTX | Performed by: THORACIC SURGERY (CARDIOTHORACIC VASCULAR SURGERY)

## 2024-01-01 PROCEDURE — 94010 BREATHING CAPACITY TEST: CPT | Mod: PBBFAC,NTX | Performed by: INTERNAL MEDICINE

## 2024-01-01 PROCEDURE — 27201598 HC CASSETTE, BLOOD WARMER: Mod: NTX

## 2024-01-01 PROCEDURE — 93325 DOPPLER ECHO COLOR FLOW MAPG: CPT | Mod: 26,NTX,, | Performed by: ANESTHESIOLOGY

## 2024-01-01 PROCEDURE — 27201015 HC HEMO-CONCENTRATOR: Mod: NTX

## 2024-01-01 PROCEDURE — 99900035 HC TECH TIME PER 15 MIN (STAT): Mod: NTX

## 2024-01-01 PROCEDURE — 63600175 PHARM REV CODE 636 W HCPCS: Mod: JZ,JG,NTX

## 2024-01-01 PROCEDURE — 83735 ASSAY OF MAGNESIUM: CPT | Mod: NTX | Performed by: PHYSICIAN ASSISTANT

## 2024-01-01 PROCEDURE — 02RX0JZ REPLACEMENT OF THORACIC AORTA, ASCENDING/ARCH WITH SYNTHETIC SUBSTITUTE, OPEN APPROACH: ICD-10-PCS | Performed by: THORACIC SURGERY (CARDIOTHORACIC VASCULAR SURGERY)

## 2024-01-01 PROCEDURE — A4216 STERILE WATER/SALINE, 10 ML: HCPCS | Mod: NTX

## 2024-01-01 PROCEDURE — 63600531 PHARM REV CODE 636 NO ALT 250 W HCPCS: Mod: JZ,JG,NTX

## 2024-01-01 PROCEDURE — 85025 COMPLETE CBC W/AUTO DIFF WBC: CPT | Mod: 91,NTX | Performed by: ANESTHESIOLOGY

## 2024-01-01 PROCEDURE — P9037 PLATE PHERES LEUKOREDU IRRAD: HCPCS

## 2024-01-01 PROCEDURE — 94729 DIFFUSING CAPACITY: CPT | Mod: 26,S$PBB,NTX, | Performed by: INTERNAL MEDICINE

## 2024-01-01 PROCEDURE — 93005 ELECTROCARDIOGRAM TRACING: CPT | Mod: NTX

## 2024-01-01 PROCEDURE — 82330 ASSAY OF CALCIUM: CPT | Mod: NTX | Performed by: STUDENT IN AN ORGANIZED HEALTH CARE EDUCATION/TRAINING PROGRAM

## 2024-01-01 PROCEDURE — 99233 SBSQ HOSP IP/OBS HIGH 50: CPT | Mod: GC,NTX,, | Performed by: STUDENT IN AN ORGANIZED HEALTH CARE EDUCATION/TRAINING PROGRAM

## 2024-01-01 PROCEDURE — 85025 COMPLETE CBC W/AUTO DIFF WBC: CPT | Mod: 91,NTX | Performed by: STUDENT IN AN ORGANIZED HEALTH CARE EDUCATION/TRAINING PROGRAM

## 2024-01-01 PROCEDURE — 93320 DOPPLER ECHO COMPLETE: CPT | Mod: 26,NTX,, | Performed by: ANESTHESIOLOGY

## 2024-01-01 PROCEDURE — 85384 FIBRINOGEN ACTIVITY: CPT | Mod: NTX | Performed by: STUDENT IN AN ORGANIZED HEALTH CARE EDUCATION/TRAINING PROGRAM

## 2024-01-01 PROCEDURE — 86901 BLOOD TYPING SEROLOGIC RH(D): CPT | Mod: NTX | Performed by: THORACIC SURGERY (CARDIOTHORACIC VASCULAR SURGERY)

## 2024-01-01 PROCEDURE — 83735 ASSAY OF MAGNESIUM: CPT | Mod: 91,NTX | Performed by: NURSE PRACTITIONER

## 2024-01-01 PROCEDURE — 86920 COMPATIBILITY TEST SPIN: CPT | Mod: NTX | Performed by: THORACIC SURGERY (CARDIOTHORACIC VASCULAR SURGERY)

## 2024-01-01 PROCEDURE — 85384 FIBRINOGEN ACTIVITY: CPT | Mod: NTX

## 2024-01-01 PROCEDURE — 85025 COMPLETE CBC W/AUTO DIFF WBC: CPT | Mod: 91,NTX | Performed by: PHYSICIAN ASSISTANT

## 2024-01-01 PROCEDURE — 27000191 HC C-V MONITORING: Mod: NTX

## 2024-01-01 PROCEDURE — 80069 RENAL FUNCTION PANEL: CPT | Mod: NTX | Performed by: STUDENT IN AN ORGANIZED HEALTH CARE EDUCATION/TRAINING PROGRAM

## 2024-01-01 PROCEDURE — 36000707: Mod: NTX | Performed by: THORACIC SURGERY (CARDIOTHORACIC VASCULAR SURGERY)

## 2024-01-01 PROCEDURE — 27000239 HC STAND-BY BYPASS PUMP: Mod: NTX

## 2024-01-01 PROCEDURE — 36415 COLL VENOUS BLD VENIPUNCTURE: CPT | Mod: NTX | Performed by: ANESTHESIOLOGY

## 2024-01-01 PROCEDURE — 86832 HLA CLASS I HIGH DEFIN QUAL: CPT | Mod: 59 | Performed by: NURSE PRACTITIONER

## 2024-01-01 PROCEDURE — 99292 CRITICAL CARE ADDL 30 MIN: CPT | Mod: GC,NTX,ICN, | Performed by: ANESTHESIOLOGY

## 2024-01-01 PROCEDURE — 85002 BLEEDING TIME TEST: CPT | Mod: NTX

## 2024-01-01 PROCEDURE — 27202415 HC CARTRIDGE, CRRT

## 2024-01-01 PROCEDURE — 36620 INSERTION CATHETER ARTERY: CPT | Mod: 59,NTX,, | Performed by: ANESTHESIOLOGY

## 2024-01-01 DEVICE — VALVE MOSAIC MITRAL CINCH 29MM: Type: IMPLANTABLE DEVICE | Site: HEART | Status: FUNCTIONAL

## 2024-01-01 DEVICE — BARD® PTFE FELT, 2.5 CM X 15.2 CM
Type: IMPLANTABLE DEVICE | Site: HEART | Status: FUNCTIONAL
Brand: BARD® PTFE FELT

## 2024-01-01 DEVICE — VALVE AVALUS AORTIC HEART 21MM: Type: IMPLANTABLE DEVICE | Site: HEART | Status: FUNCTIONAL

## 2024-01-01 DEVICE — PUTTY HEMASORB BONE RESORBABLE: Type: IMPLANTABLE DEVICE | Site: HEART | Status: FUNCTIONAL

## 2024-01-01 DEVICE — GRAFT 32 X 30 GELWEAVE WOVEN: Type: IMPLANTABLE DEVICE | Site: AORTA | Status: FUNCTIONAL

## 2024-01-01 RX ORDER — HYDROCODONE BITARTRATE AND ACETAMINOPHEN 500; 5 MG/1; MG/1
TABLET ORAL
Status: DISCONTINUED | OUTPATIENT
Start: 2024-01-01 | End: 2024-08-09 | Stop reason: HOSPADM

## 2024-01-01 RX ORDER — OXYCODONE HYDROCHLORIDE 5 MG/1
5 TABLET ORAL EVERY 4 HOURS PRN
Status: DISCONTINUED | OUTPATIENT
Start: 2024-01-01 | End: 2024-08-09 | Stop reason: HOSPADM

## 2024-01-01 RX ORDER — ASPIRIN 325 MG
325 TABLET ORAL DAILY
Status: DISCONTINUED | OUTPATIENT
Start: 2024-01-01 | End: 2024-01-01

## 2024-01-01 RX ORDER — HYDROCODONE BITARTRATE AND ACETAMINOPHEN 500; 5 MG/1; MG/1
TABLET ORAL
Status: DISCONTINUED | OUTPATIENT
Start: 2024-01-01 | End: 2024-01-01

## 2024-01-01 RX ORDER — MUPIROCIN 20 MG/G
1 OINTMENT TOPICAL
Status: CANCELLED | OUTPATIENT
Start: 2024-01-01

## 2024-01-01 RX ORDER — BISACODYL 10 MG/1
10 SUPPOSITORY RECTAL DAILY PRN
Status: CANCELLED | OUTPATIENT
Start: 2024-01-01

## 2024-01-01 RX ORDER — CEFAZOLIN SODIUM 1 G/3ML
INJECTION, POWDER, FOR SOLUTION INTRAMUSCULAR; INTRAVENOUS
Status: DISCONTINUED | OUTPATIENT
Start: 2024-01-01 | End: 2024-01-01

## 2024-01-01 RX ORDER — HYDROCODONE BITARTRATE AND ACETAMINOPHEN 500; 5 MG/1; MG/1
TABLET ORAL CONTINUOUS
Status: DISCONTINUED | OUTPATIENT
Start: 2024-01-01 | End: 2024-01-01 | Stop reason: HOSPADM

## 2024-01-01 RX ORDER — IPRATROPIUM BROMIDE AND ALBUTEROL SULFATE 2.5; .5 MG/3ML; MG/3ML
3 SOLUTION RESPIRATORY (INHALATION) EVERY 4 HOURS
Status: DISCONTINUED | OUTPATIENT
Start: 2024-01-01 | End: 2024-01-01

## 2024-01-01 RX ORDER — FENTANYL CITRATE 50 UG/ML
50 INJECTION, SOLUTION INTRAMUSCULAR; INTRAVENOUS
Status: DISCONTINUED | OUTPATIENT
Start: 2024-08-10 | End: 2024-08-09 | Stop reason: HOSPADM

## 2024-01-01 RX ORDER — HYDROCODONE BITARTRATE AND ACETAMINOPHEN 500; 5 MG/1; MG/1
TABLET ORAL CONTINUOUS
Status: DISCONTINUED | OUTPATIENT
Start: 2024-01-01 | End: 2024-01-01

## 2024-01-01 RX ORDER — POTASSIUM CHLORIDE 14.9 MG/ML
60 INJECTION INTRAVENOUS
Status: DISCONTINUED | OUTPATIENT
Start: 2024-01-01 | End: 2024-01-01

## 2024-01-01 RX ORDER — ALBUMIN HUMAN 50 G/1000ML
25 SOLUTION INTRAVENOUS ONCE AS NEEDED
Status: CANCELLED | OUTPATIENT
Start: 2024-01-01 | End: 2036-01-03

## 2024-01-01 RX ORDER — FENTANYL CITRATE 50 UG/ML
INJECTION, SOLUTION INTRAMUSCULAR; INTRAVENOUS
Status: DISCONTINUED | OUTPATIENT
Start: 2024-01-01 | End: 2024-01-01

## 2024-01-01 RX ORDER — INDOMETHACIN 25 MG/1
100 CAPSULE ORAL ONCE
Status: COMPLETED | OUTPATIENT
Start: 2024-01-01 | End: 2024-01-01

## 2024-01-01 RX ORDER — FENTANYL CITRATE 50 UG/ML
25 INJECTION, SOLUTION INTRAMUSCULAR; INTRAVENOUS
Status: CANCELLED | OUTPATIENT
Start: 2024-01-01

## 2024-01-01 RX ORDER — ATORVASTATIN CALCIUM 40 MG/1
40 TABLET, FILM COATED ORAL NIGHTLY
OUTPATIENT
Start: 2024-01-01

## 2024-01-01 RX ORDER — OXYCODONE HYDROCHLORIDE 10 MG/1
10 TABLET ORAL EVERY 4 HOURS PRN
Status: DISCONTINUED | OUTPATIENT
Start: 2024-01-01 | End: 2024-08-09 | Stop reason: HOSPADM

## 2024-01-01 RX ORDER — FENTANYL CITRATE 50 UG/ML
25 INJECTION, SOLUTION INTRAMUSCULAR; INTRAVENOUS
Status: CANCELLED | OUTPATIENT
Start: 2024-01-01 | End: 2024-01-01

## 2024-01-01 RX ORDER — CEFAZOLIN SODIUM 2 G/50ML
2 SOLUTION INTRAVENOUS
Status: CANCELLED | OUTPATIENT
Start: 2024-01-01 | End: 2024-01-01

## 2024-01-01 RX ORDER — PROPOFOL 10 MG/ML
0-50 INJECTION, EMULSION INTRAVENOUS CONTINUOUS
Status: CANCELLED | OUTPATIENT
Start: 2024-01-01

## 2024-01-01 RX ORDER — LIDOCAINE HYDROCHLORIDE 20 MG/ML
INJECTION, SOLUTION EPIDURAL; INFILTRATION; INTRACAUDAL; PERINEURAL
Status: DISCONTINUED | OUTPATIENT
Start: 2024-01-01 | End: 2024-01-01

## 2024-01-01 RX ORDER — PROPOFOL 10 MG/ML
VIAL (ML) INTRAVENOUS
Status: DISCONTINUED | OUTPATIENT
Start: 2024-01-01 | End: 2024-01-01

## 2024-01-01 RX ORDER — FAMOTIDINE 20 MG/1
20 TABLET, FILM COATED ORAL 2 TIMES DAILY
OUTPATIENT
Start: 2024-01-01

## 2024-01-01 RX ORDER — METOPROLOL TARTRATE 25 MG/1
25 TABLET, FILM COATED ORAL
Status: CANCELLED | OUTPATIENT
Start: 2024-01-01

## 2024-01-01 RX ORDER — ASPIRIN 325 MG
325 TABLET, DELAYED RELEASE (ENTERIC COATED) ORAL DAILY
OUTPATIENT
Start: 2024-01-01

## 2024-01-01 RX ORDER — FENTANYL CITRATE 50 UG/ML
50 INJECTION, SOLUTION INTRAMUSCULAR; INTRAVENOUS
Status: CANCELLED | OUTPATIENT
Start: 2024-08-09

## 2024-01-01 RX ORDER — POTASSIUM CHLORIDE 14.9 MG/ML
20 INJECTION INTRAVENOUS
Status: DISCONTINUED | OUTPATIENT
Start: 2024-01-01 | End: 2024-01-01

## 2024-01-01 RX ORDER — FENTANYL CITRATE 50 UG/ML
25 INJECTION, SOLUTION INTRAMUSCULAR; INTRAVENOUS
Status: DISCONTINUED | OUTPATIENT
Start: 2024-01-01 | End: 2024-08-09 | Stop reason: HOSPADM

## 2024-01-01 RX ORDER — GLUCAGON 1 MG
1 KIT INJECTION
Status: DISCONTINUED | OUTPATIENT
Start: 2024-01-01 | End: 2024-08-09 | Stop reason: HOSPADM

## 2024-01-01 RX ORDER — MAGNESIUM SULFATE HEPTAHYDRATE 40 MG/ML
4 INJECTION, SOLUTION INTRAVENOUS
Status: DISCONTINUED | OUTPATIENT
Start: 2024-01-01 | End: 2024-01-01

## 2024-01-01 RX ORDER — IPRATROPIUM BROMIDE AND ALBUTEROL SULFATE 2.5; .5 MG/3ML; MG/3ML
3 SOLUTION RESPIRATORY (INHALATION) EVERY 4 HOURS PRN
Status: CANCELLED | OUTPATIENT
Start: 2024-01-01 | End: 2024-01-01

## 2024-01-01 RX ORDER — NOREPINEPHRINE BITARTRATE/D5W 8 MG/250ML
0-3 PLASTIC BAG, INJECTION (ML) INTRAVENOUS CONTINUOUS
Status: DISCONTINUED | OUTPATIENT
Start: 2024-01-01 | End: 2024-01-01

## 2024-01-01 RX ORDER — ALBUMIN HUMAN 50 G/1000ML
25 SOLUTION INTRAVENOUS ONCE
Status: COMPLETED | OUTPATIENT
Start: 2024-01-01 | End: 2024-01-01

## 2024-01-01 RX ORDER — LIDOCAINE HYDROCHLORIDE 10 MG/ML
1 INJECTION, SOLUTION EPIDURAL; INFILTRATION; INTRACAUDAL; PERINEURAL
Status: CANCELLED | OUTPATIENT
Start: 2024-01-01

## 2024-01-01 RX ORDER — METOPROLOL TARTRATE 25 MG/1
25 TABLET, FILM COATED ORAL
Status: DISCONTINUED | OUTPATIENT
Start: 2024-01-01 | End: 2024-01-01

## 2024-01-01 RX ORDER — PROPOFOL 10 MG/ML
0-50 INJECTION, EMULSION INTRAVENOUS CONTINUOUS
Status: DISCONTINUED | OUTPATIENT
Start: 2024-01-01 | End: 2024-01-01 | Stop reason: HOSPADM

## 2024-01-01 RX ORDER — INDOMETHACIN 25 MG/1
50 CAPSULE ORAL ONCE
Status: COMPLETED | OUTPATIENT
Start: 2024-01-01 | End: 2024-01-01

## 2024-01-01 RX ORDER — OXYCODONE HYDROCHLORIDE 5 MG/1
5 TABLET ORAL EVERY 4 HOURS PRN
Status: CANCELLED | OUTPATIENT
Start: 2024-01-01

## 2024-01-01 RX ORDER — POTASSIUM CHLORIDE 29.8 MG/ML
40 INJECTION INTRAVENOUS
Status: CANCELLED | OUTPATIENT
Start: 2024-01-01

## 2024-01-01 RX ORDER — MAGNESIUM SULFATE/D5W 2 G/50 ML
4 INTRAVENOUS SOLUTION, PIGGYBACK (ML) INTRAVENOUS
Status: CANCELLED | OUTPATIENT
Start: 2024-01-01

## 2024-01-01 RX ORDER — ONDANSETRON HYDROCHLORIDE 2 MG/ML
4 INJECTION, SOLUTION INTRAVENOUS EVERY 12 HOURS PRN
Status: DISCONTINUED | OUTPATIENT
Start: 2024-01-01 | End: 2024-08-09 | Stop reason: HOSPADM

## 2024-01-01 RX ORDER — HEPARIN SODIUM 1000 [USP'U]/ML
INJECTION, SOLUTION INTRAVENOUS; SUBCUTANEOUS
Status: DISCONTINUED | OUTPATIENT
Start: 2024-01-01 | End: 2024-01-01

## 2024-01-01 RX ORDER — ONDANSETRON 2 MG/ML
INJECTION INTRAMUSCULAR; INTRAVENOUS
Status: DISCONTINUED | OUTPATIENT
Start: 2024-01-01 | End: 2024-01-01

## 2024-01-01 RX ORDER — ROCURONIUM BROMIDE 10 MG/ML
INJECTION, SOLUTION INTRAVENOUS
Status: DISCONTINUED | OUTPATIENT
Start: 2024-01-01 | End: 2024-01-01

## 2024-01-01 RX ORDER — EPINEPHRINE 0.1 MG/ML
INJECTION INTRAVENOUS
Status: DISCONTINUED | OUTPATIENT
Start: 2024-01-01 | End: 2024-01-01

## 2024-01-01 RX ORDER — NAPROXEN SODIUM 220 MG/1
81 TABLET, FILM COATED ORAL ONCE
Status: DISCONTINUED | OUTPATIENT
Start: 2024-01-01 | End: 2024-01-01

## 2024-01-01 RX ORDER — POLYETHYLENE GLYCOL 3350 17 G/17G
17 POWDER, FOR SOLUTION ORAL DAILY
Status: DISCONTINUED | OUTPATIENT
Start: 2024-01-01 | End: 2024-01-01 | Stop reason: HOSPADM

## 2024-01-01 RX ORDER — INDOMETHACIN 25 MG/1
CAPSULE ORAL
Status: COMPLETED
Start: 2024-01-01 | End: 2024-01-01

## 2024-01-01 RX ORDER — DEXTROSE MONOHYDRATE, SODIUM CHLORIDE, AND POTASSIUM CHLORIDE 50; 1.49; 4.5 G/1000ML; G/1000ML; G/1000ML
INJECTION, SOLUTION INTRAVENOUS CONTINUOUS
Status: DISCONTINUED | OUTPATIENT
Start: 2024-01-01 | End: 2024-01-01 | Stop reason: HOSPADM

## 2024-01-01 RX ORDER — ASPIRIN 300 MG/1
300 SUPPOSITORY RECTAL ONCE AS NEEDED
Status: CANCELLED | OUTPATIENT
Start: 2024-01-01 | End: 2036-01-03

## 2024-01-01 RX ORDER — SODIUM CHLORIDE 9 MG/ML
INJECTION, SOLUTION INTRAVENOUS CONTINUOUS
Status: DISCONTINUED | OUTPATIENT
Start: 2024-01-01 | End: 2024-01-01 | Stop reason: HOSPADM

## 2024-01-01 RX ORDER — SODIUM CHLORIDE 9 MG/ML
INJECTION, SOLUTION INTRAVENOUS CONTINUOUS
Status: CANCELLED | OUTPATIENT
Start: 2024-01-01

## 2024-01-01 RX ORDER — METOCLOPRAMIDE HYDROCHLORIDE 5 MG/ML
5 INJECTION INTRAMUSCULAR; INTRAVENOUS EVERY 6 HOURS PRN
Status: DISCONTINUED | OUTPATIENT
Start: 2024-01-01 | End: 2024-08-09 | Stop reason: HOSPADM

## 2024-01-01 RX ORDER — LIDOCAINE HYDROCHLORIDE 10 MG/ML
1 INJECTION, SOLUTION EPIDURAL; INFILTRATION; INTRACAUDAL; PERINEURAL
Status: DISCONTINUED | OUTPATIENT
Start: 2024-01-01 | End: 2024-01-01 | Stop reason: HOSPADM

## 2024-01-01 RX ORDER — INSULIN ASPART 100 [IU]/ML
0-10 INJECTION, SOLUTION INTRAVENOUS; SUBCUTANEOUS EVERY 4 HOURS PRN
Status: DISCONTINUED | OUTPATIENT
Start: 2024-01-01 | End: 2024-08-09 | Stop reason: HOSPADM

## 2024-01-01 RX ORDER — MIDAZOLAM HYDROCHLORIDE 1 MG/ML
INJECTION INTRAMUSCULAR; INTRAVENOUS
Status: DISCONTINUED | OUTPATIENT
Start: 2024-01-01 | End: 2024-01-01

## 2024-01-01 RX ORDER — NAPROXEN SODIUM 220 MG/1
81 TABLET, FILM COATED ORAL DAILY
Status: CANCELLED | OUTPATIENT
Start: 2024-01-01

## 2024-01-01 RX ORDER — MUPIROCIN 20 MG/G
1 OINTMENT TOPICAL
Status: COMPLETED | OUTPATIENT
Start: 2024-01-01 | End: 2024-01-01

## 2024-01-01 RX ORDER — MAGNESIUM SULFATE HEPTAHYDRATE 40 MG/ML
2 INJECTION, SOLUTION INTRAVENOUS
Status: DISCONTINUED | OUTPATIENT
Start: 2024-01-01 | End: 2024-01-01

## 2024-01-01 RX ORDER — MUPIROCIN 20 MG/G
1 OINTMENT TOPICAL 2 TIMES DAILY
Status: DISCONTINUED | OUTPATIENT
Start: 2024-01-01 | End: 2024-01-01 | Stop reason: HOSPADM

## 2024-01-01 RX ORDER — FAMOTIDINE 10 MG/ML
20 INJECTION INTRAVENOUS DAILY
Status: DISCONTINUED | OUTPATIENT
Start: 2024-01-01 | End: 2024-01-01 | Stop reason: HOSPADM

## 2024-01-01 RX ORDER — SODIUM CHLORIDE 9 MG/ML
INJECTION, SOLUTION INTRAVENOUS
Status: DISCONTINUED | OUTPATIENT
Start: 2024-01-01 | End: 2024-08-09 | Stop reason: HOSPADM

## 2024-01-01 RX ORDER — DEXTROSE MONOHYDRATE, SODIUM CHLORIDE, AND POTASSIUM CHLORIDE 50; 1.49; 4.5 G/1000ML; G/1000ML; G/1000ML
INJECTION, SOLUTION INTRAVENOUS CONTINUOUS
Status: CANCELLED | OUTPATIENT
Start: 2024-01-01

## 2024-01-01 RX ORDER — TRANEXAMIC ACID 100 MG/ML
INJECTION, SOLUTION INTRAVENOUS
Status: DISCONTINUED | OUTPATIENT
Start: 2024-01-01 | End: 2024-01-01

## 2024-01-01 RX ORDER — SODIUM CHLORIDE 0.9 % (FLUSH) 0.9 %
10 SYRINGE (ML) INJECTION
Status: DISCONTINUED | OUTPATIENT
Start: 2024-01-01 | End: 2024-08-09 | Stop reason: HOSPADM

## 2024-01-01 RX ORDER — IPRATROPIUM BROMIDE AND ALBUTEROL SULFATE 2.5; .5 MG/3ML; MG/3ML
3 SOLUTION RESPIRATORY (INHALATION) EVERY 4 HOURS
Status: CANCELLED | OUTPATIENT
Start: 2024-01-01 | End: 2024-01-01

## 2024-01-01 RX ORDER — MAGNESIUM SULFATE HEPTAHYDRATE 40 MG/ML
2 INJECTION, SOLUTION INTRAVENOUS
Status: CANCELLED | OUTPATIENT
Start: 2024-01-01

## 2024-01-01 RX ORDER — METHYLENE BLUE 5 MG/ML
2 INJECTION INTRAVENOUS ONCE
Status: DISCONTINUED | OUTPATIENT
Start: 2024-01-01 | End: 2024-01-01

## 2024-01-01 RX ORDER — BISACODYL 10 MG/1
10 SUPPOSITORY RECTAL DAILY PRN
Status: DISCONTINUED | OUTPATIENT
Start: 2024-01-01 | End: 2024-08-09 | Stop reason: HOSPADM

## 2024-01-01 RX ORDER — IPRATROPIUM BROMIDE AND ALBUTEROL SULFATE 2.5; .5 MG/3ML; MG/3ML
3 SOLUTION RESPIRATORY (INHALATION) EVERY 4 HOURS PRN
Status: DISCONTINUED | OUTPATIENT
Start: 2024-01-01 | End: 2024-01-01

## 2024-01-01 RX ORDER — KETAMINE HCL IN 0.9 % NACL 50 MG/5 ML
SYRINGE (ML) INTRAVENOUS
Status: DISCONTINUED | OUTPATIENT
Start: 2024-01-01 | End: 2024-01-01

## 2024-01-01 RX ORDER — METOCLOPRAMIDE HYDROCHLORIDE 5 MG/ML
5 INJECTION INTRAMUSCULAR; INTRAVENOUS EVERY 6 HOURS PRN
Status: CANCELLED | OUTPATIENT
Start: 2024-01-01

## 2024-01-01 RX ORDER — NAPROXEN SODIUM 220 MG/1
81 TABLET, FILM COATED ORAL ONCE
Status: CANCELLED | OUTPATIENT
Start: 2024-01-01 | End: 2024-01-01

## 2024-01-01 RX ORDER — POLYETHYLENE GLYCOL 3350 17 G/17G
17 POWDER, FOR SOLUTION ORAL DAILY
Status: CANCELLED | OUTPATIENT
Start: 2024-01-01

## 2024-01-01 RX ORDER — ACETAMINOPHEN 325 MG/1
650 TABLET ORAL EVERY 4 HOURS PRN
Status: CANCELLED | OUTPATIENT
Start: 2024-01-01

## 2024-01-01 RX ORDER — CALCIUM CHLORIDE INJECTION 100 MG/ML
1 INJECTION, SOLUTION INTRAVENOUS ONCE
Status: COMPLETED | OUTPATIENT
Start: 2024-01-01 | End: 2024-01-01

## 2024-01-01 RX ORDER — CEFAZOLIN SODIUM 2 G/50ML
2 SOLUTION INTRAVENOUS
Status: CANCELLED | OUTPATIENT
Start: 2024-01-01

## 2024-01-01 RX ORDER — VASOPRESSIN 20 [USP'U]/ML
INJECTION, SOLUTION INTRAMUSCULAR; SUBCUTANEOUS
Status: DISCONTINUED | OUTPATIENT
Start: 2024-01-01 | End: 2024-01-01

## 2024-01-01 RX ORDER — OXYCODONE HYDROCHLORIDE 10 MG/1
10 TABLET ORAL EVERY 4 HOURS PRN
Status: CANCELLED | OUTPATIENT
Start: 2024-01-01

## 2024-01-01 RX ORDER — SODIUM CHLORIDE 0.9 % (FLUSH) 0.9 %
10 SYRINGE (ML) INJECTION
Status: CANCELLED | OUTPATIENT
Start: 2024-01-01

## 2024-01-01 RX ORDER — FUROSEMIDE 10 MG/ML
120 INJECTION INTRAMUSCULAR; INTRAVENOUS ONCE
Status: COMPLETED | OUTPATIENT
Start: 2024-01-01 | End: 2024-01-01

## 2024-01-01 RX ORDER — NOREPINEPHRINE BITARTRATE/D5W 4MG/250ML
0-3 PLASTIC BAG, INJECTION (ML) INTRAVENOUS CONTINUOUS
Status: DISCONTINUED | OUTPATIENT
Start: 2024-01-01 | End: 2024-01-01

## 2024-01-01 RX ORDER — EPINEPHRINE 1 MG/ML
INJECTION, SOLUTION, CONCENTRATE INTRAVENOUS
Status: DISCONTINUED | OUTPATIENT
Start: 2024-01-01 | End: 2024-01-01

## 2024-01-01 RX ORDER — TRANEXAMIC ACID 100 MG/ML
INJECTION, SOLUTION INTRAVENOUS CONTINUOUS PRN
Status: DISCONTINUED | OUTPATIENT
Start: 2024-01-01 | End: 2024-01-01

## 2024-01-01 RX ORDER — NAPROXEN SODIUM 220 MG/1
81 TABLET, FILM COATED ORAL DAILY
Status: DISCONTINUED | OUTPATIENT
Start: 2024-01-01 | End: 2024-01-01

## 2024-01-01 RX ORDER — ACETAMINOPHEN 325 MG/1
650 TABLET ORAL EVERY 4 HOURS PRN
Status: DISCONTINUED | OUTPATIENT
Start: 2024-01-01 | End: 2024-01-01

## 2024-01-01 RX ORDER — POTASSIUM CHLORIDE 29.8 MG/ML
40 INJECTION INTRAVENOUS
Status: DISCONTINUED | OUTPATIENT
Start: 2024-01-01 | End: 2024-01-01

## 2024-01-01 RX ORDER — DOCUSATE SODIUM 100 MG/1
100 CAPSULE, LIQUID FILLED ORAL 2 TIMES DAILY
Status: CANCELLED | OUTPATIENT
Start: 2024-01-01

## 2024-01-01 RX ORDER — MUPIROCIN 20 MG/G
1 OINTMENT TOPICAL 2 TIMES DAILY
Status: CANCELLED | OUTPATIENT
Start: 2024-01-01 | End: 2024-08-11

## 2024-01-01 RX ORDER — DOCUSATE SODIUM 100 MG/1
100 CAPSULE, LIQUID FILLED ORAL 2 TIMES DAILY
Status: DISCONTINUED | OUTPATIENT
Start: 2024-01-01 | End: 2024-01-01 | Stop reason: HOSPADM

## 2024-01-01 RX ORDER — NOREPINEPHRINE BITARTRATE 1 MG/ML
INJECTION, SOLUTION INTRAVENOUS
Status: DISCONTINUED | OUTPATIENT
Start: 2024-01-01 | End: 2024-01-01

## 2024-01-01 RX ORDER — FAMOTIDINE 10 MG/ML
20 INJECTION INTRAVENOUS 2 TIMES DAILY
Status: CANCELLED | OUTPATIENT
Start: 2024-01-01

## 2024-01-01 RX ORDER — POTASSIUM CHLORIDE 14.9 MG/ML
60 INJECTION INTRAVENOUS
Status: CANCELLED | OUTPATIENT
Start: 2024-01-01

## 2024-01-01 RX ORDER — INDOMETHACIN 25 MG/1
CAPSULE ORAL
Status: DISCONTINUED
Start: 2024-01-01 | End: 2024-08-09 | Stop reason: HOSPADM

## 2024-01-01 RX ORDER — ONDANSETRON HYDROCHLORIDE 2 MG/ML
4 INJECTION, SOLUTION INTRAVENOUS EVERY 12 HOURS PRN
Status: CANCELLED | OUTPATIENT
Start: 2024-01-01

## 2024-01-01 RX ORDER — ASPIRIN 300 MG/1
300 SUPPOSITORY RECTAL ONCE AS NEEDED
Status: DISCONTINUED | OUTPATIENT
Start: 2024-01-01 | End: 2024-01-01

## 2024-01-01 RX ORDER — ALBUMIN HUMAN 50 G/1000ML
25 SOLUTION INTRAVENOUS ONCE AS NEEDED
Status: DISCONTINUED | OUTPATIENT
Start: 2024-01-01 | End: 2024-08-09 | Stop reason: HOSPADM

## 2024-01-01 RX ORDER — INDOMETHACIN 25 MG/1
50 CAPSULE ORAL ONCE
Status: DISCONTINUED | OUTPATIENT
Start: 2024-01-01 | End: 2024-01-01 | Stop reason: HOSPADM

## 2024-01-01 RX ORDER — PROTAMINE SULFATE 10 MG/ML
INJECTION, SOLUTION INTRAVENOUS
Status: DISCONTINUED | OUTPATIENT
Start: 2024-01-01 | End: 2024-01-01

## 2024-01-01 RX ORDER — ONDANSETRON 2 MG/ML
1 INJECTION INTRAMUSCULAR; INTRAVENOUS ONCE
Status: COMPLETED | OUTPATIENT
Start: 2024-01-01 | End: 2024-01-01

## 2024-01-01 RX ORDER — POTASSIUM CHLORIDE 14.9 MG/ML
20 INJECTION INTRAVENOUS
Status: CANCELLED | OUTPATIENT
Start: 2024-01-01

## 2024-01-01 RX ORDER — ALBUMIN HUMAN 250 G/1000ML
25 SOLUTION INTRAVENOUS ONCE
Status: COMPLETED | OUTPATIENT
Start: 2024-01-01 | End: 2024-01-01

## 2024-01-01 RX ORDER — MAGNESIUM SULFATE HEPTAHYDRATE 40 MG/ML
2 INJECTION, SOLUTION INTRAVENOUS
Status: DISCONTINUED | OUTPATIENT
Start: 2024-01-01 | End: 2024-01-01 | Stop reason: HOSPADM

## 2024-01-01 RX ADMIN — SODIUM BICARBONATE: 84 INJECTION, SOLUTION INTRAVENOUS at 02:08

## 2024-01-01 RX ADMIN — CALCIUM CHLORIDE 1 G: 100 INJECTION, SOLUTION INTRAVENOUS at 04:08

## 2024-01-01 RX ADMIN — NOREPINEPHRINE BITARTRATE 0.2 MCG/KG/MIN: 1 INJECTION, SOLUTION, CONCENTRATE INTRAVENOUS at 11:08

## 2024-01-01 RX ADMIN — HYDROXOCOBALAMIN 5 G: 5 INJECTION, POWDER, LYOPHILIZED, FOR SOLUTION INTRAVENOUS at 07:08

## 2024-01-01 RX ADMIN — FENTANYL CITRATE 200 MCG: 50 INJECTION, SOLUTION INTRAMUSCULAR; INTRAVENOUS at 08:08

## 2024-01-01 RX ADMIN — NOREPINEPHRINE BITARTRATE 0.18 MCG/KG/MIN: 4 INJECTION, SOLUTION INTRAVENOUS at 09:08

## 2024-01-01 RX ADMIN — SODIUM BICARBONATE 50 MEQ: 84 INJECTION, SOLUTION INTRAVENOUS at 03:08

## 2024-01-01 RX ADMIN — SODIUM BICARBONATE: 84 INJECTION, SOLUTION INTRAVENOUS at 07:08

## 2024-01-01 RX ADMIN — ROCURONIUM BROMIDE 50 MG: 10 INJECTION, SOLUTION INTRAVENOUS at 06:08

## 2024-01-01 RX ADMIN — ROCURONIUM BROMIDE 100 MG: 10 INJECTION, SOLUTION INTRAVENOUS at 02:08

## 2024-01-01 RX ADMIN — Medication 2028 UNITS: at 01:08

## 2024-01-01 RX ADMIN — EPINEPHRINE 0.2 MCG/KG/MIN: 1 INJECTION INTRAMUSCULAR; INTRAVENOUS; SUBCUTANEOUS at 10:08

## 2024-01-01 RX ADMIN — EPINEPHRINE 1 MCG: 0.1 INJECTION INTRAVENOUS at 05:08

## 2024-01-01 RX ADMIN — ONDANSETRON 2 G: 2 INJECTION INTRAMUSCULAR; INTRAVENOUS at 05:08

## 2024-01-01 RX ADMIN — MUPIROCIN 1 G: 20 OINTMENT TOPICAL at 08:08

## 2024-01-01 RX ADMIN — SODIUM BICARBONATE 50 MEQ: 84 INJECTION, SOLUTION INTRAVENOUS at 01:08

## 2024-01-01 RX ADMIN — VASOPRESSIN 0.08 UNITS/MIN: 20 INJECTION INTRAVENOUS at 09:08

## 2024-01-01 RX ADMIN — ALBUMIN (HUMAN) 25 G: 12.5 SOLUTION INTRAVENOUS at 05:08

## 2024-01-01 RX ADMIN — NOREPINEPHRINE BITARTRATE 0.1 MCG/KG/MIN: 1 INJECTION, SOLUTION, CONCENTRATE INTRAVENOUS at 03:08

## 2024-01-01 RX ADMIN — NOREPINEPHRINE BITARTRATE 0.5 MCG/KG/MIN: 8 INJECTION, SOLUTION INTRAVENOUS at 08:08

## 2024-01-01 RX ADMIN — EPINEPHRINE 1 MCG/KG/MIN: 1 INJECTION INTRAMUSCULAR; INTRAVENOUS; SUBCUTANEOUS at 06:08

## 2024-01-01 RX ADMIN — ROCURONIUM BROMIDE 50 MG: 10 INJECTION, SOLUTION INTRAVENOUS at 09:08

## 2024-01-01 RX ADMIN — SODIUM CHLORIDE, SODIUM GLUCONATE, SODIUM ACETATE, POTASSIUM CHLORIDE, MAGNESIUM CHLORIDE, SODIUM PHOSPHATE, DIBASIC, AND POTASSIUM PHOSPHATE: .53; .5; .37; .037; .03; .012; .00082 INJECTION, SOLUTION INTRAVENOUS at 02:08

## 2024-01-01 RX ADMIN — NOREPINEPHRINE BITARTRATE 0.04 MCG/KG/MIN: 1 INJECTION, SOLUTION, CONCENTRATE INTRAVENOUS at 09:08

## 2024-01-01 RX ADMIN — VASOPRESSIN 0.04 UNITS/MIN: 20 INJECTION INTRAVENOUS at 10:08

## 2024-01-01 RX ADMIN — ONDANSETRON 1 G: 2 INJECTION INTRAMUSCULAR; INTRAVENOUS at 04:08

## 2024-01-01 RX ADMIN — SODIUM CHLORIDE: 9 INJECTION, SOLUTION INTRAVENOUS at 10:08

## 2024-01-01 RX ADMIN — NOREPINEPHRINE BITARTRATE 1200 MCG: 1 INJECTION, SOLUTION, CONCENTRATE INTRAVENOUS at 05:08

## 2024-01-01 RX ADMIN — CALCIUM CHLORIDE 1 G: 100 INJECTION INTRAVENOUS; INTRAVENTRICULAR at 09:08

## 2024-01-01 RX ADMIN — CALCIUM CHLORIDE 1 G: 100 INJECTION, SOLUTION INTRAVENOUS at 08:08

## 2024-01-01 RX ADMIN — IPRATROPIUM BROMIDE AND ALBUTEROL SULFATE 3 ML: 2.5; .5 SOLUTION RESPIRATORY (INHALATION) at 11:08

## 2024-01-01 RX ADMIN — HYDROCORTISONE SODIUM SUCCINATE 100 MG: 100 INJECTION, POWDER, FOR SOLUTION INTRAMUSCULAR; INTRAVENOUS at 06:08

## 2024-01-01 RX ADMIN — NOREPINEPHRINE BITARTRATE 0.8 MCG/KG/MIN: 8 INJECTION, SOLUTION INTRAVENOUS at 06:08

## 2024-01-01 RX ADMIN — ALBUMIN (HUMAN) 25 G: 12.5 SOLUTION INTRAVENOUS at 01:08

## 2024-01-01 RX ADMIN — CALCIUM CHLORIDE INJECTION 1 G: 100 INJECTION, SOLUTION INTRAVENOUS at 06:08

## 2024-01-01 RX ADMIN — ALBUMIN (HUMAN) 25 G: 12.5 SOLUTION INTRAVENOUS at 04:08

## 2024-01-01 RX ADMIN — CALCIUM CHLORIDE 1 G: 100 INJECTION, SOLUTION INTRAVENOUS at 06:08

## 2024-01-01 RX ADMIN — SODIUM CHLORIDE 4 UNITS/HR: 9 INJECTION, SOLUTION INTRAVENOUS at 12:08

## 2024-01-01 RX ADMIN — VASOPRESSIN 2 UNITS: 20 INJECTION INTRAVENOUS at 08:08

## 2024-01-01 RX ADMIN — NOREPINEPHRINE BITARTRATE 0.3 MCG/KG/MIN: 8 INJECTION, SOLUTION INTRAVENOUS at 12:08

## 2024-01-01 RX ADMIN — EPINEPHRINE 1000 MCG: 1 INJECTION, SOLUTION, CONCENTRATE INTRAVENOUS at 02:08

## 2024-01-01 RX ADMIN — ONDANSETRON 4 G: 2 INJECTION INTRAMUSCULAR; INTRAVENOUS at 05:08

## 2024-01-01 RX ADMIN — Medication 50 MG: at 08:08

## 2024-01-01 RX ADMIN — FENTANYL CITRATE 100 MCG: 50 INJECTION, SOLUTION INTRAMUSCULAR; INTRAVENOUS at 09:08

## 2024-01-01 RX ADMIN — CALCIUM CHLORIDE 1 G: 100 INJECTION INTRAVENOUS; INTRAVENTRICULAR at 01:08

## 2024-01-01 RX ADMIN — HUMAN INSULIN 10 UNITS: 100 INJECTION, SOLUTION SUBCUTANEOUS at 04:08

## 2024-01-01 RX ADMIN — NOREPINEPHRINE BITARTRATE 0.42 MCG/KG/MIN: 8 INJECTION, SOLUTION INTRAVENOUS at 04:08

## 2024-01-01 RX ADMIN — TRANEXAMIC ACID 1000 MG: 100 INJECTION INTRAVENOUS at 05:08

## 2024-01-01 RX ADMIN — CALCIUM CHLORIDE INJECTION 1 G: 100 INJECTION, SOLUTION INTRAVENOUS at 10:08

## 2024-01-01 RX ADMIN — CALCIUM CHLORIDE 2 G: 100 INJECTION, SOLUTION INTRAVENOUS at 01:08

## 2024-01-01 RX ADMIN — ROCURONIUM BROMIDE 100 MG: 10 INJECTION, SOLUTION INTRAVENOUS at 08:08

## 2024-01-01 RX ADMIN — IPRATROPIUM BROMIDE AND ALBUTEROL SULFATE 3 ML: 2.5; .5 SOLUTION RESPIRATORY (INHALATION) at 07:08

## 2024-01-01 RX ADMIN — ROCURONIUM BROMIDE 50 MG: 10 INJECTION, SOLUTION INTRAVENOUS at 01:08

## 2024-01-01 RX ADMIN — MIDAZOLAM HYDROCHLORIDE 2 MG: 1 INJECTION, SOLUTION INTRAMUSCULAR; INTRAVENOUS at 01:08

## 2024-01-01 RX ADMIN — EPINEPHRINE 1 MCG: 0.1 INJECTION INTRAVENOUS at 04:08

## 2024-01-01 RX ADMIN — DEXTROSE MONOHYDRATE 125 ML: 100 INJECTION, SOLUTION INTRAVENOUS at 04:08

## 2024-01-01 RX ADMIN — SODIUM BICARBONATE 100 MEQ: 84 INJECTION, SOLUTION INTRAVENOUS at 10:08

## 2024-01-01 RX ADMIN — SODIUM CHLORIDE, SODIUM GLUCONATE, SODIUM ACETATE, POTASSIUM CHLORIDE, MAGNESIUM CHLORIDE, SODIUM PHOSPHATE, DIBASIC, AND POTASSIUM PHOSPHATE: .53; .5; .37; .037; .03; .012; .00082 INJECTION, SOLUTION INTRAVENOUS at 04:08

## 2024-01-01 RX ADMIN — ONDANSETRON 1 G: 2 INJECTION INTRAMUSCULAR; INTRAVENOUS at 05:08

## 2024-01-01 RX ADMIN — INDOMETHACIN 50 MEQ: 25 CAPSULE ORAL at 03:08

## 2024-01-01 RX ADMIN — CEFAZOLIN 2 G: 330 INJECTION, POWDER, FOR SOLUTION INTRAMUSCULAR; INTRAVENOUS at 09:08

## 2024-01-01 RX ADMIN — PROTAMINE SULFATE 275 MG: 10 INJECTION, SOLUTION INTRAVENOUS at 04:08

## 2024-01-01 RX ADMIN — VASOPRESSIN 5 UNITS: 20 INJECTION INTRAVENOUS at 05:08

## 2024-01-01 RX ADMIN — EPINEPHRINE 0.04 MCG/KG/MIN: 1 INJECTION INTRAMUSCULAR; INTRAVENOUS; SUBCUTANEOUS at 09:08

## 2024-01-01 RX ADMIN — ROCURONIUM BROMIDE 50 MG: 10 INJECTION, SOLUTION INTRAVENOUS at 10:08

## 2024-01-01 RX ADMIN — HYDROCORTISONE SODIUM SUCCINATE 100 MG: 100 INJECTION, POWDER, FOR SOLUTION INTRAMUSCULAR; INTRAVENOUS at 01:08

## 2024-01-01 RX ADMIN — EPINEPHRINE 0.42 MCG/KG/MIN: 1 INJECTION INTRAMUSCULAR; INTRAVENOUS; SUBCUTANEOUS at 04:08

## 2024-01-01 RX ADMIN — DEXTROSE MONOHYDRATE 0.1 MCG/KG/MIN: 50 INJECTION, SOLUTION INTRAVENOUS at 10:08

## 2024-01-01 RX ADMIN — NOREPINEPHRINE BITARTRATE 1200 MCG: 1 INJECTION, SOLUTION, CONCENTRATE INTRAVENOUS at 04:08

## 2024-01-01 RX ADMIN — EPINEPHRINE 0.6 MCG/KG/MIN: 1 INJECTION INTRAMUSCULAR; INTRAVENOUS; SUBCUTANEOUS at 09:08

## 2024-01-01 RX ADMIN — SODIUM BICARBONATE 100 MEQ: 84 INJECTION, SOLUTION INTRAVENOUS at 08:08

## 2024-01-01 RX ADMIN — MUPIROCIN 1 G: 20 OINTMENT TOPICAL at 07:08

## 2024-01-01 RX ADMIN — TRANEXAMIC ACID 900 MG: 100 INJECTION INTRAVENOUS at 09:08

## 2024-01-01 RX ADMIN — DEXTROSE MONOHYDRATE 500 ML: 100 INJECTION, SOLUTION INTRAVENOUS at 01:08

## 2024-01-01 RX ADMIN — MIDAZOLAM HYDROCHLORIDE 2 MG: 1 INJECTION, SOLUTION INTRAMUSCULAR; INTRAVENOUS at 08:08

## 2024-01-01 RX ADMIN — LIDOCAINE HYDROCHLORIDE 100 MG: 20 INJECTION, SOLUTION EPIDURAL; INFILTRATION; INTRACAUDAL; PERINEURAL at 10:08

## 2024-01-01 RX ADMIN — IPRATROPIUM BROMIDE AND ALBUTEROL SULFATE 3 ML: 2.5; .5 SOLUTION RESPIRATORY (INHALATION) at 08:08

## 2024-01-01 RX ADMIN — PROPOFOL 30 MCG/KG/MIN: 10 INJECTION, EMULSION INTRAVENOUS at 12:08

## 2024-01-01 RX ADMIN — HUMAN INSULIN 10 UNITS: 100 INJECTION, SOLUTION SUBCUTANEOUS at 03:08

## 2024-01-01 RX ADMIN — FAMOTIDINE 20 MG: 10 INJECTION, SOLUTION INTRAVENOUS at 07:08

## 2024-01-01 RX ADMIN — ANGIOTENSIN II 80 NG/KG/MIN: 2.5 INJECTION INTRAVENOUS at 03:08

## 2024-01-01 RX ADMIN — CALCIUM CHLORIDE 1 G: 100 INJECTION, SOLUTION INTRAVENOUS at 05:08

## 2024-01-01 RX ADMIN — VASOPRESSIN 0.08 UNITS/MIN: 20 INJECTION INTRAVENOUS at 05:08

## 2024-01-01 RX ADMIN — Medication 100 MG: at 08:08

## 2024-01-01 RX ADMIN — SUGAMMADEX 200 MG: 100 INJECTION, SOLUTION INTRAVENOUS at 09:08

## 2024-01-01 RX ADMIN — ROCURONIUM BROMIDE 50 MG: 10 INJECTION, SOLUTION INTRAVENOUS at 12:08

## 2024-01-01 RX ADMIN — SODIUM BICARBONATE 100 MEQ: 84 INJECTION, SOLUTION INTRAVENOUS at 09:08

## 2024-01-01 RX ADMIN — CEFAZOLIN 2 G: 330 INJECTION, POWDER, FOR SOLUTION INTRAMUSCULAR; INTRAVENOUS at 01:08

## 2024-01-01 RX ADMIN — MUPIROCIN 1 G: 20 OINTMENT TOPICAL at 09:08

## 2024-01-01 RX ADMIN — ROCURONIUM BROMIDE 50 MG: 10 INJECTION, SOLUTION INTRAVENOUS at 03:08

## 2024-01-01 RX ADMIN — SODIUM CHLORIDE, SODIUM GLUCONATE, SODIUM ACETATE, POTASSIUM CHLORIDE, MAGNESIUM CHLORIDE, SODIUM PHOSPHATE, DIBASIC, AND POTASSIUM PHOSPHATE: .53; .5; .37; .037; .03; .012; .00082 INJECTION, SOLUTION INTRAVENOUS at 01:08

## 2024-01-01 RX ADMIN — LIDOCAINE HYDROCHLORIDE 100 MG: 20 INJECTION, SOLUTION EPIDURAL; INFILTRATION; INTRACAUDAL; PERINEURAL at 04:08

## 2024-01-01 RX ADMIN — ANGIOTENSIN II 40 NG/KG/MIN: 2.5 INJECTION INTRAVENOUS at 04:08

## 2024-01-01 RX ADMIN — TRANEXAMIC ACID 1 MG/KG/HR: 100 INJECTION INTRAVENOUS at 09:08

## 2024-01-01 RX ADMIN — EPINEPHRINE 0.34 MCG/KG/MIN: 1 INJECTION INTRAMUSCULAR; INTRAVENOUS; SUBCUTANEOUS at 11:08

## 2024-01-01 RX ADMIN — INSULIN HUMAN 8.66 UNITS: 100 INJECTION, SOLUTION PARENTERAL at 01:08

## 2024-01-01 RX ADMIN — CEFAZOLIN 2 G: 330 INJECTION, POWDER, FOR SOLUTION INTRAMUSCULAR; INTRAVENOUS at 05:08

## 2024-01-01 RX ADMIN — VASOPRESSIN 0.04 UNITS/MIN: 20 INJECTION INTRAVENOUS at 09:08

## 2024-01-01 RX ADMIN — DESMOPRESSIN ACETATE 25 MCG: 4 SOLUTION INTRAVENOUS at 09:08

## 2024-01-01 RX ADMIN — HEPARIN SODIUM 29000 UNITS: 1000 INJECTION, SOLUTION INTRAVENOUS; SUBCUTANEOUS at 11:08

## 2024-01-01 RX ADMIN — IPRATROPIUM BROMIDE AND ALBUTEROL SULFATE 3 ML: 2.5; .5 SOLUTION RESPIRATORY (INHALATION) at 03:08

## 2024-01-01 RX ADMIN — FUROSEMIDE 120 MG: 10 INJECTION, SOLUTION INTRAVENOUS at 07:08

## 2024-01-02 ENCOUNTER — TELEPHONE (OUTPATIENT)
Dept: FAMILY MEDICINE | Facility: CLINIC | Age: 59
End: 2024-01-02

## 2024-01-02 NOTE — TELEPHONE ENCOUNTER
Mehdi Milner,  Please review this message below from this patient concerning referral to pulmonary.  Thank you.   Signed:  Ralf Warren LPN  ----- Message from Kyle Bautista sent at 1/2/2024 10:28 AM CST -----  Contact: Self  Type: Needs Medical Advice  Who Called:  Patient    Best Call Back Number: [phone]   Additional Information:  Called to let the office know that he is willing to go to [facility].  Would like referral. Please call.

## 2024-01-04 ENCOUNTER — PATIENT MESSAGE (OUTPATIENT)
Dept: FAMILY MEDICINE | Facility: CLINIC | Age: 59
End: 2024-01-04
Payer: MEDICARE

## 2024-01-17 ENCOUNTER — OFFICE VISIT (OUTPATIENT)
Dept: PULMONOLOGY | Facility: CLINIC | Age: 59
End: 2024-01-17
Payer: MEDICARE

## 2024-01-17 VITALS
HEART RATE: 66 BPM | DIASTOLIC BLOOD PRESSURE: 73 MMHG | OXYGEN SATURATION: 99 % | BODY MASS INDEX: 26.17 KG/M2 | HEIGHT: 73 IN | WEIGHT: 197.44 LBS | SYSTOLIC BLOOD PRESSURE: 156 MMHG

## 2024-01-17 DIAGNOSIS — R91.8 ABNORMAL CT SCAN OF LUNG: ICD-10-CM

## 2024-01-17 DIAGNOSIS — R05.3 CHRONIC COUGH: Primary | ICD-10-CM

## 2024-01-17 DIAGNOSIS — J84.10 CALCIFIED GRANULOMA OF LUNG: ICD-10-CM

## 2024-01-17 DIAGNOSIS — Z76.82 PATIENT ON WAITING LIST FOR KIDNEY TRANSPLANT: ICD-10-CM

## 2024-01-17 DIAGNOSIS — R13.10 DYSPHAGIA, UNSPECIFIED TYPE: ICD-10-CM

## 2024-01-17 DIAGNOSIS — R91.8 LUNG NODULES: ICD-10-CM

## 2024-01-17 DIAGNOSIS — J84.9 ILD (INTERSTITIAL LUNG DISEASE): ICD-10-CM

## 2024-01-17 DIAGNOSIS — Z77.090 ASBESTOS EXPOSURE: ICD-10-CM

## 2024-01-17 PROCEDURE — 99999 PR PBB SHADOW E&M-EST. PATIENT-LVL IV: CPT | Mod: PBBFAC,TXP,, | Performed by: INTERNAL MEDICINE

## 2024-01-17 PROCEDURE — 99214 OFFICE O/P EST MOD 30 MIN: CPT | Mod: PBBFAC,PO,TXP | Performed by: INTERNAL MEDICINE

## 2024-01-17 PROCEDURE — 99205 OFFICE O/P NEW HI 60 MIN: CPT | Mod: S$PBB,TXP,, | Performed by: INTERNAL MEDICINE

## 2024-01-17 RX ORDER — FLUTICASONE FUROATE, UMECLIDINIUM BROMIDE AND VILANTEROL TRIFENATATE 200; 62.5; 25 UG/1; UG/1; UG/1
1 POWDER RESPIRATORY (INHALATION) DAILY
Qty: 60 EACH | Refills: 11 | Status: SHIPPED | OUTPATIENT
Start: 2024-01-17 | End: 2024-05-09 | Stop reason: SDUPTHER

## 2024-01-17 RX ORDER — LORATADINE 10 MG/1
10 TABLET ORAL DAILY
COMMUNITY

## 2024-01-17 NOTE — PATIENT INSTRUCTIONS
Chronic cough and abnormal ct--     Pattern for cough is vague -- esophageal issues likely contribute but vague.  Should have upper endoscopy/gi eval.  Lung tissue sl abn and bronchial tubes prominent...      Woud give trial inhaler - trelegy once daily, and also use nexium daily  for months and note chronic cough.    Would check breathing capacity and follow up ct chest (high resolution and prone) in 3 months.      Old ct would be helpful but no history.    Occasional small areas of pleural thickening and ? Pleural calcification in upper lungs with vague history asbestos exposure and pleuritic pain..      Pattern of ground glass seems more geographic than nodular -- will need to monitor-- I do not see issue with transplant...

## 2024-01-17 NOTE — PROGRESS NOTES
1/17/2024    Spike Estrella  New Patient Consult    No chief complaint on file.      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  No clear asbestos -- was [employer].     Pt has had chr cough for for 20-30 yrs-- seldom mucous which would be clear.     Pt started renal dz from Lupus -- dx 1987.  Had skin, heart valves, and kidney.  Pt on Plaquenil and cellcept for years.   Plaquenil decreased from 2/d to one daily.  [place]     Being considered for renal transplant-- gets hemo at home and may be getting transplant in near future..    Pt has had some gerd (runs in family with severe dyspepsia) and uses nexium from daily to skipping  a few wks.     Pt was on prednisone and ctx in past      Pt had ct abd with vage rll 8 mm ggo new from 2021 to ct abd 12/2023.      Ct chest done in f./u and viewed today-- geographic ggo seen florencia rll post, and other ggo and solid nodules in upper lungs.      There were small upper lung pleural plaques that may  contain calcification?    Pt had had increased sinus problems -- has nasal stuffiness and increased cough...  rarely uses abx.      No asthma nor fh asthma.    Pt denies aspiration but swallow slowing down last yrs, pt had had intermittent swallow difficulties last 20 yrs-- occurs weekly.  Pattern has increased in last few yrs.      Pt sleeps normally -- will arouse to belch. Pt will arouse with chest  pressure -- he will belch and return to sleep.    No upper endo.      Pt on disabled with dialysis.    Pt had had pleuritic chest pain in past-- none in last few yrs.                 PFSH:  Past Medical History:   Diagnosis Date    Anemia of renal disease     Essential hypertension     Hx of aortic valve replacement     Immunosuppressed status     Lupus nephritis     Metabolic acidosis     Personal history of colonic polyps     Secondary hyperparathyroidism of renal origin     Stage 4 chronic kidney disease     Stenosis and  "insufficiency of lacrimal passages     Systemic lupus erythematosus          Past Surgical History:   Procedure Laterality Date    AORTIC VALVE REPLACEMENT  06/16/2014    porcine valve replacement    CARDIAC VALVE REPLACEMENT  06/2014    TONSILLECTOMY       Social History     Tobacco Use    Smoking status: Never     Passive exposure: Never    Smokeless tobacco: Former     Types: Snuff, Chew     Quit date: 1997   Substance Use Topics    Alcohol use: Yes     Alcohol/week: 1.0 standard drink of alcohol     Types: 1 Cans of beer per week     Comment: occasionally     Drug use: No     Family History   Problem Relation Age of Onset    Valvular heart disease Mother     Hypertension Mother     Lymphoma Father     Cancer Father     No Known Problems Brother     Coronary artery disease Maternal Uncle         s/p CABG in his 50-60s    No Known Problems Brother     No Known Problems Brother     No Known Problems Brother     Kidney disease Neg Hx     Diabetes Neg Hx     Stroke Neg Hx     Lupus Neg Hx      Review of patient's allergies indicates:   Allergen Reactions    Amlodipine Swelling    Adhesive     Iodine     Codeine Anxiety          Review of Systems:  a review of eleven systems covering constitutional, Eye, HEENT, Psych, Respiratory, Cardiac, GI, , Musculoskeletal, Endocrine, Dermatologic was negative except for pertinent findings as listed ABOVE and below:  pertinent positives as above, rest good        Exam:Comprehensive exam done. BP (!) 156/73 (BP Location: Right arm, Patient Position: Sitting, BP Method: Small (Automatic))   Pulse 66   Ht 6' 1" (1.854 m)   Wt 89.5 kg (197 lb 6.8 oz)   SpO2 99% Comment: on room air at rest  BMI 26.05 kg/m²   Exam included Vitals as listed, and patient's appearance and affect and alertness and mood, oral exam for yeast and hygiene and pharynx lesions and Mallapatti (M) score, neck with inspection for jvd and masses and thyroid abnormalities and lymph nodes (supraclavicular and " infraclavicular nodes and axillary also examined and noted if abn), chest exam included symmetry and effort and fremitus and percussion and auscultation, cardiac exam included rhythm and gallops and murmur and rubs and jvd and edema, abdominal exam for mass and hepatosplenomegaly and tenderness and hernias and bowel sounds, Musculoskeletal exam with muscle tone and posture and mobility/gait and  strength, and skin for rashes and cyanosis and pallor and turgor, extremity for clubbing.  Findings were normal except for pertinent findings listed below:  M2, chest is symmetric, no distress, normal percussion, normal fremitus and good normal breath sounds  No clubbing           Radiographs (ct chest and cxr) reviewed: view by direct vision      Labs reviewed           PFT will be done and results to be reviewed        Plan:  Clinical impression is resonably certain and repeated evaluation prn +/- follow up will be needed as below.    Diagnoses and all orders for this visit:    Chronic cough  -     CT Chest High Resolution Without Contrast; Future  -     Complete PFT with bronchodilator; Future  -     fluticasone-umeclidin-vilanter (TRELEGY ELLIPTA) 200-62.5-25 mcg inhaler; Inhale 1 puff into the lungs once daily.    Patient on waiting list for kidney transplant  -     Ambulatory consult to Pulmonology    Dysphagia, unspecified type    Abnormal CT scan of lung    Calcified granuloma of lung    Lung nodules  -     Complete PFT with bronchodilator; Future    Asbestos exposure  -     CT Chest High Resolution Without Contrast; Future    ILD (interstitial lung disease)  -     CT Chest High Resolution Without Contrast; Future  -     Complete PFT with bronchodilator; Future        Follow up in about 3 months (around 4/17/2024), or if symptoms worsen or fail to improve.    Discussed with patient above for education the following:      Patient Instructions   Chronic cough and abnormal ct--     Pattern for cough is vague --  esophageal issues likely contribute but vague.  Should have upper endoscopy/gi eval.  Lung tissue sl abn and bronchial tubes prominent...      Woud give trial inhaler - trelegy once daily, and also use nexium daily  for months and note chronic cough.    Would check breathing capacity and follow up ct chest (high resolution and prone) in 3 months.      Old ct would be helpful but no history.    Occasional small areas of pleural thickening and ? Pleural calcification in upper lungs with vague history asbestos exposure and pleuritic pain..      Pattern of ground glass seems more geographic than nodular -- will need to monitor-- I do not see issue with transplant...            Evaluation took 72 min

## 2024-01-22 ENCOUNTER — OFFICE VISIT (OUTPATIENT)
Dept: FAMILY MEDICINE | Facility: CLINIC | Age: 59
End: 2024-01-22
Payer: MEDICARE

## 2024-01-22 VITALS
BODY MASS INDEX: 26.33 KG/M2 | DIASTOLIC BLOOD PRESSURE: 82 MMHG | RESPIRATION RATE: 14 BRPM | HEART RATE: 72 BPM | SYSTOLIC BLOOD PRESSURE: 142 MMHG | HEIGHT: 73 IN | WEIGHT: 198.69 LBS | OXYGEN SATURATION: 97 %

## 2024-01-22 DIAGNOSIS — N18.6 ESRD ON HEMODIALYSIS: ICD-10-CM

## 2024-01-22 DIAGNOSIS — I10 ESSENTIAL HYPERTENSION: Chronic | ICD-10-CM

## 2024-01-22 DIAGNOSIS — N25.81 SECONDARY HYPERPARATHYROIDISM OF RENAL ORIGIN: ICD-10-CM

## 2024-01-22 DIAGNOSIS — Z99.2 ESRD ON HEMODIALYSIS: ICD-10-CM

## 2024-01-22 DIAGNOSIS — Z76.82 PATIENT ON WAITING LIST FOR KIDNEY TRANSPLANT: Chronic | ICD-10-CM

## 2024-01-22 DIAGNOSIS — J32.9 RECURRENT SINUSITIS: ICD-10-CM

## 2024-01-22 DIAGNOSIS — Z79.899 DRUG-INDUCED IMMUNODEFICIENCY: ICD-10-CM

## 2024-01-22 DIAGNOSIS — M32.14 LUPUS NEPHRITIS: ICD-10-CM

## 2024-01-22 DIAGNOSIS — E78.00 PURE HYPERCHOLESTEROLEMIA: Primary | ICD-10-CM

## 2024-01-22 DIAGNOSIS — D84.821 DRUG-INDUCED IMMUNODEFICIENCY: ICD-10-CM

## 2024-01-22 DIAGNOSIS — Z95.2 HX OF AORTIC VALVE REPLACEMENT: ICD-10-CM

## 2024-01-22 PROCEDURE — 99214 OFFICE O/P EST MOD 30 MIN: CPT | Mod: S$GLB,,, | Performed by: FAMILY MEDICINE

## 2024-01-22 RX ORDER — PANTOPRAZOLE SODIUM 40 MG/1
40 TABLET, DELAYED RELEASE ORAL DAILY
COMMUNITY
Start: 2024-01-16

## 2024-01-22 RX ORDER — MYCOPHENOLATE MOFETIL 500 MG/1
1000 TABLET ORAL 2 TIMES DAILY
Refills: 3
Start: 2024-01-22

## 2024-01-22 RX ORDER — TRAMADOL HYDROCHLORIDE 50 MG/1
50 TABLET ORAL EVERY 8 HOURS PRN
COMMUNITY

## 2024-01-22 NOTE — PROGRESS NOTES
"    [facility] - [place], [place]    Family Medicine Office Visit    Chief Complaint   Patient presents with    Follow-up     6 month         HPI:  Followu p chronic conditions\    ESRD - lupus nephritis - stable, still awaiting renal transplant  Hyperlipidemia stable on appropriate intensity statin therapy  Distant aortic valve replacement  BP elevated today - didn't take AM meds yet  Sees Dr. Miller for cardiac management    Will be having repeat CT of chest upcoming - previous showed some pneumonitis    Has recurrent sinus issues, and wants ENT referral    Lumbar issues stable with stretching - has Seen neurosurgery in past    ROS: as above    Vitals:    01/22/24 0915   BP: (!) 160/80   BP Location: Right arm   Patient Position: Sitting   BP Method: Medium (Manual)   Pulse: 72   Resp: 14   SpO2: 97%   Weight: 90.1 kg (198 lb 11.2 oz)   Height: 6' 0.99" (1.854 m)      Body mass index is 26.22 kg/m².      General:  AOx3, well nourished and developed in no acute distress  Eyes:  PERRLA, EOMI, vision intact grossly  ENT:  normal hearing, moist oral mucosa  Neck:  trachea midline with no masses or thyromegaly  Heart:  RRR, no murmurs.  No edema noted, extremities warm and well perfused - L AV fistula  Lungs:  clear to auscultation bilaterally with symmetric chest movement  Abdomen:  Soft, nontender, nondistended.  Normal bowel sounds  Musculoskeletal:  Normal gait.  Normal posture.  Normal muscular development with no joint swelling.  Neurological:  CN II-XII grossly intact. Symmetric strength and sensation  Psych:  Normal mood and affect.  Able to demonstrate good judgement and personal insight.      Assessment/Plan:    1. Pure hypercholesterolemia    2. Essential hypertension    3. Hx of aortic valve replacement    4. Patient on waiting list for kidney transplant    5. Lupus nephritis    6. ESRD on hemodialysis    7. Drug-induced immunodeficiency    8. Secondary hyperparathyroidism of " renal origin    9. Recurrent sinusitis       Stable  Stable historically  Stable with cardiology  Continue to monitor  As above  Stable as above  Stable  Stable  Send to ENT

## 2024-02-22 ENCOUNTER — TELEPHONE (OUTPATIENT)
Dept: FAMILY MEDICINE | Facility: CLINIC | Age: 59
End: 2024-02-22
Payer: MEDICARE

## 2024-02-22 NOTE — TELEPHONE ENCOUNTER
----- Message from Basilia Juarez sent at 2/22/2024  1:59 PM CST -----  Contact: self  Type: Needs Medical Advice  Who Called:  pt  Best Call Back Number: [phone]   Additional Information: pt would like a copy of his medical records please call

## 2024-04-23 ENCOUNTER — HOSPITAL ENCOUNTER (OUTPATIENT)
Dept: RADIOLOGY | Facility: HOSPITAL | Age: 59
Discharge: HOME | End: 2024-04-23
Attending: INTERNAL MEDICINE
Payer: MEDICARE

## 2024-04-23 ENCOUNTER — HOSPITAL ENCOUNTER (OUTPATIENT)
Dept: PULMONOLOGY | Facility: HOSPITAL | Age: 59
Discharge: HOME | End: 2024-04-23
Attending: INTERNAL MEDICINE
Payer: MEDICARE

## 2024-04-23 DIAGNOSIS — J84.9 ILD (INTERSTITIAL LUNG DISEASE): ICD-10-CM

## 2024-04-23 DIAGNOSIS — R05.3 CHRONIC COUGH: ICD-10-CM

## 2024-04-23 DIAGNOSIS — Z77.090 ASBESTOS EXPOSURE: ICD-10-CM

## 2024-04-23 DIAGNOSIS — R91.8 LUNG NODULES: ICD-10-CM

## 2024-04-23 LAB
DLCO SINGLE BREATH LLN: 23.91
DLCO SINGLE BREATH PRE REF: 54.2 %
DLCO SINGLE BREATH REF: 30.84
DLCOC SBVA LLN: 2.98
DLCOC SBVA REF: 4.09
DLCOC SINGLE BREATH LLN: 23.91
DLCOC SINGLE BREATH REF: 30.84
DLCOVA LLN: 2.98
DLCOVA PRE REF: 59.9 %
DLCOVA PRE: 2.45 ML/(MIN*MMHG*L) (ref 2.98–5.21)
DLCOVA REF: 4.09
ERV LLN: -16448.76
ERV PRE REF: 213 %
ERV REF: 1.24
FEF 25 75 CHG: 56.8 %
FEF 25 75 LLN: 1.51
FEF 25 75 POST REF: 109.9 %
FEF 25 75 PRE REF: 70.1 %
FEF 25 75 REF: 3.1
FET100 CHG: -4 %
FEV1 CHG: 9.7 %
FEV1 FVC CHG: 11.5 %
FEV1 FVC LLN: 65
FEV1 FVC POST REF: 101.6 %
FEV1 FVC PRE REF: 91.2 %
FEV1 FVC REF: 77
FEV1 LLN: 2.86
FEV1 POST REF: 99.4 %
FEV1 PRE REF: 90.6 %
FEV1 REF: 3.79
FRCPLETH LLN: 2.74
FRCPLETH PREREF: 137.7 %
FRCPLETH REF: 3.73
FVC CHG: -1.6 %
FVC LLN: 3.76
FVC POST REF: 97.5 %
FVC PRE REF: 99 %
FVC REF: 4.94
IVC PRE: 4.56 L (ref 3.76–6.14)
IVC SINGLE BREATH LLN: 3.76
IVC SINGLE BREATH PRE REF: 92.2 %
IVC SINGLE BREATH REF: 4.94
MVV LLN: 124
MVV PRE REF: 91.7 %
MVV REF: 145
PEF CHG: -13.8 %
PEF LLN: 7.23
PEF POST REF: 91.9 %
PEF PRE REF: 106.6 %
PEF REF: 9.68
POST FEF 25 75: 3.4 L/S (ref 1.51–5.25)
POST FET 100: 9.97 SEC
POST FEV1 FVC: 78.31 % (ref 65.01–87.51)
POST FEV1: 3.77 L (ref 2.86–4.68)
POST FVC: 4.82 L (ref 3.76–6.14)
POST PEF: 8.89 L/S (ref 7.23–12.13)
PRE DLCO: 16.71 ML/(MIN*MMHG) (ref 23.91–37.77)
PRE ERV: 2.65 L (ref -16448.76–16451.24)
PRE FEF 25 75: 2.17 L/S (ref 1.51–5.25)
PRE FET 100: 10.38 SEC
PRE FEV1 FVC: 70.24 % (ref 65.01–87.51)
PRE FEV1: 3.44 L (ref 2.86–4.68)
PRE FRC PL: 5.14 L (ref 2.74–4.72)
PRE FVC: 4.9 L (ref 3.76–6.14)
PRE MVV: 133.32 L/MIN (ref 123.55–167.16)
PRE PEF: 10.32 L/S (ref 7.23–12.13)
PRE RV: 2.49 L (ref 1.81–3.16)
PRE TLC: 7.43 L (ref 6.38–8.68)
RAW LLN: 3.06
RAW PRE REF: 45.3 %
RAW PRE: 1.39 CMH2O*S/L (ref 3.06–3.06)
RAW REF: 3.06
RV LLN: 1.81
RV PRE REF: 100.1 %
RV REF: 2.49
RVTLC LLN: 28
RVTLC PRE REF: 89.6 %
RVTLC PRE: 33.49 % (ref 28.38–46.34)
RVTLC REF: 37
TLC LLN: 6.38
TLC PRE REF: 98.6 %
TLC REF: 7.53
VA PRE: 6.81 L (ref 7.38–7.38)
VA SINGLE BREATH LLN: 7.38
VA SINGLE BREATH PRE REF: 92.3 %
VA SINGLE BREATH REF: 7.38
VC LLN: 3.76
VC PRE REF: 99.9 %
VC PRE: 4.94 L (ref 3.76–6.14)
VC REF: 4.94

## 2024-04-23 PROCEDURE — 94726 PLETHYSMOGRAPHY LUNG VOLUMES: CPT | Mod: 26,,, | Performed by: INTERNAL MEDICINE

## 2024-04-23 PROCEDURE — 94729 DIFFUSING CAPACITY: CPT | Mod: 26,,, | Performed by: INTERNAL MEDICINE

## 2024-04-23 PROCEDURE — 94726 PLETHYSMOGRAPHY LUNG VOLUMES: CPT

## 2024-04-23 PROCEDURE — 71250 CT THORAX DX C-: CPT | Mod: TC

## 2024-04-23 PROCEDURE — 94060 EVALUATION OF WHEEZING: CPT | Mod: 26,,, | Performed by: INTERNAL MEDICINE

## 2024-04-23 PROCEDURE — 94729 DIFFUSING CAPACITY: CPT

## 2024-04-23 PROCEDURE — 71250 CT THORAX DX C-: CPT | Mod: 26,,, | Performed by: RADIOLOGY

## 2024-04-23 PROCEDURE — 94060 EVALUATION OF WHEEZING: CPT

## 2024-04-23 RX ORDER — ALBUTEROL SULFATE 2.5 MG/.5ML
SOLUTION RESPIRATORY (INHALATION)
Status: DISCONTINUED
Start: 2024-04-23 | End: 2024-04-24 | Stop reason: HOSPADM

## 2024-05-09 ENCOUNTER — OFFICE VISIT (OUTPATIENT)
Dept: PULMONOLOGY | Facility: CLINIC | Age: 59
End: 2024-05-09
Payer: MEDICARE

## 2024-05-09 VITALS
DIASTOLIC BLOOD PRESSURE: 72 MMHG | HEART RATE: 77 BPM | WEIGHT: 195.31 LBS | OXYGEN SATURATION: 96 % | HEIGHT: 72 IN | BODY MASS INDEX: 26.45 KG/M2 | SYSTOLIC BLOOD PRESSURE: 140 MMHG

## 2024-05-09 DIAGNOSIS — R05.3 CHRONIC COUGH: ICD-10-CM

## 2024-05-09 PROCEDURE — 99999 PR PBB SHADOW E&M-EST. PATIENT-LVL III: CPT | Mod: PBBFAC,TXP,, | Performed by: INTERNAL MEDICINE

## 2024-05-09 PROCEDURE — 99213 OFFICE O/P EST LOW 20 MIN: CPT | Mod: S$PBB,TXP,, | Performed by: INTERNAL MEDICINE

## 2024-05-09 PROCEDURE — 99213 OFFICE O/P EST LOW 20 MIN: CPT | Mod: PBBFAC,PO,NTX | Performed by: INTERNAL MEDICINE

## 2024-05-09 RX ORDER — ATORVASTATIN CALCIUM 20 MG/1
20 TABLET, FILM COATED ORAL NIGHTLY
COMMUNITY
Start: 2024-02-27

## 2024-05-09 RX ORDER — FLUTICASONE FUROATE, UMECLIDINIUM BROMIDE AND VILANTEROL TRIFENATATE 200; 62.5; 25 UG/1; UG/1; UG/1
1 POWDER RESPIRATORY (INHALATION) DAILY
Qty: 60 EACH | Refills: 11 | Status: SHIPPED | OUTPATIENT
Start: 2024-05-09

## 2024-05-09 RX ORDER — METOPROLOL SUCCINATE 25 MG/1
25 TABLET, EXTENDED RELEASE ORAL
COMMUNITY
Start: 2024-02-20

## 2024-05-09 NOTE — PROGRESS NOTES
5/9/2024    Spike Estrella  New Patient Consult    Chief Complaint   Patient presents with    Follow-up       HPI:    5/9/2024 still cough but lessened. Had sinus evaluation -- offer sinus procedure.  Had swallow eval-- had flap problem and to see therapist, swallow better with chin tuck to left.   Inhaler made no difference.  Uses claritin.  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  No clear asbestos -- was [place] [employer].     Pt has had chr cough for for 20-30 yrs-- seldom mucous which would be clear.     Pt started renal dz from Lupus -- dx 1987.  Had skin, heart valves, and kidney.  Pt on Plaquenil and cellcept for years.   Plaquenil decreased from 2/d to one daily.  [place]     Being considered for renal transplant-- gets hemo at home and may be getting transplant in near future..    Pt has had some gerd (runs in family with severe dyspepsia) and uses nexium from daily to skipping  a few wks.     Pt was on prednisone and ctx in past      Pt had ct abd with vage rll 8 mm ggo new from 2021 to ct abd 12/2023.      Ct chest done in f./u and viewed today-- geographic ggo seen florencia rll post, and other ggo and solid nodules in upper lungs.      There were small upper lung pleural plaques that may  contain calcification?    Pt had had increased sinus problems -- has nasal stuffiness and increased cough...  rarely uses abx.      No asthma nor fh asthma.    Pt denies aspiration but swallow slowing down last yrs, pt had had intermittent swallow difficulties last 20 yrs-- occurs weekly.  Pattern has increased in last few yrs.      Pt sleeps normally -- will arouse to belch. Pt will arouse with chest  pressure -- he will belch and return to sleep.    No upper endo.      Pt on disabled with dialysis.    Pt had had pleuritic chest pain in past-- none in last few yrs.    Patient Instructions   Chronic cough and  abnormal ct--     Pattern for cough is vague -- esophageal issues likely contribute but vague.  Should have upper endoscopy/gi eval.  Lung tissue sl abn and bronchial tubes prominent...      Woud give trial inhaler - trelegy once daily, and also use nexium daily  for months and note chronic cough.    Would check breathing capacity and follow up ct chest (high resolution and prone) in 3 months.      Old ct would be helpful but no history.    Occasional small areas of pleural thickening and ? Pleural calcification in upper lungs with vague history asbestos exposure and pleuritic pain..      Pattern of ground glass seems more geographic than nodular -- will need to monitor-- I do not see issue with transplant...             PFSH:  Past Medical History:   Diagnosis Date    Anemia of renal disease     Essential hypertension     Hx of aortic valve replacement     Immunosuppressed status     Lupus nephritis     Metabolic acidosis     Personal history of colonic polyps     Secondary hyperparathyroidism of renal origin     Stage 4 chronic kidney disease     Stenosis and insufficiency of lacrimal passages     Systemic lupus erythematosus          Past Surgical History:   Procedure Laterality Date    AORTIC VALVE REPLACEMENT  06/16/2014    porcine valve replacement    CARDIAC VALVE REPLACEMENT  06/2014    TONSILLECTOMY       Social History     Tobacco Use    Smoking status: Never     Passive exposure: Never    Smokeless tobacco: Former     Types: Snuff, Chew     Quit date: 1997   Substance Use Topics    Alcohol use: Yes     Alcohol/week: 1.0 standard drink of alcohol     Types: 1 Cans of beer per week     Comment: occasionally     Drug use: No     Family History   Problem Relation Name Age of Onset    Valvular heart disease Mother Nevaeh     Hypertension Mother Nevaeh     Lymphoma Father Dequan Estrella     Cancer Father Dequan Estrella     No Known Problems Brother      Coronary artery disease Maternal Uncle          s/p CABG in  his 50-60s    No Known Problems Brother      No Known Problems Brother      No Known Problems Brother      Kidney disease Neg Hx      Diabetes Neg Hx      Stroke Neg Hx      Lupus Neg Hx       Review of patient's allergies indicates:   Allergen Reactions    Amlodipine Swelling    Adhesive     Iodine     Codeine Anxiety          Review of Systems:  a review of eleven systems covering constitutional, Eye, HEENT, Psych, Respiratory, Cardiac, GI, , Musculoskeletal, Endocrine, Dermatologic was negative except for pertinent findings as listed ABOVE and below:  pertinent positives as above, rest good        Exam:Comprehensive exam done. BP (!) 140/72 (BP Location: Right arm, Patient Position: Sitting, BP Method: Small (Automatic))   Pulse 77   Ht 6' (1.829 m)   Wt 88.6 kg (195 lb 5.2 oz)   SpO2 96% Comment: on room air at rest  BMI 26.49 kg/m²   Exam included Vitals as listed, and patient's appearance and affect and alertness and mood, oral exam for yeast and hygiene and pharynx lesions and Mallapatti (M) score, neck with inspection for jvd and masses and thyroid abnormalities and lymph nodes (supraclavicular and infraclavicular nodes and axillary also examined and noted if abn), chest exam included symmetry and effort and fremitus and percussion and auscultation, cardiac exam included rhythm and gallops and murmur and rubs and jvd and edema, abdominal exam for mass and hepatosplenomegaly and tenderness and hernias and bowel sounds, Musculoskeletal exam with muscle tone and posture and mobility/gait and  strength, and skin for rashes and cyanosis and pallor and turgor, extremity for clubbing.  Findings were normal except for pertinent findings listed below:  M2, chest is symmetric, no distress, normal percussion, normal fremitus and good normal breath sounds  No clubbing           Radiographs (ct chest and cxr) reviewed: view by direct vision      Labs reviewed           PFT will be done and results to be  reviewed        Plan:  Clinical impression is resonably certain and repeated evaluation prn +/- follow up will be needed as below.    Spike was seen today for follow-up.    Diagnoses and all orders for this visit:    Chronic cough  -     fluticasone-umeclidin-vilanter (TRELEGY ELLIPTA) 200-62.5-25 mcg inhaler; Inhale 1 puff into the lungs once daily.          No follow-ups on file.    Discussed with patient above for education the following:      Patient Instructions   Cough has improved.    Swallow problem improved with chin tuck and  turning head to left    Breathing test did show good lung function.   Dlco is minimally decreased.  Ct chest shows stable minimal ild with ggo in posterior right upper lung -- could be from prior aspiration.    Breathing test suggest inhaler may help lung fucntion but response was minimal.  Try trelegy once a day      There are no issues to prevent kidney transplant from lung  evaluation.      Would repeat ct chest if develop worsening cough or short breath.  May consider repeat ct chest in 1-2 yrs if any lingering symptoms.....

## 2024-05-09 NOTE — PATIENT INSTRUCTIONS
Cough has improved.    Swallow problem improved with chin tuck and  turning head to left    Breathing test did show good lung function.   Dlco is minimally decreased.  Ct chest shows stable minimal ild with ggo in posterior right upper lung -- could be from prior aspiration.    Breathing test suggest inhaler may help lung fucntion but response was minimal.  Try trelegy once a day      There are no issues to prevent kidney transplant from lung  evaluation.      Would repeat ct chest if develop worsening cough or short breath.  May consider repeat ct chest in 1-2 yrs if any lingering symptoms.....

## 2024-05-28 ENCOUNTER — OFFICE VISIT (OUTPATIENT)
Dept: CARDIOTHORACIC SURGERY | Facility: CLINIC | Age: 59
End: 2024-05-28
Payer: MEDICARE

## 2024-05-28 VITALS
HEIGHT: 72 IN | HEART RATE: 67 BPM | DIASTOLIC BLOOD PRESSURE: 72 MMHG | WEIGHT: 194.88 LBS | BODY MASS INDEX: 26.4 KG/M2 | SYSTOLIC BLOOD PRESSURE: 152 MMHG | OXYGEN SATURATION: 100 %

## 2024-05-28 DIAGNOSIS — Z95.2 HX OF AORTIC VALVE REPLACEMENT: ICD-10-CM

## 2024-05-28 DIAGNOSIS — I34.0 SEVERE MITRAL INSUFFICIENCY: Primary | ICD-10-CM

## 2024-05-28 PROCEDURE — 99205 OFFICE O/P NEW HI 60 MIN: CPT | Mod: S$PBB,NTX,, | Performed by: THORACIC SURGERY (CARDIOTHORACIC VASCULAR SURGERY)

## 2024-05-28 PROCEDURE — 99999 PR PBB SHADOW E&M-EST. PATIENT-LVL III: CPT | Mod: PBBFAC,TXP,, | Performed by: THORACIC SURGERY (CARDIOTHORACIC VASCULAR SURGERY)

## 2024-05-28 PROCEDURE — 99213 OFFICE O/P EST LOW 20 MIN: CPT | Mod: PBBFAC,TXP | Performed by: THORACIC SURGERY (CARDIOTHORACIC VASCULAR SURGERY)

## 2024-05-28 RX ORDER — PANTOPRAZOLE SODIUM 40 MG/1
TABLET, DELAYED RELEASE ORAL
COMMUNITY
Start: 2024-04-25

## 2024-05-28 RX ORDER — FOLIC ACID/VIT B COMPLEX AND C 0.8 MG
1 TABLET ORAL
COMMUNITY
Start: 2024-05-13

## 2024-05-28 NOTE — PROGRESS NOTES
Subjective:      Patient ID: Spike Estrella is a 58 y.o. male.    Chief Complaint: No chief complaint on file.      HPI:  Spike Estrella is a 58 y.o. male who presents to surgical evaluationn for mitral insufficiency. Medical conditions include s/p mini AVR by Dr. Bowman in 2014, Coronary artery disease , ESRD (end stage renal disease) on dialysis , Hypertension, Lupus. He has known MR. Surveillance echo revealed Mitral leaflet and calcified, mixed mitosis appearing mitral leaflet chordal structures, slight tethering of the tip of the mitral leaflet, 2 mitral regurgitant jets central and the other eccentric and hugs of the left atrial wall of course the jets like this can quite be underestimated, mean gradient was 9.0 suggestive of moderate to severe mitral stenosis; mild AV perivalvular leak. Nuclear stress test 4/16 showed no ischemia   Patient reports worsening fatigue and dyspnea on exertion with strenuous activities  for the past year. Gets very winded when walking up hill and pushing wheel barrel. Also notes frequent atypical chest pains that wakes him up at night, describes it as indigestion. There is associated SOB. He was recently placed on PPI with mild relief. Denies orthopnea, PND, palpitations, LE edema. No prior strokes, seizure, stents. Has had one sternotomy. Independently perform ADLS without issues. No AD for walking. Denies tobacco, ETOH, illicit drug use.     Family and social history reviewed    Review of patient's allergies indicates:   Allergen Reactions    Amlodipine Swelling    Adhesive     Iodine     Codeine Anxiety     Past Medical History:   Diagnosis Date    Anemia of renal disease     Essential hypertension     Hx of aortic valve replacement     Immunosuppressed status     Lupus nephritis     Metabolic acidosis     Personal history of colonic polyps     Secondary hyperparathyroidism of renal origin     Stage 4 chronic kidney disease     Stenosis and insufficiency of lacrimal  passages     Systemic lupus erythematosus      Past Surgical History:   Procedure Laterality Date    AORTIC VALVE REPLACEMENT  06/16/2014    porcine valve replacement    CARDIAC VALVE REPLACEMENT  06/2014    TONSILLECTOMY       Family History       Problem Relation (Age of Onset)    Cancer Father    Coronary artery disease Maternal Uncle    Hypertension Mother    Lymphoma Father    No Known Problems Brother, Brother, Brother, Brother    Valvular heart disease Mother          Social History     Socioeconomic History    Marital status:    Tobacco Use    Smoking status: Never     Passive exposure: Never    Smokeless tobacco: Former     Types: Snuff, Chew     Quit date: 1997   Substance and Sexual Activity    Alcohol use: Yes     Alcohol/week: 1.0 standard drink of alcohol     Types: 1 Cans of beer per week     Comment: occasionally     Drug use: No    Sexual activity: Yes     Partners: Female     Birth control/protection: None   Social History Narrative    Lives with wife and son     Working as a         Current medications Reviewed  Current Outpatient Medications on File Prior to Visit   Medication Sig Dispense Refill    ascorbic acid, vitamin C, (VITAMIN C) 500 MG tablet Take 500 mg by mouth.      atorvastatin (LIPITOR) 20 MG tablet Take 20 mg by mouth every evening.      clopidogreL (PLAVIX) 75 mg tablet Take 75 mg by mouth.      fluticasone-umeclidin-vilanter (TRELEGY ELLIPTA) 200-62.5-25 mcg inhaler Inhale 1 puff into the lungs once daily. 60 each 11    folic acid/vit B complex and C (NEPHRO-ADAMARIS ORAL) Take by mouth.      iron polysaccharide complex, vit c-sa (FERREX 150 PLUS) 150-50-50 mg Cap capsule Take 1 capsule by mouth.      iron sucrose (VENOFER) 200 mg iron/10 mL Soln injection Inject 200 mg into the vein.      loratadine (CLARITIN) 10 mg tablet Take 10 mg by mouth once daily.      metoprolol succinate (TOPROL-XL) 25 MG 24 hr tablet Take 25 mg by mouth.      mycophenolate (CELLCEPT) 500 mg  Tab Take 2 tablets (1,000 mg total) by mouth 2 (two) times daily.  3    pantoprazole (PROTONIX) 40 MG tablet Take 40 mg by mouth once daily.      SEVELAMER CARBONATE ORAL Take 800 mg by mouth 3 (three) times daily with meals.      traMADoL (ULTRAM) 50 mg tablet Take 50 mg by mouth every 8 (eight) hours as needed for Pain.      vitamin D (VITAMIN D3) 1000 units Tab Take 1,000 Units by mouth once daily.       No current facility-administered medications on file prior to visit.       Review of Systems   Constitutional:  Positive for fatigue. Negative for activity change, appetite change and fever.   HENT:  Negative for nosebleeds.    Respiratory:  Positive for shortness of breath. Negative for cough.    Cardiovascular:  Positive for chest pain. Negative for palpitations and leg swelling.   Gastrointestinal:  Negative for abdominal distention, abdominal pain and nausea.   Genitourinary:  Negative for frequency.   Musculoskeletal:  Negative for arthralgias and myalgias.   Skin:  Negative for rash.   Neurological:  Negative for dizziness and numbness.   Hematological:  Does not bruise/bleed easily.     Objective:   Physical Exam  Constitutional:       Appearance: Normal appearance.   HENT:      Head: Normocephalic and atraumatic.   Eyes:      Extraocular Movements: Extraocular movements intact.   Cardiovascular:      Rate and Rhythm: Normal rate.      Heart sounds: Murmur heard.   Pulmonary:      Effort: Pulmonary effort is normal.   Abdominal:      General: Abdomen is flat.      Palpations: Abdomen is soft.   Musculoskeletal:         General: Normal range of motion.      Cervical back: Normal range of motion.   Skin:     General: Skin is warm and dry.      Capillary Refill: Capillary refill takes less than 2 seconds.      Coloration: Skin is not pale.   Neurological:      General: No focal deficit present.      Mental Status: He is alert.         Diagnostic Results: reviewed   [facility]RE 4/2024     [facility] CT chest  3/2024    [facility] TTE 1/2024  Preserve LVEF 55  Mild-mod LA dilation   Mitral leaflet and calcified, mixed mitosis appearing mitral leaflet chordal structures, slight tethering of the tip of the mitral leaflet, 2 mitral regurgitant jets central and the other eccentric and hugs of the left atrial wall of course the jets like this can quite be underestimated, mean gradient was 9.0 suggestive of moderate to severe mitral stenosis   Echo 10/2023    Left Ventricle: The left ventricle is normal in size. Mildly increased wall thickness. Normal wall motion. There is normal systolic function with a visually estimated ejection fraction of 60 - 65%. Ejection fraction by visual approximation is 63%. Global longitudinal strain is normal -19%. There is normal diastolic function.    Right Ventricle: Normal right ventricular cavity size. Systolic function is normal. TAPSE is 1.60 cm.    Left Atrium: Left atrium is moderately dilated.    Right Atrium: Right atrium is mildly dilated.    Aortic Valve: There is a bioprosthetic valve in the aortic position. It is reported to be a 23 mm . There is mild to moderate aortic regurgitation. RILEY 1.3 cm2, mean gardient 16 mmHg, DI 0.42.    Mitral Valve: Mildly thickened posterior leaflet. There is moderate to severe regurgitation.    IVC/SVC: Normal venous pressure at 3 mmHg.    Pericardium: There is a trivial effusion.    No significant change from Echo in 11/2022.  Assessment:   Severe MR   S/p mini AVR 2014   Plan:     CTS Attending Note:    I have personally taken the history and examined this patient and agree with the ANSHU's note as stated above.  58-year-old gentleman who underwent aortic valve replacement with a tissue prosthesis 10 years ago.  That valve is functioning well, but he now has severe mitral regurgitation and is symptomatic, with dyspnea on exertion, in particular on inclines.  I discussed his situation with him and his wife in detail.  He would like to have this addressed, but  needs to get some things organized prior to committing to a surgical date.  I recommended coronary angiography in the interim, and he will make arrangements to have this done with Dr. Miller.  Once his affairs are in order and he has completed the angiogram, we will select a date for reoperation.  Pending the results of the angiogram, at this time the operative plan will be mitral valve repair and re-replacement of the aortic valve.

## 2024-05-29 ENCOUNTER — TELEPHONE (OUTPATIENT)
Dept: TRANSPLANT | Facility: CLINIC | Age: 59
End: 2024-05-29
Payer: MEDICARE

## 2024-05-29 DIAGNOSIS — Z76.82 PATIENT ON WAITING LIST FOR KIDNEY TRANSPLANT: Primary | ICD-10-CM

## 2024-05-30 NOTE — TELEPHONE ENCOUNTER
"ON-CALL NOTE    [facility]# GHISLAINE [id number]    Notified by Jose Elias Mistry, , that Spike Estrella on list for  donor kidney. Reviewed patient status with Dr. Norris. Upon review of medical record, the following was identified as having potential safety issues for the transplant at this time and will require further assessment and resolution: Patient seen by Dr. Bowman on 24 for shortness of breath with exertion, chest pain, and fatigue.      Dr. Bowman writes: The patient "underwent aortic valve replacement with a tissue prosthesis 10 years ago.  That valve is functioning well, but he now has severe mitral regurgitation and is symptomatic, with dyspnea on exertion, in particular on inclines.  I discussed his situation with him and his wife in detail.  He would like to have this addressed, but needs to get some things organized prior to committing to a surgical date.  I recommended coronary angiography in the interim, and he will make arrangements to have this done with Dr. Miller.  Once his affairs are in order and he has completed the angiogram, we will select a date for reoperation.  Pending the results of the angiogram, at this time the operative plan will be mitral valve repair and re-replacement of the aortic valve."        On call note routed to listed coordinator for follow up.     "

## 2024-06-03 ENCOUNTER — TELEPHONE (OUTPATIENT)
Dept: TRANSPLANT | Facility: CLINIC | Age: 59
End: 2024-06-03
Payer: MEDICARE

## 2024-06-06 DIAGNOSIS — Z95.2 HX OF AORTIC VALVE REPLACEMENT: Primary | ICD-10-CM

## 2024-06-06 DIAGNOSIS — I34.0 SEVERE MITRAL INSUFFICIENCY: ICD-10-CM

## 2024-06-07 DIAGNOSIS — Z95.2 HX OF AORTIC VALVE REPLACEMENT: ICD-10-CM

## 2024-06-07 DIAGNOSIS — I34.0 SEVERE MITRAL INSUFFICIENCY: Primary | ICD-10-CM

## 2024-06-07 DIAGNOSIS — Z01.818 PRE-OP TESTING: ICD-10-CM

## 2024-06-07 DIAGNOSIS — R79.9 ABNORMAL FINDING OF BLOOD CHEMISTRY, UNSPECIFIED: ICD-10-CM

## 2024-06-07 DIAGNOSIS — R79.1 ABNORMAL COAGULATION PROFILE: ICD-10-CM

## 2024-06-07 DIAGNOSIS — R74.8 ABNORMAL LEVELS OF OTHER SERUM ENZYMES: ICD-10-CM

## 2024-07-22 ENCOUNTER — OFFICE VISIT (OUTPATIENT)
Dept: FAMILY MEDICINE | Facility: CLINIC | Age: 59
End: 2024-07-22
Payer: MEDICARE

## 2024-07-22 VITALS
OXYGEN SATURATION: 98 % | SYSTOLIC BLOOD PRESSURE: 130 MMHG | HEIGHT: 72 IN | WEIGHT: 188.38 LBS | BODY MASS INDEX: 25.52 KG/M2 | HEART RATE: 69 BPM | DIASTOLIC BLOOD PRESSURE: 82 MMHG

## 2024-07-22 DIAGNOSIS — M32.14 LUPUS NEPHRITIS: ICD-10-CM

## 2024-07-22 DIAGNOSIS — Z95.2 HX OF AORTIC VALVE REPLACEMENT: ICD-10-CM

## 2024-07-22 DIAGNOSIS — Z99.2 ESRD ON HEMODIALYSIS: ICD-10-CM

## 2024-07-22 DIAGNOSIS — E78.00 PURE HYPERCHOLESTEROLEMIA: ICD-10-CM

## 2024-07-22 DIAGNOSIS — N18.6 ESRD ON HEMODIALYSIS: ICD-10-CM

## 2024-07-22 DIAGNOSIS — I34.0 NONRHEUMATIC MITRAL VALVE REGURGITATION: ICD-10-CM

## 2024-07-22 DIAGNOSIS — I10 ESSENTIAL HYPERTENSION: Primary | Chronic | ICD-10-CM

## 2024-07-22 PROCEDURE — 99214 OFFICE O/P EST MOD 30 MIN: CPT | Mod: S$GLB,,, | Performed by: FAMILY MEDICINE

## 2024-07-22 PROCEDURE — G2211 COMPLEX E/M VISIT ADD ON: HCPCS | Mod: S$GLB,,, | Performed by: FAMILY MEDICINE

## 2024-07-22 PROCEDURE — 90677 PCV20 VACCINE IM: CPT | Mod: S$GLB,,, | Performed by: FAMILY MEDICINE

## 2024-07-22 PROCEDURE — G0009 ADMIN PNEUMOCOCCAL VACCINE: HCPCS | Mod: S$GLB,,, | Performed by: FAMILY MEDICINE

## 2024-07-22 NOTE — PATIENT INSTRUCTIONS
Get pneumonia vaccine today  Good luck with upcoming surgeries    Followup 6 months    Avoid dairy and avoid gluten - in evening, focus on fruits, vegetables, nuts, proteins if you are hungry

## 2024-07-22 NOTE — PROGRESS NOTES
[facility] - [place], [place]    Family Medicine Office Visit    Chief Complaint   Patient presents with    Follow-up        HPI:  chronic condition followup:    ESRD - still getting dialysis - transplant status  Hyperlipidemia stable on appropriate intensity statin therapy  History of aortic valve replacement - may have repeat procedure with both mitral and aortic valves with Dr. Bowman  Blood Pressure at goal on medication, with patient reporting prescription compliance    Cardiology - Dr. Miller  Nephrology - Dr. López (lupus nephritis)    ROS: as above    Vitals:    07/22/24 0756   BP: 130/82   BP Location: Right arm   Patient Position: Sitting   BP Method: Large (Manual)   Pulse: 69   SpO2: 98%   Weight: 85.5 kg (188 lb 6.4 oz)   Height: 6' (1.829 m)      Body mass index is 25.55 kg/m².      General:  AOx3, well nourished and developed in no acute distress  Eyes:  PERRLA, EOMI, vision intact grossly  ENT:  normal hearing, moist oral mucosa  Neck:  trachea midline with no masses or thyromegaly  Heart:  RRR, Intense S2 - holosystolic murmur, with possible high pitched diastolic murmur as well.  No edema noted, extremities warm and well perfused  Lungs:  clear to auscultation bilaterally with symmetric chest movement  Abdomen:  Soft, nontender, nondistended.  Normal bowel sounds  Musculoskeletal:  Normal gait.  Normal posture.  Normal muscular development with no joint swelling.  Neurological:  CN II-XII grossly intact. Symmetric strength and sensation  Psych:  Normal mood and affect.  Able to demonstrate good judgement and personal insight.      Assessment/Plan:    1. Essential hypertension    2. Hx of aortic valve replacement    3. Pure hypercholesterolemia    4. ESRD on hemodialysis    5. Lupus nephritis    6. Nonrheumatic mitral valve regurgitation         At goal  Stable, will be having revision upcoming  Stable on statin  Stable with dialysis, continue transplant  pursuit  As above  Revision/surgery upcoming    Visit today included increased complexity associated with the care of the episodic problem htn, hld, esrd, aortic valve addressed and managing the longitudinal care of the patient due to the serious and/or complex managed problem(s) htn, hld, esrd, aortic valve.

## 2024-07-24 ENCOUNTER — TELEPHONE (OUTPATIENT)
Dept: CARDIOTHORACIC SURGERY | Facility: CLINIC | Age: 59
End: 2024-07-24
Payer: MEDICARE

## 2024-07-24 NOTE — TELEPHONE ENCOUNTER
Called pt to review pre-op appointments which have been scheduled for August 6. Also reviewed medications. Will discuss cellcept dose with Dr. Bowman and nephrology. Pt verbalized understanding.

## 2024-07-25 ENCOUNTER — TELEPHONE (OUTPATIENT)
Dept: CARDIOTHORACIC SURGERY | Facility: CLINIC | Age: 59
End: 2024-07-25
Payer: MEDICARE

## 2024-07-25 NOTE — TELEPHONE ENCOUNTER
Spoke with Dr. Sebastian Roach at [facility] (pt's rheumatologist) who recommends decreasing cellcept from 500 mg bid to 250 mg bid effective today and through one week post op. Dr. Roach recommends resuming regular dose of cellcept 500 mg bid on August 15. Discussed this with Dr. Bowman. Notified pt of Dr. Roach's recommendations, pt is agreeable to plan, and pt verbalized understanding of all information.

## 2024-07-30 DIAGNOSIS — Z00.00 ENCOUNTER FOR MEDICARE ANNUAL WELLNESS EXAM: ICD-10-CM

## 2024-08-02 ENCOUNTER — TELEPHONE (OUTPATIENT)
Dept: CARDIOTHORACIC SURGERY | Facility: CLINIC | Age: 59
End: 2024-08-02
Payer: MEDICARE

## 2024-08-02 NOTE — TELEPHONE ENCOUNTER
Pt confirmed that he was still supposed to take half of cellcept. Confirmed this with him. He asked if he had to hold his lipitor, plavix, and metoprolol. Explained he should continue to make these medications as normal.    Confirmed surgery date and time. Reviewed pre-op appoiintments with patient.    Julie Haase RN  CTS Nurse Navigator [phone]        ----- Message from Carmen Peterson sent at 8/2/2024 10:29 AM CDT -----  Regarding: Pt called states rosamaria jones like to speak with Jamilah regarding medication  Contact: [phone]  Name of Who is Calling:YAMILET STEVENS [[id number]]        What is the request in detail:Pt called states rosamaria jones like to speak with Katelynn regarding medication. Please advise         Can the clinic reply by [facility]:No        What Number to Call Back if not in [facility]: Telephone Information:  Mobile          [phone]

## 2024-08-06 PROBLEM — I77.810 ASCENDING AORTA DILATATION: Status: ACTIVE | Noted: 2024-01-01

## 2024-08-06 PROBLEM — R74.8 ELEVATED ALKALINE PHOSPHATASE LEVEL: Status: ACTIVE | Noted: 2024-01-01

## 2024-08-06 PROBLEM — Z01.818 PRE-OP TESTING: Status: ACTIVE | Noted: 2024-01-01

## 2024-08-06 NOTE — H&P (VIEW-ONLY)
Subjective:      Patient ID: Spike Estrella is a 58 y.o. male.    Chief Complaint: No chief complaint on file.      HPI:  Spike Estrella is a 58 y.o. male who presents for pre-op. Medical conditions include s/p mini AVR by Dr. Bowman in 2014, Coronary artery disease , ESRD (end stage renal disease) on dialysis, being worked up for renal transplant , Hypertension, Lupus. He has known MR. Patient reports worsening fatigue and dyspnea on exertion with strenuous activities for the past year. Gets very winded when walking up hill and pushing a wheel barrel. Also notes frequent atypical chest pains that wakes him up at night, describes it as indigestion. There is associated SOB. He was recently placed on PPI with mild relief. Denies orthopnea, PND, palpitations, LE edema. No prior strokes, seizure, stents. Has had one sternotomy. Independently perform ADLS without issues. No AD for walking. Denies tobacco, ETOH, illicit drug use.     FEV1 92.2  DLCO 54.9    Regarding Cellcept dosing: Decrease dose from 500mg to 250mg BID for 1 week after surgery. Ok to go back to 500mg BID on August 15th.       Family and social history reviewed    Current Outpatient Medications   Medication Instructions    ascorbic acid (vitamin C) (VITAMIN C) 500 mg, Oral    atorvastatin (LIPITOR) 20 mg, Oral, Nightly    clopidogreL (PLAVIX) 75 mg, Oral    fluticasone-umeclidin-vilanter (TRELEGY ELLIPTA) 200-62.5-25 mcg inhaler 1 puff, Inhalation, Daily    folic acid/vit B complex and C (NEPHRO-ADAMARIS ORAL) Oral    iron polysaccharide complex, vit c-sa (FERREX 150 PLUS) 150-50-50 mg Cap capsule 1 capsule, Oral    loratadine (CLARITIN) 10 mg, Oral, Daily    metoprolol succinate (TOPROL-XL) 25 mg, Oral    mycophenolate (CELLCEPT) 1,000 mg, Oral, 2 times daily    NEPHRO VITAMINS 0.8 mg Tab 1 tablet, Oral    pantoprazole (PROTONIX) 40 MG tablet = 1 tab, Oral, Daily, # 90 tab, 2 Refill(s), Maintenance, Pharmacy: [facility], 185.4, cm, 04/02/24  13:27:00 CDT, Height/Length Measured, 88.7, kg, 04/02/24 13:27:00 CDT, Weight Dosing    pantoprazole (PROTONIX) 40 mg, Oral, Daily    SEVELAMER CARBONATE ORAL 800 mg, Oral, 3 times daily with meals    traMADoL (ULTRAM) 50 mg, Oral, Every 8 hours PRN    VENOFER 200 mg, Intravenous    vitamin D (VITAMIN D3) 1,000 Units, Oral, Daily         Review of patient's allergies indicates:   Allergen Reactions    Amlodipine Swelling    Adhesive     Iodine     Codeine Anxiety     Past Medical History:   Diagnosis Date    Anemia of renal disease     Essential hypertension     Hx of aortic valve replacement     Immunosuppressed status     Lupus nephritis     Metabolic acidosis     Personal history of colonic polyps     Secondary hyperparathyroidism of renal origin     Stage 4 chronic kidney disease     Stenosis and insufficiency of lacrimal passages     Systemic lupus erythematosus      Past Surgical History:   Procedure Laterality Date    AORTIC VALVE REPLACEMENT  06/16/2014    porcine valve replacement    CARDIAC VALVE REPLACEMENT  06/2014    TONSILLECTOMY       Family History       Problem Relation (Age of Onset)    Cancer Father    Coronary artery disease Maternal Uncle    Hypertension Mother    Lymphoma Father    No Known Problems Brother, Brother, Brother, Brother    Valvular heart disease Mother          Social History     Socioeconomic History    Marital status:    Tobacco Use    Smoking status: Never     Passive exposure: Never    Smokeless tobacco: Former     Types: Snuff, Chew     Quit date: 1997   Substance and Sexual Activity    Alcohol use: Yes     Alcohol/week: 1.0 standard drink of alcohol     Types: 1 Cans of beer per week     Comment: occasionally     Drug use: No    Sexual activity: Yes     Partners: Female     Birth control/protection: None   Social History Narrative    Lives with wife and son     Working as a       Social Determinants of Health     Physical Activity: Unknown (7/30/2024)     Exercise Vital Sign     Days of Exercise per Week: 0 days   Stress: Stress Concern Present (7/30/2024)    [employer] - Occupational Stress Questionnaire     Feeling of Stress : To some extent       Current medications Reviewed    Review of Systems   Constitutional:  Positive for fatigue. Negative for activity change, appetite change and fever.   HENT:  Negative for nosebleeds.    Respiratory:  Positive for shortness of breath. Negative for cough.    Cardiovascular:  Positive for chest pain. Negative for palpitations and leg swelling.   Gastrointestinal:  Negative for abdominal distention, abdominal pain and nausea.   Genitourinary:  Negative for frequency.   Musculoskeletal:  Negative for arthralgias and myalgias.   Skin:  Negative for rash.   Neurological:  Negative for dizziness and numbness.   Hematological:  Does not bruise/bleed easily.      Objective:   Physical Exam  Constitutional:       Appearance: Normal appearance.   HENT:      Head: Normocephalic and atraumatic.   Eyes:      Extraocular Movements: Extraocular movements intact.   Cardiovascular:      Rate and Rhythm: Normal rate.   Pulmonary:      Effort: Pulmonary effort is normal.   Musculoskeletal:         General: Normal range of motion.      Cervical back: Normal range of motion.   Skin:     Coloration: Skin is not pale.   Neurological:      General: No focal deficit present.      Mental Status: He is alert.     Diagnostic Results: reviewed   Carotids WNL     TTE 8/6/24    Left Ventricle: The left ventricle is normal in size. Ventricular mass is normal. Normal wall thickness. Normal wall motion. There is normal systolic function with a visually estimated ejection fraction of 60 - 65%. Diastolic function cannot be reliably determined in the presence of mitral valve disease.    Right Ventricle: Mild right ventricular enlargement. Wall thickness is normal. Right ventricle wall motion has global hypokinesis. Systolic  function is mildly reduced.    Left Atrium: Left atrium is severely dilated.    Right Atrium: Right atrium is dilated.    Aortic Valve: There is a 23mm bioprosthetic valve in the aortic position. Elevated prosthetic gradient. Aortic valve area by VTI is 2.90 cm². Aortic valve peak velocity is 3.4 m/s. The dimensionless index is 0.79. The LVOT velocity is elevated, unable to estimate the mean aortic gradient. The estimated peak aortic gradient is 33 mmHg. There is moderate aortic regurgitation.    Mitral Valve: Moderately thickened leaflets. Moderately calcified anterior leaflet. There is mitral annular calcification present. Mildly calcified subvalvular apparatus. Mildly restricted motion. The mean pressure gradient across the mitral valve is 9 mmHg at a heart rate of 72 bpm. There is severe regurgitation with a posterolateral eccentriccally directed jet.    Pulmonary Artery: The estimated pulmonary artery systolic pressure is 37 mmHg.    IVC/SVC: Intermediate venous pressure at 8 mmHg.    CT Chest 8/6/24  1. Stable right upper lobe and right lower lobe peripheral patchy areas of ground-glass and interlobular septal thickening.  2. Unchanged right upper lobe nodule.  No new nodules.  3. Unchanged several subcentimeter mediastinal nodes.  4. Additional incidental findings are stable    Lexiscan 12/1/23    Normal myocardial perfusion scan. There is no evidence of myocardial ischemia or infarction.    There is a  moderate intensity fixed perfusion abnormality in the inferior wall of the left ventricle secondary to diaphragm attenuation.    The gated perfusion images showed an ejection fraction of 58% at rest. The gated perfusion images showed an ejection fraction of 68% post stress. Normal ejection fraction is greater than 47%.    There is normal wall motion at rest and post stress.    LV cavity size is moderately enlarged at rest and moderately enlarged at stress.    The ECG portion of the study is negative for  ischemia.    The patient reported no chest pain during the stress test.    There were no arrhythmias during stress.    There are no prior studies for comparison.      Assessment:   TAA   MR   Plan:     CTS Attending Note:    I have personally taken the history and examined this patient and agree with the ANSHU's note as stated above.  58-year-old gentleman with previous minimally invasive aortic valve replacement in 2014.  He now has severe mitral regurgitation as well as some degree of stenosis.  He is quite symptomatic for this.  We plan redo sternotomy with repair versus replacement of the mitral valve and replacement of the aortic prosthesis.  I reviewed his preoperative CT scan.  He also has a dilated ascending aorta.  I recommended ascending aortic replacement as well.  He agreed with this.  We will plan for a tissue valve in the aortic position as well as a tissue valve in the mitral position if repair is not feasible.  He agreed with this as well.  I reviewed his coronary angiogram.  He does not have hemodynamically significant lesions. We discussed the risks and benefits of the proposed procedure, including but not limited to death, stroke, respiratory complications, arrythmia, need for permanent pacemaker, and infection. We discussed the STS predicted risk. His questions have been answered, and he wishes to proceed.

## 2024-08-07 NOTE — PROGRESS NOTES
Pharmacist Renal Dose Adjustment Note    Spike Estrella is a 58 y.o. male being treated with the medication Famotidine    Patient Data:    Vital Signs (Most Recent):  Temp: 98.6 °F (37 °C) (08/07/24 0649)  Pulse: 80 (08/07/24 0649)  Resp: 20 (08/07/24 0649)  BP: (!) 162/78 (08/07/24 0649)  SpO2: 100 % (08/07/24 0649) Vital Signs (72h Range):  Temp:  [98.6 °F (37 °C)]   Pulse:  [69-80]   Resp:  [20]   BP: (145-162)/(70-78)   SpO2:  [99 %-100 %]      Recent Labs   Lab 08/06/24  1048   CREATININE 6.3*     Serum creatinine: 6.3 mg/dL (H) 08/06/24 1048  Estimated creatinine clearance: 14.1 mL/min (A)    Medication:Famotidine dose: 20 mg frequency BID will be changed to medication:Famotidine dose:20 mg frequency:Q 24 H    Pharmacist's Name: Tyree Galeano  Pharmacist's Extension: [phone]

## 2024-08-07 NOTE — PROGRESS NOTES
Pharmacist Renal Dose Adjustment Note    Spike Estrella is a 58 y.o. male being treated with the medication Cefazoin    Patient Data:    Vital Signs (Most Recent):  Temp: 98.6 °F (37 °C) (08/07/24 0649)  Pulse: 80 (08/07/24 0649)  Resp: 20 (08/07/24 0649)  BP: (!) 162/78 (08/07/24 0649)  SpO2: 100 % (08/07/24 0649) Vital Signs (72h Range):  Temp:  [98.6 °F (37 °C)]   Pulse:  [69-80]   Resp:  [20]   BP: (145-162)/(70-78)   SpO2:  [99 %-100 %]      Recent Labs   Lab 08/06/24  1048   CREATININE 6.3*     Serum creatinine: 6.3 mg/dL (H) 08/06/24 1048  Estimated creatinine clearance: 14.1 mL/min (A)    Medication:Cefazolin dose: 2 G frequency Q 8 H will be changed to medication:Cefazolin dose:2 G frequency:Q 12 H    Pharmacist's Name: Tyree Galeano  Pharmacist's Extension: [phone]

## 2024-08-07 NOTE — INTERVAL H&P NOTE
The patient has been examined and the H&P has been reviewed:    I concur with the findings and no changes have occurred since H&P was written.    Surgery risks, benefits and alternative options discussed and understood by patient/family.          Active Hospital Problems    Diagnosis  POA    Ascending aorta dilatation [I77.810]  Yes    Elevated alkaline phosphatase level [R74.8]  Yes    Nonrheumatic mitral valve regurgitation [I34.0]  Yes    Drug-induced immunodeficiency [D84.821, Z79.899]  Yes    Degenerative disc disease, lumbar [M51.36]  Yes    Pure hypercholesterolemia [E78.00]  Yes    ESRD on hemodialysis [N18.6, Z99.2]  Not Applicable    Anemia of renal disease [N18.9, D63.1]  Yes     Chronic    Secondary hyperparathyroidism of renal origin [N25.81]  Yes     Chronic    Hx of aortic valve replacement [Z95.2]  Not Applicable    Essential hypertension [I10]  Yes     Chronic    SLE (systemic lupus erythematosus) [M32.9]  Yes      Resolved Hospital Problems   No resolved problems to display.

## 2024-08-07 NOTE — HPI
Spike Estrella is a 58 y.o. M with pmh of AS s/p mini AVR by Dr. Bowman in 2014, CAD, ESRD on HD (being worked up for renal transplant), HTN, Lupus, and severe MR    The patient presents to the SICU s/p mitral valve replacement, aortic valve replacement, and ascending aorta replacement with Dr. Bowman on 08/07/24.   On admission, they are intubated, sedated with propofol, and in stable condition. Inotropic and vasopressor requirements upon admission are 0.1 mcg/kg/min of epinephrine, 0.2 mcg/kg/min of norepinephrine, 0.08 units/min of vasopressin, and giapreza 80 ng/kg/min to maintain blood pressure at a MAP 60. Central access includes a [facility] CVC x2, arterial access includes a left radial arterial line.      Intraoperatively, they received 3L of crystalloid, 1800cc of cell saver, 2 PRBCs, 8 FFP, 2 platelets, and 2 cryoprecipitate. Urine output intraoperatively was 225cc.      Immediate post-operative plans include hemodynamic stabilization and weaning of cardiac and respiratory support. Plan to wean vasopressors and inotropes as tolerated, wean ventilator support, and closely monitor fluid status with strict Is/Os and continued fluid resuscitation as needed.

## 2024-08-07 NOTE — PROGRESS NOTES
Autotransfusion/Rapid Infusion Record:      08/07/2024  Autotransfusionist:  Tod Morocho    Surgeons and Role:     * Waqar Bowman MD - Primary     * Miles Nagy DO - Fellow     * Umm Cooper MD - Fellow  Anesthesiologist:  Dhaval Viramontes Jr., MD    Past Medical History:   Diagnosis Date    Anemia of renal disease     Essential hypertension     Hx of aortic valve replacement     Immunosuppressed status     Lupus nephritis     Metabolic acidosis     Personal history of colonic polyps     Secondary hyperparathyroidism of renal origin     Stage 4 chronic kidney disease     Stenosis and insufficiency of lacrimal passages     Systemic lupus erythematosus        Procedure(s) (LRB):  REPLACEMENT, AORTIC VALVE, WITH REPEAT STERNOTOMY (N/A)  REPLACEMENT, MITRAL VALVE (N/A)  REPLACEMENT, AORTA, ASCENDING (N/A)     6:18 PM    Equipment:    Cell Saver     R.I.S.  : [employer]-IO Model: CATSmart or CATSplus : [employer]   Model: [id number]     Serial number: [id number]   Serial number: n/a   Disposable lot #: [id number]   Disposable lot #: n/a     Were extra cardiotomies used for cell saver?  no   if yes, #:  n/a    Solutions:  Anticoagulant: ACD-A   Expiration date: 06/25 Volume used: 1800 ml   Wash solution: 0.9% NaCl   Expiration date: 06/26 Volume used: 7603 ml     Cell saver checklist  Time completed:           [x]   Circuit assembled correctly     [x]   Cell saver powered and operational     [x]   Vacuum connected, functional, adjust to max -150mmHg     [x]   Anticoagulant drip rate adjusted     [x]   Transfer bag properly labeled with patient name, expiration time, volume,       anticoagulant, OR number, and initials     [x]   Cell saver disinfected after use (completed at end of case)       Cell Saver volumes:    Total volume processed:     7865 mL     Total volume pRBCs recovered     2304 mL     Volume pRBCs infused     2304 mL         RIS checklist   Time completed:  []   RIS circuit assembled  correctly     []   RIS power and operational     []   RIS disinfected after use (completed at end of case)       RIS volumes:    Total volume infused:    (see anesthesia record for blood       product information)   N/a mL       Additional comments:

## 2024-08-07 NOTE — ASSESSMENT & PLAN NOTE
Mr. Estrella is a 59 yo male who is s/p mitral valve replacement, aortic valve replacement, ascending aorta replacement on 08/07/24 with Dr. Bowman.      Neuro/Psych:     - Sedation: propofol    - Pain:    - Scheduled Tylenol 1g q8h   - Fentanyl PRN while intubated, Oxy PRN             Cardiac:     - S/P mitral valve replacement, aortic valve replacement, ascending aorta replacement with Dr. Bowman on 8/7    - MAP Goal: 60-80    - Pressors: epi 0.1, vaso 0.08, levo 0.2, elham 80    - Anti-HTNs: Will start when appropriate    - Rhythm: NSR    - Beta blocker: Will start when appropriate    - Statin: Atorvastatin 40 mg QD      Pulmonary:     - Goal SpO2 >92%    - Will wean ventilator support as tolerated, currently on FiO2 100%, PEEP 10    - Chest Tubes x 4 (2 Meds & 2 Pleural)    - ABGs Q1H    - Kristian 20 ppm      Renal:    - Trend BUN/Cr     - Maintain Michel, record strict Is/Os    - ESRD on HD, being evaluated for renal transplant    - nephrology consulted, will start SLED for metabolic acidosis    Recent Labs   Lab 08/06/24  1048 08/07/24 1953   BUN 44* 36*   CREATININE 6.3* 5.9*         FEN / GI:     - Daily CMP, PRN K/Mag/Phos per protocol     - Replace electrolytes as needed    - Nutrition: NPO    - Bowel Regimen: Miralax, docusate      ID:     - Afebrile    - WBC stable    - Abx: Complete perioperative cefazolin 2g Q8H x 5 doses    Recent Labs   Lab 08/06/24  1048 08/07/24  1553 08/07/24 1953   WBC 9.76 21.39* 17.65*         Heme/Onc:     - Hgb 12.8 pre-operatively    - CBC daily    - will hold ASA tonight POD 0    Recent Labs   Lab 08/06/24  1048 08/07/24  1553 08/07/24 1953   HGB 12.8* 7.3* 11.1*    66* 102*   APTT 31.9  --  51.1*   INR 1.0 2.4* 1.3*         Endocrine:     - CTS Goal -140    - HgbA1c: 5.3    - Endocrinology consulted for insulin management      PPx:   Feeding: NPO  Analgesia/Sedation:  Thromboembolic Prevention: n/a  HOB >30: Yes  Stress Ulcer: famotidine  Glucose Control: Yes,  insulin management per Endocrinology  Bowel reg: miralax, docusate  Invasive Lines/Drains/Airway:    ETT   Right radial arterial line   RIJ trialysis   RIJ Quad lumen   Michel   Chest Tubes: 4 (2 Mediastinal, 2 Pleural)    Pacing Wires: Temporary ventricular pacing wires  Deescalation: dc'ed     Dispo/Code Status/Palliative:     - Continue SICU Care    - Full Code

## 2024-08-07 NOTE — HPI
Spike is a pleasant 59 yo man w pmhx significant for mini AVR by Dr Bowman in 2014, coronary artery disease, end-stage renal disease on dialysis secondary to lupus nephritis w fistula and angioplasty 11/21/23, hypertension, mitral regurg who is in the hospital for sternotomy with repair versus replacement of mitral valve and replacement of the aortic prosthesis and ascending aortic replacement. Nephrology team has been consulted for management of his dialysis while he is admitted in the hospital. At the time of seeing the patient he was intubated and sedated, and therefore information and consent for dialysis was obtained from family in the waiting room. He does home dialysis four days per week, always on Sunday/Thursday, and the other two days are Monday Tuesday or Wednesday. His last dialysis session was Monday. He normally uses his graft on his L arm. During surgery he received multiple units of blood products and fluids, pH was noted to trend down to 7.055, appeared to be in resp and metabolic acidosis w lactic up to 9.53, on four pressors at time of interview w MAP in the 50s, improved at this time w bicarb pushes. However he appears to be developing significant pulmonary edema/ARDs (was put on nitro drip) and we have been requested for emergent dialysis. 200 cc UOP during surgery. Trialysis in R IJ placed. Labs from yesterday show a creatinine of 6.3, potassium 3.9, sodium 142, BUN 44.  Intraoperative labs show elevated lactate and hemoglobin down to 7.3 w WBC up to 21.

## 2024-08-08 NOTE — PLAN OF CARE
"      SICU PLAN OF CARE NOTE    Dx: Nonrheumatic mitral valve regurgitation    Shift Events: Pt arrived to SICU post MVR/AVR/Ascending Aorta replacement around 2000. Increased pressors requirements, multiple blood products given due to excessive output from chest tubes and saturated chest tube dressings. Multiple amps of Bicarb given for acidosis, SLED started at first but BP couldn't tolerated, PRISMAX started at 0500 with better tolerance. Declined in neuro status this AM, no neuro reflex since OR since pt didn't received reversal agent from paralytics, pupils were dilated, non-reactive. MD aware and following. CTS at bedside this AM to attempt for Femoral [place] for better vasopressors titrations, Pt went into V-tach as procedure went on, HR in 110s, pulsatile, 1 gram CaCl given with some improvement.     Goals of Care: MAP 60-80    Neuro: Unresponsive    Vital Signs: BP (!) 120/55   Pulse 92   Temp 97.2 °F (36.2 °C)   Resp (!) 28   Ht 6' 1" (1.854 m)   Wt 86.6 kg (191 lb)   SpO2 (!) 80%   BMI 25.20 kg/m²     Cardiac: SR, CVP 8-10.    Respiratory: Ventilator AC/% FiO2, 12 PEEP.    Diet: NPO    Gtts: Propfol, Insulin, Norepinephrine, Vasopressin, Epinephrine, and Angiotensin    Urine Output: Urinary Catheter 0-5 cc/hour, Pt on CRRT    Drains: Chest Tube, total output 250 and 850 cc /  shift for Meds and pleural chest tubes.     Labs/Accuchecks: q1h Abgs/accucheck.    Skin: Pt turned q2h, foam dressing applied to bony prominences, altered skin integrity noted, picture taken.         "

## 2024-08-08 NOTE — SUBJECTIVE & OBJECTIVE
Follow-up For: Procedure(s) (LRB):  REPLACEMENT, AORTIC VALVE, WITH REPEAT STERNOTOMY (N/A)  REPLACEMENT, MITRAL VALVE (N/A)  REPLACEMENT, AORTA, ASCENDING (N/A)    Post-Operative Day: Day of Surgery     Past Medical History:   Diagnosis Date    Anemia of renal disease     Essential hypertension     Hx of aortic valve replacement     Immunosuppressed status     Lupus nephritis     Metabolic acidosis     Personal history of colonic polyps     Secondary hyperparathyroidism of renal origin     Stage 4 chronic kidney disease     Stenosis and insufficiency of lacrimal passages     Systemic lupus erythematosus        Past Surgical History:   Procedure Laterality Date    AORTIC VALVE REPLACEMENT  06/16/2014    porcine valve replacement    CARDIAC VALVE REPLACEMENT  06/2014    TONSILLECTOMY         Review of patient's allergies indicates:   Allergen Reactions    Amlodipine Swelling    Adhesive     Iodine     Codeine Anxiety       Family History       Problem Relation (Age of Onset)    Cancer Father    Coronary artery disease Maternal Uncle    Hypertension Mother    Lymphoma Father    No Known Problems Brother, Brother, Brother, Brother    Valvular heart disease Mother          Tobacco Use    Smoking status: Never     Passive exposure: Never    Smokeless tobacco: Former     Types: Snuff, Chew     Quit date: 1997   Substance and Sexual Activity    Alcohol use: Yes     Alcohol/week: 1.0 standard drink of alcohol     Types: 1 Cans of beer per week     Comment: occasionally     Drug use: No    Sexual activity: Yes     Partners: Female     Birth control/protection: None      Review of Systems   Unable to perform ROS: Intubated     Objective:     Vital Signs (Most Recent):  Temp: 98.2 °F (36.8 °C) (08/07/24 2000)  Pulse: 80 (08/07/24 2029)  Resp: (!) 22 (08/07/24 2029)  BP: (!) 162/78 (08/07/24 0649)  SpO2: (!) 91 % (08/07/24 2029) Vital Signs (24h Range):  Temp:  [98.2 °F (36.8 °C)-98.6 °F (37 °C)] 98.2 °F (36.8 °C)  Pulse:   [78-80] 80  Resp:  [16-22] 22  SpO2:  [79 %-100 %] 91 %  BP: (162)/(78) 162/78     Weight: 86.6 kg (191 lb)  Body mass index is 25.2 kg/m².      Intake/Output Summary (Last 24 hours) at 8/7/2024 2125  Last data filed at 8/7/2024 2100  Gross per 24 hour   Intake 6173 ml   Output 225 ml   Net 5948 ml          Physical Exam  Constitutional:       General: He is not in acute distress.     Appearance: Normal appearance.   HENT:      Head: Normocephalic and atraumatic.   Cardiovascular:      Pulses: Normal pulses.      Comments: CT x4--2 meds, 2 pleural  R IJ CVC  R IJ Quad lumen  R radial arterial line  Temporary V wires in place  Pulmonary:      Comments: Intubated  Abdominal:      General: Abdomen is flat. There is no distension.      Palpations: Abdomen is soft.   Genitourinary:     Comments: Michel in place  Musculoskeletal:         General: Normal range of motion.      Cervical back: Normal range of motion.   Skin:     General: Skin is warm and dry.   Neurological:      General: No focal deficit present.            Vents:  Vent Mode: A/C (08/07/24 2000)  Ventilator Initiated: Yes (08/07/24 1939)  Set Rate: 22 BPM (08/07/24 2000)  Vt Set: 600 mL (08/07/24 2000)  PEEP/CPAP: 10 cmH20 (08/07/24 2000)  Oxygen Concentration (%): 100 (08/07/24 2000)  Peak Airway Pressure: 33 cmH20 (08/07/24 2000)  Plateau Pressure: 24 cmH20 (08/07/24 2000)  Total Ve: 13.8 L/m (08/07/24 2000)  Negative Inspiratory Force (cm H2O): 0 (08/07/24 2000)  F/VT Ratio<105 (RSBI): (!) 35.09 (08/07/24 2000)    Lines/Drains/Airways       Central Venous Catheter Line  Duration                  Hemodialysis Catheter 08/07/24 0946 <1 day         Percutaneous Central Line Insertion/Assessment - Quad lumen  08/07/24 0948 Internal Jugular Right <1 day    Percutaneous Central Line - Quad Lumen  08/07/24 0948 Internal Jugular Right <1 day              Drain  Duration                  Urethral Catheter 08/07/24 0841 Non-latex;Straight-tip;Temperature probe 16  Fr. <1 day         Y Chest Tube 1 and 2 08/07/24 1 Right Pleural 19 Fr. Left Pleural 19 Fr. <1 day         Y Chest Tube 3 and 4 08/07/24 3 Right Mediastinal 19 Fr. 4 Left Mediastinal 19 Fr. <1 day              Arterial Line  Duration             Arterial Line 08/07/24 0944 Right Radial <1 day              Line  Duration                  Pacer Wires 08/07/24 1017 <1 day              Peripheral Intravenous Line  Duration                  Peripheral IV - Single Lumen 09/17/19 0812 22 G Right Forearm 1786 days         Peripheral IV - Single Lumen 08/07/24 0657 18 G Anterior;Distal;Right Forearm <1 day                    Significant Labs:    CBC/Anemia Profile:  Recent Labs   Lab 08/06/24  1048 08/07/24  0856 08/07/24  1553 08/07/24  1614 08/07/24 1951 08/07/24 1953 08/07/24 2048   WBC 9.76  --  21.39*  --   --  17.65*  --    HGB 12.8*  --  7.3*  --   --  11.1*  --    HCT 41.6   < > 22.7*   < > 32* 35.9* 31*     --  66*  --   --  102*  --    MCV 96  --  93  --   --  97  --    RDW 14.0  --  14.4  --   --  15.0*  --     < > = values in this interval not displayed.        Chemistries:  Recent Labs   Lab 08/06/24  1048 08/07/24 1953    149*   K 3.9 3.7  3.6    113*   CO2 26 12*   BUN 44* 36*   CREATININE 6.3* 5.9*   CALCIUM 9.8 11.5*   ALBUMIN 3.9 1.9*   PROT 7.2 3.8*   BILITOT 0.7 0.9   ALKPHOS 170* 67   ALT 19 63*   AST 31 140*   MG  --  3.1*   PHOS  --  5.3*           Significant Imaging: I have reviewed all pertinent imaging results/findings within the past 24 hours.

## 2024-08-08 NOTE — PROGRESS NOTES
Anesthesia at bedside for OR transport.  Intubated. AC VC+, 100% FiO2, Peep 12, Rate 28.    Kristian 40 ppm  Norepinephrine 0.1 mcg/kg/min, Epi 0.3 mcg/kg/min, Vasopressin 0.08, Sodium bicarb 200 ml/hr, and 2 g Calcium.  1 PRBCs infusing.   MAP > 65.   Family in waiting room.

## 2024-08-08 NOTE — PROGRESS NOTES
08/07/24 2253   Treatment   Treatment Type SLED   Treatment Status Discontinued treatment;Blood returned;Other (Comment)  (BP unstable)   Dialyzer Time (hours) 0.36   CRRT Comments Blood returned by ICU RN; pt unable to tolerate

## 2024-08-08 NOTE — CONSULTS
[facility] - [facility]  Endocrinology  Diabetes Consult Note    Consult Requested by: Waqar Bowman MD   Reason for admit: Nonrheumatic mitral valve regurgitation    HISTORY OF PRESENT ILLNESS:  Reason for Consult: Management of Hyperglycemia     Surgical Procedure and Date: s/p mitral valve replacement, aortic valve replacement, and ascending aorta replacement  on 8/7    Patient is not diabetic and does not currently take any oral/injectable antidiabetic/hypoglycemic medications.     Hemoglobin A1C   Date Value Ref Range Status   08/06/2024 5.3 4.0 - 5.6 % Final     Comment:     ADA Screening Guidelines:  5.7-6.4%  Consistent with prediabetes  >or=6.5%  Consistent with diabetes    High levels of fetal hemoglobin interfere with the HbA1C  assay. Heterozygous hemoglobin variants (HbS, HgC, etc)do  not significantly interfere with this assay.   However, presence of multiple variants may affect accuracy.     06/12/2014 5.4 4.5 - 6.2 % Final          HPI: Spike Estrella is a 58 y.o. M with pmh of AS s/p mini AVR by Dr. Bowman in 2014, CAD, ESRD on HD (being worked up for renal transplant), HTN, Lupus, and severe MR. The patient presents to the SICU s/p mitral valve replacement, aortic valve replacement, and ascending aorta replacement with Dr. Bowman on 08/07/24. Endocrine consulted to manage hyperglycemia in the context of CTS.           Interval HPI:   Overnight events: Patient in room 18835/12163 A. Blood glucose stable. BG at goal on current insulin regimen (IIP). Steroid use- Hydrocortisone  100 mg TID. Day of Surgery  Renal function- Abnormal - Creatinine 3.0   Vasopressors-  Epinephrine  and Norepinephrine       Endocrine will continue to follow and manage insulin orders inpatient.         Diet NPO     Eating:   NPO  Nausea: No  Hypoglycemia and intervention: No  Fever: No  TPN and/or TF: No    PMH, PSH, FH, SH updated and reviewed     ROS:  Review of Systems  SHAVON due to intubation and  sedation.     Current Medications and/or Treatments Impacting Glycemic Control  Immunotherapy:    Immunosuppressants       None          Steroids:   Hormones (From admission, onward)      Start     Stop Route Frequency Ordered    08/08/24 0530  hydrocortisone sodium succinate injection 100 mg         -- IV Every 8 hours 08/08/24 0525    08/07/24 2145  angiotensin II (GIAPREZA) 2,500,000 ng in 0.9% NaCl 250 mL infusion        Question Answer Comment   Begin at (in ng/kg/min) 80    Titrate by: (in ng/kg/min) 10    Titrate interval: (in minutes) 5    Titrate to maintain: (MAP or SBP) MAP    Greater than: (in mmHg) 60    Maximum dose: (in ng/kg/min) 80    Name of Attending Provider giving approval: Colby    Is the patient currently receiving norepinephrine >0.2 mcg/kg/min (or equivalent) AND vasopressin 0.04 units/min? Yes    Angiotensin II Infusion Adjustment (DO NOT MODIFY ANSWER) [email or web address] II Titration [email or web address]        -- IV Continuous 08/07/24 2035 08/07/24 2015  vasopressin (PITRESSIN) 0.2 Units/mL in D5W 100 mL infusion         -- IV Continuous 08/07/24 1911          Pressors:    Autonomic Drugs (From admission, onward)      Start     Stop Route Frequency Ordered    08/08/24 1115  EPINEPHrine 30 mg in 250 mL D5W (READY TO MIX) (PHARMACIST USE ONLY) infusion        Question Answer Comment   Begin at (in mcg/kg/min): 0.02    Titrate by: (in mcg/kg/min) 0.02    Titrate interval: (in minutes) 5    Titrate to maintain: (SBP or MAP or Cardiac Index) MAP    Greater than: (in mmHg) 65    Cardiac index greater than: (in L/min) 2.2    Maximum dose: (in mcg/kg/min) 2        -- IV Continuous 08/08/24 1015    08/08/24 1045  NORepinephrine 32 mg in D5W 250 mL infusion        Question Answer Comment   Begin at (in mcg/kg/min): 0.08    Titrate by: (in mcg/kg/min (RANGE PREFERRED) 0.02 - 0.2    Titrate interval: (in minutes) (RANGE PREFERRED) 2 - 5    Titrate to maintain: (MAP or SBP) MAP   "  Titrate to keep MAP within the range or GREATER than (if single number): (in mmHg) OTHER    Other 60    Maximum dose: (in mcg/kg/min) 3        -- IV Continuous 08/08/24 1039          Hyperglycemia/Diabetes Medications:   Antihyperglycemics (From admission, onward)      Start     Stop Route Frequency Ordered    08/08/24 1012  insulin aspart U-100 pen 0-10 Units         -- SubQ Every 4 hours PRN 08/08/24 0913             PHYSICAL EXAMINATION:  Vitals:    08/08/24 1401   BP:    Pulse: 79   Resp: (!) 28   Temp: 97.7 °F (36.5 °C)     Body mass index is 25.2 kg/m².     Physical Exam  Constitutional:       Appearance: He is well-developed. He is ill-appearing.      Interventions: He is sedated and intubated.   HENT:      Head: Normocephalic.   Eyes:      Conjunctiva/sclera: Conjunctivae normal.   Pulmonary:      Effort: Pulmonary effort is normal. He is intubated.   Skin:     General: Skin is warm.      Findings: No rash.            Labs Reviewed and Include   Recent Labs   Lab 08/08/24  1113 08/08/24  1340    78   CALCIUM 9.7 11.5*   ALBUMIN 2.7* 2.5*   PROT 4.7*  --     141   K 5.3* 6.6*   CO2 10* 7*    106   BUN 16 17   CREATININE 2.8* 3.1*   ALKPHOS 79  --    ALT 4,114*  --    AST 5,012*  --    BILITOT 2.2*  --      Lab Results   Component Value Date    WBC 7.86 08/08/2024    HGB 7.1 (L) 08/08/2024    HCT 19 (LL) 08/08/2024    MCV 94 08/08/2024     (L) 08/08/2024     No results for input(s): "TSH", "FREET4" in the last 168 hours.  Lab Results   Component Value Date    HGBA1C 5.3 08/06/2024       Nutritional status:   Body mass index is 25.2 kg/m².  Lab Results   Component Value Date    ALBUMIN 2.5 (L) 08/08/2024    ALBUMIN 2.7 (L) 08/08/2024    ALBUMIN 2.7 (L) 08/08/2024     No results found for: "PREALBUMIN"    Estimated Creatinine Clearance: 28.6 mL/min (A) (based on SCr of 3.1 mg/dL (H)).    Accu-Checks  Recent Labs     08/08/24  0404 08/08/24  0420 08/08/24  0517 08/08/24  0608 " 08/08/24  0710 08/08/24  0753 08/08/24  0906 08/08/24  1010 08/08/24  1132 08/08/24  1348   POCTGLUCOSE 89 69* 85 100 121* 104 129* 147* 125* 88        ASSESSMENT and PLAN    Cardiac/Vascular  * Nonrheumatic mitral valve regurgitation  Managed per primary team  Avoid hypoglycemia        Renal/  ESRD on hemodialysis  Titrate insulin slowly to avoid hypoglycemia as the risk of hypoglycemia increases with decreased creatinine clearance.  Estimated Creatinine Clearance: 28.6 mL/min (A) (based on SCr of 3.1 mg/dL (H)).        Endocrine  Hyperglycemia  Endocrinology consulted for BG management.   BG goal 110-140 CTS    - D/C IIP  - Novolog (Insulin Aspart) prn for BG excursions [facility] [place] [facility] SSI (150/25)  - BG checks q4hr  - Hypoglycemia protocol in place    ** Please notify Endocrine for any change and/or advance in diet**  ** Please call Endocrine for any BG related issues **    Discharge Planning:   TBD. Please notify endocrinology prior to discharge.            Plan discussed with patient, family, and RN at bedside.        Tod Parr, DNP, FNP  Endocrinology  [facility] - [facility] Intensive [facility]

## 2024-08-08 NOTE — PROGRESS NOTES
patient seen and examined on morning rounds with critical care team. Severe and persistent vasoplegia. on high doses of alpha agents. Does respond to volume, although oxygenation adequate requiring moderate ventilator support. We will continue with resuscitation in an effort to reduce pharmacologic requirements. Was hypoglycemic overnight but was also on insulin so difficult to know if this is due to hepatic failure. I spoke with his wife.

## 2024-08-08 NOTE — ASSESSMENT & PLAN NOTE
Endocrinology consulted for BG management.   BG goal 110-140 CTS    - D/C IIP  - Novolog (Insulin Aspart) prn for BG excursions [facility] SSI (150/25)  - BG checks q4hr  - Hypoglycemia protocol in place    ** Please notify Endocrine for any change and/or advance in diet**  ** Please call Endocrine for any BG related issues **    Discharge Planning:   TBD. Please notify endocrinology prior to discharge.

## 2024-08-08 NOTE — PT/OT/SLP PROGRESS
Physical Therapy      Patient Name:  Spike Estrella   MRN:  [id number]    Patient not seen today secondary to  (pt not seen due to poor medical prognosis.).     8/8/2024  '

## 2024-08-08 NOTE — PLAN OF CARE
Bedside real time ZAIRE images demonstrated mild to moderate LV systolic dysfunction and mild to moderate RV dysfunction without any s/o overload after transfusion of blood products and increase of nitric to 40ppm.

## 2024-08-08 NOTE — PROGRESS NOTES
[facility] - Surgical Intensive Care  Critical Care - Surgery  Progress Note    Patient Name: Spike Estrella  MRN: [id number]  Admission Date: 8/7/2024  Hospital Length of Stay: 1 days  Code Status: Full Code  Attending Provider: Waqar Bowman MD  Primary Care Provider: Constantin Schmid MD   Principal Problem: Nonrheumatic mitral valve regurgitation    Subjective:     Hospital/ICU Course:  No notes on file    Interval History/Significant Events: Arrived from OR on maximal vasopressor support from levo, vaso, epi, and giapreza. Initiated CRRT but was unable to tolerate, rinsed back after 20min. Increasing acidosis with 6x amps bicarb given; nephrology initiated Prismax for solute removal. Multiple units of blood product given with TEG-guided resuscitation. Persistenly incrceasing pressor requirements with stress steroids and O2 radical scavengers ordered. ZAIRE performed AM 8/8 with evidence of anterior clot vs effusion, no posterior effusion, and septal flattening indicating RV overload. High LVOT pressures indicative of volume down state. ChT output accelerating from 100/200/300 an hour.    Follow-up For: Procedure(s) (LRB):  REPLACEMENT, AORTIC VALVE, WITH REPEAT STERNOTOMY (N/A)  REPLACEMENT, MITRAL VALVE (N/A)  REPLACEMENT, AORTA, ASCENDING (N/A)    Post-Operative Day: 1 Day Post-Op    Objective:     Vital Signs (Most Recent):  Temp: 97.5 °F (36.4 °C) (08/08/24 0930)  Pulse: 90 (08/08/24 0930)  Resp: (!) 28 (08/08/24 0930)  BP: 126/83 (08/08/24 0800)  SpO2: 99 % (ABG) (08/08/24 0900) Vital Signs (24h Range):  Temp:  [96.3 °F (35.7 °C)-99.1 °F (37.3 °C)] 97.5 °F (36.4 °C)  Pulse:  [] 90  Resp:  [16-31] 28  SpO2:  [79 %-100 %] 99 %  BP: ()/(51-83) 126/83  Arterial Line BP: ()/(25-69) 94/58     Weight: 86.6 kg (191 lb)  Body mass index is 25.2 kg/m².      Intake/Output Summary (Last 24 hours) at 8/8/2024 1005  Last data filed at 8/8/2024 0950  Gross per 24 hour   Intake 31646.07 ml   Output  3041 ml   Net 28973.07 ml          Physical Exam  Constitutional:       General: He is not in acute distress.     Appearance: Normal appearance.   HENT:      Head: Normocephalic and atraumatic.   Cardiovascular:      Pulses: Normal pulses.      Comments: x2 meds ChT, x2 pleural ChT  R IJ CVC  R IJ Quad lumen  R radial arterial line  L femoral a line  Temporary V wires in place  Extremities goal  Pulmonary:      Effort: No respiratory distress.      Comments: Intubated  Abdominal:      General: Abdomen is flat. There is no distension.      Palpations: Abdomen is soft.   Genitourinary:     Comments: Michel in place  Musculoskeletal:         General: Normal range of motion.      Cervical back: Normal range of motion.   Skin:     General: Skin is warm and dry.   Neurological:      General: No focal deficit present.      Comments: Cough and gag reflexes absent  Pupillometry 0.6            Vents:  Vent Mode: A/C (08/08/24 0712)  Ventilator Initiated: Yes (08/07/24 1939)  Set Rate: 28 BPM (08/08/24 0712)  Vt Set: 500 mL (08/08/24 0712)  PEEP/CPAP: 12 cmH20 (08/08/24 0712)  Oxygen Concentration (%): 100 (08/08/24 0930)  Peak Airway Pressure: 39 cmH20 (08/08/24 0712)  Plateau Pressure: 31 cmH20 (08/08/24 0712)  Total Ve: 11.2 L/m (08/08/24 0712)  Negative Inspiratory Force (cm H2O): 0 (08/08/24 0712)  F/VT Ratio<105 (RSBI): (!) 58.21 (08/08/24 0712)    Lines/Drains/Airways       Central Venous Catheter Line  Duration             Percutaneous Central Line - Quad Lumen  08/07/24 0948 Internal Jugular Right 1 day    Trialysis (Dialysis) Catheter 08/07/24 1901 right internal jugular <1 day              Drain  Duration                  Urethral Catheter 08/07/24 0841 Non-latex;Straight-tip;Temperature probe 16 Fr. 1 day         Y Chest Tube 1 and 2 08/07/24 1 Right Pleural 19 Fr. Left Pleural 19 Fr. 1 day         Y Chest Tube 3 and 4 08/07/24 3 Right Mediastinal 19 Fr. 4 Left Mediastinal 19 Fr. 1 day         NG/OG Tube 08/08/24  0500 Center mouth <1 day              Airway  Duration                  Airway - Non-Surgical 08/07/24 1 day              Arterial Line  Duration             Arterial Line 08/07/24 0944 Right Radial 1 day    Arterial Line 08/08/24 Left Femoral <1 day              Line  Duration                  Pacer Wires 08/07/24 1017 <1 day              Peripheral Intravenous Line  Duration                  Peripheral IV - Single Lumen 08/07/24 0657 18 G Anterior;Distal;Right Forearm 1 day         Peripheral IV - Single Lumen 08/07/24 2139 20 G Anterior;Right Shoulder <1 day                    Significant Labs:    CBC/Anemia Profile:  Recent Labs   Lab 08/07/24 2220 08/07/24  2235 08/08/24  0211 08/08/24  0220 08/08/24  0515 08/08/24  0614 08/08/24  0710 08/08/24  0806 08/08/24  0908   WBC 17.11*  --  17.48*  --  15.92*  --   --   --   --    HGB 8.7*  --  9.0*  --  7.0*  --   --   --   --    HCT 29.4*   < > 29.3*   < > 24.2*   < > 15* 22* 21*   PLT 80*  --  58*  --  85*  --   --   --   --    *  --  94  --  99*  --   --   --   --    RDW 15.2*  --  17.9*  --  17.5*  --   --   --   --     < > = values in this interval not displayed.        Chemistries:  Recent Labs   Lab 08/07/24 1953 08/07/24 2220 08/08/24  0039 08/08/24  0515 08/08/24  0758   * 152* 153* 149*  --    K 3.7  3.6 3.2* 3.3* 4.0 4.3   * 110 112* 110  --    CO2 12* 16* 13* 10*  --    BUN 36* 36* 33* 30*  --    CREATININE 5.9* 6.4* 5.7* 4.9*  --    CALCIUM 11.5* 10.9* 10.1 9.4  --    ALBUMIN 1.9* 1.8* 1.9* 2.7*  --    PROT 3.8*  --  3.6* 4.3*  --    BILITOT 0.9  --  1.8* 2.9*  --    ALKPHOS 67  --  59 62  --    ALT 63*  --  450* 2,176*  --    *  --  505* 2,269*  --    MG 3.1* 3.1* 3.0* 3.1*  --    PHOS 5.3* 6.2* 6.7* 7.8*  --      Significant Imaging:  I have reviewed all pertinent imaging results/findings within the past 24 hours.  Assessment/Plan:     Cardiac/Vascular  * Nonrheumatic mitral valve regurgitation  Mr. Estrella is a 57 yo male  who is s/p mitral valve replacement, aortic valve replacement, ascending aorta replacement on 08/07/24 with Dr. Bowman.      Neuro/Psych:     - Sedation: off all sedation since 8/8 0300   - Brainstem reflex exam: no cough/gag/pupillary response 8/8   - Sugammadex given 8/8 AM    - Pain: oxy 5/10 PRN             Cardiac:   S/P mitral valve replacement, aortic valve replacement, ascending aorta replacement with Dr. Bowman on 8/7    - MAP Goal: 60-80    - Pressors: epi 1, vaso 0.08, levo 0.6, elham 40   - Concern for vasoplegic and hemorrhagic shock   - Initiated stress dose steroids hydrocortisone q8h   - ZAIRE performed at bedside 8/8   - Evidence of anterior clot vs effusion, no posterior effusion, and septal flattening indicating RV overload. High LVOT pressures indicative of volume down state    - EF 40-45% off pump postoperatively    - Rhythm: NSR   - Runs of Vtach resolved with CaCl administration      Pulmonary:     - Goal SpO2 >92%   - Monitor pO2 on ABGs closely in the setting of receiving hydroxocobalamin (pulse ox will be inaccurate)    - Continue current ventilator settings, A/C 28/500/ 100%/12+    - ABGs Q1H    - Chest Tubes x 4 (2 Meds & 2 Pleural)   - Monitor ChT output for concern for ongoing coagulopathic bleeding    - Kristian 40 ppm      Renal:    - Trend BUN/Cr     - Maintain Michel, record strict Is/Os    - Initiated CRRT postoperatively, did not tolerate SLED therefore transitioned to prismax  ` - Hx of ESRD on HD baseline   - Severe metabolic acidosis indication for dialysis   - RFPs q8h while on Prismax     - Nephrology consulted and appreciate their assistance.    Recent Labs   Lab 08/07/24  2220 08/08/24  0039 08/08/24  0515   BUN 36* 33* 30*   CREATININE 6.4* 5.7* 4.9*         FEN / GI:     - Daily CMP, PRN K/Mag/Phos per protocol     - Replace electrolytes as needed   - Aggressive CaCl replacement in the setting of massive transfusion   - Monitor carefully for runs of unstable Vtach    - Concern  for shock liver in the setting of severe vasoplegic shock   - AST/ALT 2000s 8/8   - Monitor CMPs BID   - Holding tylenol and statin    - Nutrition: NPO    - Bowel Regimen: currently held      ID:     - Afebrile    - WBC appropriate postoperatively    - Abx: Complete perioperative cefazolin 2g Q8H x 5 doses    Recent Labs   Lab 08/07/24  2220 08/08/24  0211 08/08/24  0515   WBC 17.11* 17.48* 15.92*         Heme/Onc:     - Hgb 12.8 pre-operatively    - CBC q8h in the setting of coagulopathic bleeding        - Current blood product transfusions since OR:   - pRBC 9, plt 3, FFP 6, cryo 40    - TEG-guided resuscitation    - ASA held in the setting of coagulopathic bleeding    - DVT ppx held in the setting of coagulopathic bleeding    Recent Labs   Lab 08/06/24  1048 08/07/24  1553 08/07/24  1953 08/07/24  2220 08/08/24  0211 08/08/24  0515   HGB 12.8* 7.3* 11.1* 8.7* 9.0* 7.0*    66* 102* 80* 58* 85*   APTT 31.9  --  51.1*  --   --  54.9*   INR 1.0 2.4* 1.3*  --   --  1.4*         Endocrine:     - CTS Goal -140    - HgbA1c: 5.3    - q1h glucose checks while on significant vasopressors and in setting of liver dysfunction    - Endocrinology consulted for BG management      PPx:   Feeding: NPO  Analgesia/Sedation: off sedation to monitor neurologic exam  Thromboembolic Prevention: held in the setting of severe coagulopathy  HOB >30: Yes  Stress Ulcer: famotidine IV  Glucose Control: insulin management per Endocrinology  Bowel reg: miralax, docusate  Invasive Lines/Drains/Airway:    ETT   Right radial arterial line    L femoral a line   RIJ trialysis   RIJ Quad lumen   Michel   Chest Tubes: 4 (2 Mediastinal, 2 Pleural)    Pacing Wires: Temporary ventricular pacing wires  Deescalation: n/a     Dispo/Code Status/Palliative:     - SICU Care    - Guarded prognosis, discussed with family.    - Full Code          Gabriella Funez MD  Critical Care - Surgery  [facility] - Surgical Intensive Care

## 2024-08-08 NOTE — PROCEDURES
"Spike Estrella is a 58 y.o. male patient.    Temp: 97.2 °F (36.2 °C) (08/08/24 0707)  Pulse: 90 (08/08/24 0712)  Resp: (!) 28 (08/08/24 0712)  BP: (!) 120/55 (08/08/24 0707)  SpO2: (!) 86 % (08/08/24 0712)  Weight: 86.6 kg (191 lb) (08/07/24 0649)  Height: 6' 1" (185.4 cm) (08/07/24 0649)       Arterial Line    Date/Time: 8/8/2024 7:22 AM  Location procedure was performed: [facility] CRITICAL CARE SURGERY    Performed by: Steve Mujica MD  Authorized by: Steve Mujica MD  Assisting provider: Evaristo Wyman MD  Consent Done: Emergent Situation  Preparation: Patient was prepped and draped in the usual sterile fashion.  Indications: multiple ABGs and hemodynamic monitoring  Location: left femoral  Needle gauge: 5 [place].  Number of attempts: 2  Complications: No  Estimated blood loss (mL): 3  Specimens: No  Implants: No  Post-procedure: line sutured and dressing applied  Patient tolerance: Patient tolerated the procedure well with no immediate complications          8/8/2024    "

## 2024-08-08 NOTE — PROGRESS NOTES
08/08/24 0920   Treatment   Treatment Type Other   Treatment Status Order change   CRRT Comments Dialysate changed to 5000

## 2024-08-08 NOTE — ASSESSMENT & PLAN NOTE
Mr. Estrella is a 59 yo male who is s/p mitral valve replacement, aortic valve replacement, ascending aorta replacement on 08/07/24 with Dr. Bowman.      Neuro/Psych:     - Sedation: off all sedation since 8/8 0300   - Brainstem reflex exam: no cough/gag/pupillary response 8/8   - Sugammadex given 8/8 AM    - Pain: oxy 5/10 PRN             Cardiac:   S/P mitral valve replacement, aortic valve replacement, ascending aorta replacement with Dr. Bowman on 8/7    - MAP Goal: 60-80    - Pressors: epi 1, vaso 0.08, levo 0.6, elham 40   - Concern for vasoplegic and hemorrhagic shock   - Initiated stress dose steroids hydrocortisone q8h   - ZAIRE performed at bedside 8/8   - Evidence of anterior clot vs effusion, no posterior effusion, and septal flattening indicating RV overload. High LVOT pressures indicative of volume down state    - EF 40-45% off pump postoperatively    - Rhythm: NSR   - Runs of Vtach resolved with CaCl administration      Pulmonary:     - Goal SpO2 >92%   - Monitor pO2 on ABGs closely in the setting of receiving hydroxocobalamin (pulse ox will be inaccurate)    - Continue current ventilator settings, A/C 28/500/ 100%/12+    - ABGs Q1H    - Chest Tubes x 4 (2 Meds & 2 Pleural)   - Monitor ChT output for concern for ongoing coagulopathic bleeding    - Kristian 40 ppm      Renal:    - Trend BUN/Cr     - Maintain Michel, record strict Is/Os    - Initiated CRRT postoperatively, did not tolerate SLED therefore transitioned to prismax  ` - Hx of ESRD on HD baseline   - Severe metabolic acidosis indication for dialysis   - RFPs q8h while on Prismax     - Nephrology consulted and appreciate their assistance.    Recent Labs   Lab 08/07/24  2220 08/08/24  0039 08/08/24  0515   BUN 36* 33* 30*   CREATININE 6.4* 5.7* 4.9*         FEN / GI:     - Daily CMP, PRN K/Mag/Phos per protocol     - Replace electrolytes as needed   - Aggressive CaCl replacement in the setting of massive transfusion   - Monitor carefully for runs of  unstable Vtach    - Concern for shock liver in the setting of severe vasoplegic shock   - AST/ALT 2000s 8/8   - Monitor CMPs BID   - Holding tylenol and statin    - Nutrition: NPO    - Bowel Regimen: currently held      ID:     - Afebrile    - WBC appropriate postoperatively    - Abx: Complete perioperative cefazolin 2g Q8H x 5 doses    Recent Labs   Lab 08/07/24  2220 08/08/24  0211 08/08/24  0515   WBC 17.11* 17.48* 15.92*         Heme/Onc:     - Hgb 12.8 pre-operatively    - CBC q8h in the setting of coagulopathic bleeding        - Current blood product transfusions since OR:   - pRBC 9, plt 3, FFP 6, cryo 40    - TEG-guided resuscitation    - ASA held in the setting of coagulopathic bleeding    - DVT ppx held in the setting of coagulopathic bleeding    Recent Labs   Lab 08/06/24  1048 08/07/24  1553 08/07/24  1953 08/07/24  2220 08/08/24  0211 08/08/24  0515   HGB 12.8* 7.3* 11.1* 8.7* 9.0* 7.0*    66* 102* 80* 58* 85*   APTT 31.9  --  51.1*  --   --  54.9*   INR 1.0 2.4* 1.3*  --   --  1.4*         Endocrine:     - CTS Goal -140    - HgbA1c: 5.3    - q1h glucose checks while on significant vasopressors and in setting of liver dysfunction    - Endocrinology consulted for BG management      PPx:   Feeding: NPO  Analgesia/Sedation: off sedation to monitor neurologic exam  Thromboembolic Prevention: held in the setting of severe coagulopathy  HOB >30: Yes  Stress Ulcer: famotidine IV  Glucose Control: insulin management per Endocrinology  Bowel reg: miralax, docusate  Invasive Lines/Drains/Airway:    ETT   Right radial arterial line    L femoral a line   RIJ trialysis   RIJ Quad lumen   Michel   Chest Tubes: 4 (2 Mediastinal, 2 Pleural)    Pacing Wires: Temporary ventricular pacing wires  Deescalation: n/a     Dispo/Code Status/Palliative:     - SICU Care    - Guarded prognosis, discussed with family.    - Full Code

## 2024-08-08 NOTE — PLAN OF CARE
Notified by ICU staff that patient did not tolerate SLED, BP dropped significantly after about 30 minutes while on 4 pressors. D/w overnight attending. Currently still maxed out on epi, levo, vaso, elham. Hypernatremia possibly secondary to bicarb pushes (6 so far). We will attempt to initiate prismax CRRT for metabolic acidosis. Aim for net UF 0, euvolemia.

## 2024-08-08 NOTE — H&P
[facility]  Critical Care - Surgery  History & Physical    Patient Name: Spike Estrella  MRN: [id number]  Admission Date: 8/7/2024  Code Status: Full Code  Attending Physician: Waqar Bowman MD   Primary Care Provider: Constantin Schmid MD   Principal Problem: Nonrheumatic mitral valve regurgitation    Subjective:     HPI:  Spike Estrella is a 58 y.o. M with pmh of AS s/p mini AVR by Dr. Bowman in 2014, CAD, ESRD on HD (being worked up for renal transplant), HTN, Lupus, and severe MR    The patient presents to the SICU s/p mitral valve replacement, aortic valve replacement, and ascending aorta replacement with Dr. Bowman on 08/07/24.   On admission, they are intubated, sedated with propofol, and in stable condition. Inotropic and vasopressor requirements upon admission are 0.1 mcg/kg/min of epinephrine, 0.2 mcg/kg/min of norepinephrine, 0.08 units/min of vasopressin, and giapreza 80 ng/kg/min to maintain blood pressure at a MAP 60. Central access includes a RI CVC x2, arterial access includes a left radial arterial line.      Intraoperatively, they received 3L of crystalloid, 1800cc of cell saver, 2 PRBCs, 8 FFP, 2 platelets, and 2 cryoprecipitate. Urine output intraoperatively was 225cc.      Immediate post-operative plans include hemodynamic stabilization and weaning of cardiac and respiratory support. Plan to wean vasopressors and inotropes as tolerated, wean ventilator support, and closely monitor fluid status with strict Is/Os and continued fluid resuscitation as needed.       Hospital/ICU Course:  No notes on file    Follow-up For: Procedure(s) (LRB):  REPLACEMENT, AORTIC VALVE, WITH REPEAT STERNOTOMY (N/A)  REPLACEMENT, MITRAL VALVE (N/A)  REPLACEMENT, AORTA, ASCENDING (N/A)    Post-Operative Day: Day of Surgery     Past Medical History:   Diagnosis Date    Anemia of renal disease     Essential hypertension     Hx of aortic valve replacement     Immunosuppressed status      Lupus nephritis     Metabolic acidosis     Personal history of colonic polyps     Secondary hyperparathyroidism of renal origin     Stage 4 chronic kidney disease     Stenosis and insufficiency of lacrimal passages     Systemic lupus erythematosus        Past Surgical History:   Procedure Laterality Date    AORTIC VALVE REPLACEMENT  06/16/2014    porcine valve replacement    CARDIAC VALVE REPLACEMENT  06/2014    TONSILLECTOMY         Review of patient's allergies indicates:   Allergen Reactions    Amlodipine Swelling    Adhesive     Iodine     Codeine Anxiety       Family History       Problem Relation (Age of Onset)    Cancer Father    Coronary artery disease Maternal Uncle    Hypertension Mother    Lymphoma Father    No Known Problems Brother, Brother, Brother, Brother    Valvular heart disease Mother          Tobacco Use    Smoking status: Never     Passive exposure: Never    Smokeless tobacco: Former     Types: Snuff, Chew     Quit date: 1997   Substance and Sexual Activity    Alcohol use: Yes     Alcohol/week: 1.0 standard drink of alcohol     Types: 1 Cans of beer per week     Comment: occasionally     Drug use: No    Sexual activity: Yes     Partners: Female     Birth control/protection: None      Review of Systems   Unable to perform ROS: Intubated     Objective:     Vital Signs (Most Recent):  Temp: 98.2 °F (36.8 °C) (08/07/24 2000)  Pulse: 80 (08/07/24 2029)  Resp: (!) 22 (08/07/24 2029)  BP: (!) 162/78 (08/07/24 0649)  SpO2: (!) 91 % (08/07/24 2029) Vital Signs (24h Range):  Temp:  [98.2 °F (36.8 °C)-98.6 °F (37 °C)] 98.2 °F (36.8 °C)  Pulse:  [78-80] 80  Resp:  [16-22] 22  SpO2:  [79 %-100 %] 91 %  BP: (162)/(78) 162/78     Weight: 86.6 kg (191 lb)  Body mass index is 25.2 kg/m².      Intake/Output Summary (Last 24 hours) at 8/7/2024 2125  Last data filed at 8/7/2024 2100  Gross per 24 hour   Intake 6173 ml   Output 225 ml   Net 5948 ml          Physical Exam  Constitutional:       General: He is not in  acute distress.     Appearance: Normal appearance.   HENT:      Head: Normocephalic and atraumatic.   Cardiovascular:      Pulses: Normal pulses.      Comments: CT x4--2 meds, 2 pleural  R IJ CVC  R IJ Quad lumen  R radial arterial line  Temporary V wires in place  Pulmonary:      Comments: Intubated  Abdominal:      General: Abdomen is flat. There is no distension.      Palpations: Abdomen is soft.   Genitourinary:     Comments: Michel in place  Musculoskeletal:         General: Normal range of motion.      Cervical back: Normal range of motion.   Skin:     General: Skin is warm and dry.   Neurological:      General: No focal deficit present.            Vents:  Vent Mode: A/C (08/07/24 2000)  Ventilator Initiated: Yes (08/07/24 1939)  Set Rate: 22 BPM (08/07/24 2000)  Vt Set: 600 mL (08/07/24 2000)  PEEP/CPAP: 10 cmH20 (08/07/24 2000)  Oxygen Concentration (%): 100 (08/07/24 2000)  Peak Airway Pressure: 33 cmH20 (08/07/24 2000)  Plateau Pressure: 24 cmH20 (08/07/24 2000)  Total Ve: 13.8 L/m (08/07/24 2000)  Negative Inspiratory Force (cm H2O): 0 (08/07/24 2000)  F/VT Ratio<105 (RSBI): (!) 35.09 (08/07/24 2000)    Lines/Drains/Airways       Central Venous Catheter Line  Duration                  Hemodialysis Catheter 08/07/24 0946 <1 day         Percutaneous Central Line Insertion/Assessment - Quad lumen  08/07/24 0948 Internal Jugular Right <1 day    Percutaneous Central Line - Quad Lumen  08/07/24 0948 Internal Jugular Right <1 day              Drain  Duration                  Urethral Catheter 08/07/24 0841 Non-latex;Straight-tip;Temperature probe 16 Fr. <1 day         Y Chest Tube 1 and 2 08/07/24 1 Right Pleural 19 Fr. Left Pleural 19 Fr. <1 day         Y Chest Tube 3 and 4 08/07/24 3 Right Mediastinal 19 Fr. 4 Left Mediastinal 19 Fr. <1 day              Arterial Line  Duration             Arterial Line 08/07/24 0944 Right Radial <1 day              Line  Duration                  Pacer Wires 08/07/24 1017 <1  day              Peripheral Intravenous Line  Duration                  Peripheral IV - Single Lumen 09/17/19 0812 22 G Right Forearm 1786 days         Peripheral IV - Single Lumen 08/07/24 0657 18 G Anterior;Distal;Right Forearm <1 day                    Significant Labs:    CBC/Anemia Profile:  Recent Labs   Lab 08/06/24  1048 08/07/24  0856 08/07/24  1553 08/07/24  1614 08/07/24 1951 08/07/24 1953 08/07/24  2048   WBC 9.76  --  21.39*  --   --  17.65*  --    HGB 12.8*  --  7.3*  --   --  11.1*  --    HCT 41.6   < > 22.7*   < > 32* 35.9* 31*     --  66*  --   --  102*  --    MCV 96  --  93  --   --  97  --    RDW 14.0  --  14.4  --   --  15.0*  --     < > = values in this interval not displayed.        Chemistries:  Recent Labs   Lab 08/06/24  1048 08/07/24 1953    149*   K 3.9 3.7  3.6    113*   CO2 26 12*   BUN 44* 36*   CREATININE 6.3* 5.9*   CALCIUM 9.8 11.5*   ALBUMIN 3.9 1.9*   PROT 7.2 3.8*   BILITOT 0.7 0.9   ALKPHOS 170* 67   ALT 19 63*   AST 31 140*   MG  --  3.1*   PHOS  --  5.3*           Significant Imaging: I have reviewed all pertinent imaging results/findings within the past 24 hours.  Assessment/Plan:     Cardiac/Vascular  * Nonrheumatic mitral valve regurgitation  Mr. Estrella is a 57 yo male who is s/p mitral valve replacement, aortic valve replacement, ascending aorta replacement on 08/07/24 with Dr. Bowman.      Neuro/Psych:     - Sedation: propofol    - Pain:    - Scheduled Tylenol 1g q8h   - Fentanyl PRN while intubated, Oxy PRN             Cardiac:     - S/P mitral valve replacement, aortic valve replacement, ascending aorta replacement with Dr. Bowman on 8/7    - MAP Goal: 60-80    - Pressors: epi 0.1, vaso 0.08, levo 0.2, elham 80    - Anti-HTNs: Will start when appropriate    - Rhythm: NSR    - Beta blocker: Will start when appropriate    - Statin: Atorvastatin 40 mg QD      Pulmonary:     - Goal SpO2 >92%    - Will wean ventilator support as tolerated, currently on  FiO2 100%, PEEP 10    - Chest Tubes x 4 (2 Meds & 2 Pleural)    - ABGs Q1H    - Kristian 20 ppm      Renal:    - Trend BUN/Cr     - Maintain Michel, record strict Is/Os    - ESRD on HD, being evaluated for renal transplant    - nephrology consulted, will start SLED for metabolic acidosis    Recent Labs   Lab 08/06/24  1048 08/07/24 1953   BUN 44* 36*   CREATININE 6.3* 5.9*         FEN / GI:     - Daily CMP, PRN K/Mag/Phos per protocol     - Replace electrolytes as needed    - Nutrition: NPO    - Bowel Regimen: Miralax, docusate      ID:     - Afebrile    - WBC stable    - Abx: Complete perioperative cefazolin 2g Q8H x 5 doses    Recent Labs   Lab 08/06/24  1048 08/07/24  1553 08/07/24 1953   WBC 9.76 21.39* 17.65*         Heme/Onc:     - Hgb 12.8 pre-operatively    - CBC daily    - will hold ASA tonight POD 0    Recent Labs   Lab 08/06/24  1048 08/07/24  1553 08/07/24 1953   HGB 12.8* 7.3* 11.1*    66* 102*   APTT 31.9  --  51.1*   INR 1.0 2.4* 1.3*         Endocrine:     - CTS Goal -140    - HgbA1c: 5.3    - Endocrinology consulted for insulin management      PPx:   Feeding: NPO  Analgesia/Sedation:  Thromboembolic Prevention: n/a  HOB >30: Yes  Stress Ulcer: famotidine  Glucose Control: Yes, insulin management per Endocrinology  Bowel reg: miralax, docusate  Invasive Lines/Drains/Airway:    ETT   Right radial arterial line   RIJ trialysis   RIJ Quad lumen   Michel   Chest Tubes: 4 (2 Mediastinal, 2 Pleural)    Pacing Wires: Temporary ventricular pacing wires  Deescalation: dc'ed     Dispo/Code Status/Palliative:     - Continue SICU Care    - Full Code               Critical care was time spent personally by me on the following activities: development of treatment plan with patient or surrogate and bedside caregivers, discussions with consultants, evaluation of patient's response to treatment, examination of patient, ordering and performing treatments and interventions, ordering and review of laboratory  studies, ordering and review of radiographic studies, pulse oximetry, re-evaluation of patient's condition.  This critical care time did not overlap with that of any other provider or involve time for any procedures.     Lan Hernandez MD  Critical Care - Surgery  [facility]

## 2024-08-08 NOTE — NURSING
Pt arrived to SICU room #[id number] via bed with portable vents and telemonitoring. Infusions running included: Propofol, Epi, Levo, Vaso, Leanna. Pt was transferred to wall monitor and continuous monitoring started. CTS at bedside, new orders received, labs collected.    Nurses Note -- 4 Eyes      8/8/2024   7:34 AM      Skin assessed during: Admit      [] No Altered Skin Integrity Present    []Prevention Measures Documented      [x] Yes- Altered Skin Integrity Present or Discovered   [x] LDA Added if Not in Epic (Describe Wound)   [] New Altered Skin Integrity was Present on Admit and Documented in LDA   [] Wound Image Taken    Wound Care Consulted? Yes    Attending Nurse:  SARI LEROY     Second RN/Staff Member:  Deana LEROY

## 2024-08-08 NOTE — ASSESSMENT & PLAN NOTE
ESRD on home dialysis 4 times per week  Cardiogenic shock - currently on 4 pressors  S/p aortic and mitral valve replacement and ascending aorta repair  Acute hypoxic resp failure/ARDs d/t pulmonary edema  Severe lactic acidosis - combined metabolic and resp acidosis  Hypernatremia  Hypercalcemia  Elevated liver enzymes - likely acute liver injury  Leukocytosis  Blood loss anemia    Recommendations/Plan  Consent for RRT obtained from family, documented in chart.   Will continue CVVHDF at adjusted dose for better clearance. Dose currently 80 mL/kg/hr. Net UF 0.  Will reassess daily RRT needs and make adjustments as needed per protocol.   Continue bicarbonate infusion at 150 mL/hr for now to help acidosis.   Avoid nephrotoxic medications  Strict I/Os

## 2024-08-08 NOTE — SUBJECTIVE & OBJECTIVE
Interval History:   Patient remain critically ill on triple pressor therapy this AM (EPI, Levo, Vaso, weaned off Angiotensin II). Remain intubated at 100% FiO2. Positive 11 L/24 hrs.   Labs still with significant electrolyte derangements despite RRT. Dose adjustments made to RRT.   pH 7.091. HCO3 13.1. PCO2 43.1.    Review of patient's allergies indicates:   Allergen Reactions    Amlodipine Swelling    Adhesive     Iodine     Codeine Anxiety     Current Facility-Administered Medications   Medication Frequency    0.9%  NaCl infusion (CRRT USE ONLY) Continuous    0.9%  NaCl infusion (for blood administration) Q24H PRN    0.9%  NaCl infusion (for blood administration) Q24H PRN    0.9%  NaCl infusion (for blood administration) Q24H PRN    0.9%  NaCl infusion (for blood administration) Q24H PRN    0.9%  NaCl infusion (for blood administration) Q24H PRN    0.9%  NaCl infusion (for blood administration) Q24H PRN    0.9%  NaCl infusion Continuous    0.9%  NaCl infusion PRN    albumin human 5% bottle 25 g Once PRN    albuterol-ipratropium 2.5 mg-0.5 mg/3 mL nebulizer solution 3 mL Q4H    albuterol-ipratropium 2.5 mg-0.5 mg/3 mL nebulizer solution 3 mL Q4H PRN    angiotensin II (GIAPREZA) 2,500,000 ng in 0.9% NaCl 250 mL infusion Continuous    bisacodyL suppository 10 mg Daily PRN    calcium chloride 1 g in D5W 100 mL IVPB Once    calcium chloride 2 g in D5W 100 mL IVPB Once    calcium chloride 2 g in D5W 100 mL IVPB Once    calcium chloride 2 g in D5W 100 mL IVPB Once    ceFAZolin 2 g in D5W 50 mL IVPB (MB+) Q12H    dextrose 10% bolus 125 mL 125 mL PRN    dextrose 10% bolus 250 mL 250 mL PRN    dextrose 5 % and 0.45 % NaCl with KCl 20 mEq infusion Continuous    docusate sodium capsule 100 mg BID    EPINEPHrine 30 mg in 250 mL D5W (READY TO MIX) (PHARMACIST USE ONLY) infusion Continuous    famotidine (PF) injection 20 mg Daily    fentaNYL 50 mcg/mL injection 25 mcg Q1H PRN    fentaNYL 50 mcg/mL injection 25 mcg Q15 Min PRN     Followed by    [START ON 8/10/2024] fentaNYL 50 mcg/mL injection 50 mcg Q1H PRN    glucagon (human recombinant) injection 1 mg PRN    hydrocortisone sodium succinate injection 100 mg Q8H    insulin aspart U-100 pen 0-10 Units Q4H PRN    magnesium sulfate 2g in water 50mL IVPB (premix) PRN    metoclopramide injection 5 mg Q6H PRN    mupirocin 2 % ointment 1 g BID    nitric oxide gas Gas 40 ppm Continuous    NORepinephrine 32 mg in D5W 250 mL infusion Continuous    ondansetron injection 4 mg Q12H PRN    oxyCODONE immediate release tablet 5 mg Q4H PRN    oxyCODONE immediate release tablet Tab 10 mg Q4H PRN    polyethylene glycol packet 17 g Daily    propofol (DIPRIVAN) 10 mg/mL infusion Continuous    Prothrombin complex concentrate, human (Kcentra) 2,028 Units in sterile water for injection IVPB Once    sodium bicarbonate 150 mEq in D5W 1,000 mL infusion Continuous    sodium bicarbonate solution 50 mEq Once    sodium chloride 0.9% flush 10 mL PRN    sodium phosphate 20.01 mmol in D5W 250 mL IVPB PRN    sodium phosphate 30 mmol in D5W 250 mL IVPB PRN    sodium phosphate 39.99 mmol in D5W 250 mL IVPB PRN    vasopressin (PITRESSIN) 0.2 Units/mL in D5W 100 mL infusion Continuous       Objective:     Vital Signs (Most Recent):  Temp: 97.5 °F (36.4 °C) (08/08/24 1238)  Pulse: 79 (08/08/24 1238)  Resp: (!) 28 (08/08/24 1238)  BP: 126/83 (08/08/24 0800)  SpO2: 99 % (ABG) (08/08/24 1127) Vital Signs (24h Range):  Temp:  [96.3 °F (35.7 °C)-99.1 °F (37.3 °C)] 97.5 °F (36.4 °C)  Pulse:  [] 79  Resp:  [16-31] 28  SpO2:  [22 %-100 %] 99 %  BP: ()/(51-83) 126/83  Arterial Line BP: ()/(25-80) 94/54     Weight: 86.6 kg (191 lb) (08/07/24 0649)  Body mass index is 25.2 kg/m².  Body surface area is 2.11 meters squared.    I/O last 3 completed shifts:  In: 69337.6 [I.V.:6885.5; Blood:6241; IV Piggyback:125.1]  Out: 1413 [Urine:235; Other:78; Chest Tube:1100]     Physical Exam  Vitals and nursing note reviewed.    Constitutional:       Appearance: Normal appearance. He is ill-appearing.   HENT:      Head: Normocephalic and atraumatic.   Cardiovascular:      Rate and Rhythm: Normal rate and regular rhythm.      Pulses: Normal pulses.      Heart sounds: Normal heart sounds.   Pulmonary:      Breath sounds: Rales present.      Comments: Mechanical lung sounds. Significant diffuse rales bilaterally. Post surgical dressings from sternotomy, multiple drains w bloody fluid attached to pleur evac.   Abdominal:      General: Abdomen is flat. There is no distension.      Palpations: Abdomen is soft.   Genitourinary:     Comments: Michel in place  Musculoskeletal:      Right lower leg: No edema.      Left lower leg: No edema.   Skin:     General: Skin is warm and dry.      Comments: Shunt in place on L arm. Trialysis secured in place on R neck   Neurological:      Mental Status: He is alert.      Comments: Intubated and sedated   Psychiatric:      Comments: Intubated and sedated          Significant Labs:  CBC:   Recent Labs   Lab 08/08/24  1113 08/08/24  1212   WBC 7.86  --    RBC 2.54*  --    HGB 7.1*  --    HCT 23.9* 19*   *  --    MCV 94  --    MCH 28.0  --    MCHC 29.7*  --      CMP:   Recent Labs   Lab 08/08/24  1113      CALCIUM 9.7   ALBUMIN 2.7*   PROT 4.7*      K 5.3*   CO2 10*      BUN 16   CREATININE 2.8*   ALKPHOS 79   ALT 4,114*   AST 5,012*   BILITOT 2.2*     All labs within the past 24 hours have been reviewed.

## 2024-08-08 NOTE — SUBJECTIVE & OBJECTIVE
Past Medical History:   Diagnosis Date    Anemia of renal disease     Essential hypertension     Hx of aortic valve replacement     Immunosuppressed status     Lupus nephritis     Metabolic acidosis     Personal history of colonic polyps     Secondary hyperparathyroidism of renal origin     Stage 4 chronic kidney disease     Stenosis and insufficiency of lacrimal passages     Systemic lupus erythematosus        Past Surgical History:   Procedure Laterality Date    AORTIC VALVE REPLACEMENT  06/16/2014    porcine valve replacement    CARDIAC VALVE REPLACEMENT  06/2014    TONSILLECTOMY         Review of patient's allergies indicates:   Allergen Reactions    Amlodipine Swelling    Adhesive     Iodine     Codeine Anxiety     Current Facility-Administered Medications   Medication Frequency    0.9%  NaCl infusion (CRRT USE ONLY) Continuous    0.9%  NaCl infusion (for blood administration) Q24H PRN    0.9%  NaCl infusion Continuous    albumin human 5% bottle 25 g Once PRN    albuterol-ipratropium 2.5 mg-0.5 mg/3 mL nebulizer solution 3 mL Q4H    albuterol-ipratropium 2.5 mg-0.5 mg/3 mL nebulizer solution 3 mL Q4H PRN    angiotensin II (GIAPREZA) 2,500,000 ng in 0.9% NaCl 250 mL infusion Continuous    [START ON 8/8/2024] aspirin tablet 325 mg Daily    bisacodyL suppository 10 mg Daily PRN    [START ON 8/8/2024] ceFAZolin 2 g in D5W 50 mL IVPB (MB+) Q12H    dextrose 10% bolus 125 mL 125 mL PRN    dextrose 10% bolus 250 mL 250 mL PRN    dextrose 5 % and 0.45 % NaCl with KCl 20 mEq infusion Continuous    docusate sodium capsule 100 mg BID    EPINEPHrine 5 mg in dextrose 5% 250 mL infusion (premix) Continuous    famotidine (PF) injection 20 mg Daily    fentaNYL 50 mcg/mL injection 25 mcg Q1H PRN    fentaNYL 50 mcg/mL injection 25 mcg Q15 Min PRN    Followed by    [START ON 8/10/2024] fentaNYL 50 mcg/mL injection 50 mcg Q1H PRN    insulin regular in 0.9 % NaCl 100 unit/100 mL (1 unit/mL) infusion Continuous    magnesium sulfate  2g in water 50mL IVPB (premix) PRN    magnesium sulfate 2g in water 50mL IVPB (premix) PRN    magnesium sulfate 2g in water 50mL IVPB (premix) PRN    metoclopramide injection 5 mg Q6H PRN    mupirocin 2 % ointment 1 g BID    NORepinephrine 4 mg in dextrose 5% 250 mL infusion (premix) Continuous    ondansetron injection 4 mg Q12H PRN    oxyCODONE immediate release tablet 5 mg Q4H PRN    oxyCODONE immediate release tablet Tab 10 mg Q4H PRN    [START ON 8/8/2024] polyethylene glycol packet 17 g Daily    potassium chloride 20 mEq in 100 mL IVPB (FOR CENTRAL LINE ADMINISTRATION ONLY) PRN    And    potassium chloride 40 mEq in 100 mL IVPB (FOR CENTRAL LINE ADMINISTRATION ONLY) PRN    And    potassium chloride 20 mEq in 100 mL IVPB (FOR CENTRAL LINE ADMINISTRATION ONLY) PRN    propofol (DIPRIVAN) 10 mg/mL infusion Continuous    sodium chloride 0.9% flush 10 mL PRN    sodium phosphate 15 mmol in D5W 250 mL IVPB PRN    sodium phosphate 20.01 mmol in D5W 250 mL IVPB PRN    sodium phosphate 20.01 mmol in D5W 250 mL IVPB PRN    sodium phosphate 30 mmol in D5W 250 mL IVPB PRN    sodium phosphate 30 mmol in D5W 250 mL IVPB PRN    sodium phosphate 39.99 mmol in D5W 250 mL IVPB PRN    vasopressin (PITRESSIN) 0.2 Units/mL in D5W 100 mL infusion Continuous     Family History       Problem Relation (Age of Onset)    Cancer Father    Coronary artery disease Maternal Uncle    Hypertension Mother    Lymphoma Father    No Known Problems Brother, Brother, Brother, Brother    Valvular heart disease Mother          Tobacco Use    Smoking status: Never     Passive exposure: Never    Smokeless tobacco: Former     Types: Snuff, Chew     Quit date: 1997   Substance and Sexual Activity    Alcohol use: Yes     Alcohol/week: 1.0 standard drink of alcohol     Types: 1 Cans of beer per week     Comment: occasionally     Drug use: No    Sexual activity: Yes     Partners: Female     Birth control/protection: None     Review of Systems   Reason unable  to perform ROS: intubated and sedated.     Objective:     Vital Signs (Most Recent):  Temp: 98.2 °F (36.8 °C) (08/07/24 2000)  Pulse: 80 (08/07/24 2029)  Resp: (!) 22 (08/07/24 2029)  BP: (!) 162/78 (08/07/24 0649)  SpO2: (!) 91 % (08/07/24 2029) Vital Signs (24h Range):  Temp:  [98.2 °F (36.8 °C)-98.6 °F (37 °C)] 98.2 °F (36.8 °C)  Pulse:  [78-80] 80  Resp:  [16-22] 22  SpO2:  [79 %-100 %] 91 %  BP: (162)/(78) 162/78     Weight: 86.6 kg (191 lb) (08/07/24 0649)  Body mass index is 25.2 kg/m².  Body surface area is 2.11 meters squared.    I/O last 3 completed shifts:  In: 4950 [I.V.:2000; Blood:2950]  Out: -      Physical Exam  Constitutional:       Appearance: Normal appearance. He is ill-appearing.   HENT:      Head: Normocephalic and atraumatic.   Cardiovascular:      Rate and Rhythm: Normal rate and regular rhythm.      Pulses: Normal pulses.      Heart sounds: Normal heart sounds.   Pulmonary:      Breath sounds: Rales present.      Comments: Mechanical lung sounds. Significant diffuse rales bilaterally. Post surgical dressings from sternotomy, multiple drains w bloody fluid attached to pleur evac.   Abdominal:      General: Abdomen is flat. There is no distension.      Palpations: Abdomen is soft.   Genitourinary:     Comments: Michel in place  Musculoskeletal:      Right lower leg: No edema.      Left lower leg: No edema.   Skin:     General: Skin is warm and dry.      Comments: Shunt in place on L arm. Trialysis secured in place on R neck   Neurological:      Mental Status: He is alert.      Comments: Intubated and sedated   Psychiatric:      Comments: Intubated and sedated          Significant Labs:  All labs within the past 24 hours have been reviewed.    Significant Imaging:  X-Ray: Reviewed

## 2024-08-08 NOTE — OP NOTE
DATE OF PROCEDURE:  8/7/2024     ATTENDING SURGEON:  aWqar Bowman M.D.    ASSISTANT:  Miles Nagy DO (Cardiothoracic Resident)     PREOPERATIVE DIAGNOSES:  1.  Mixed mitral stenosis and regurgitation.  2.  Prosthetic valve dysfunction of aortic prosthesis  3.  End stage renal disease  4.  Previous aortic valve replacement in 2014  5.  Hypertension  6.  Lupus     POSTOPERATIVE DIAGNOSES:  1.  Mixed mitral stenosis and regurgitation.  2.  Prosthetic valve dysfunction of aortic prosthesis  3.  End stage renal disease  4.  Previous aortic valve replacement in 2014  5.  Hypertension  6.  Lupus     OPERATION PERFORMED:      1)  Re-replacement of aortic valve with a 21 mm Medtronic Avalus bovine pericardial bioprosthesis    2)  Mitral valve replacement with a 29 mm Medtronic Mosaic porcine bioprosthesis    3)  Ascending aortic replacement with a 32 mm Gelweave Dacron tube graft    4)  Redo sternotomy     ANESTHESIA:  General endotracheal.     ESTIMATED BLOOD LOSS:  100 mL.     BRIEF HISTORY: Mr. Estrella is a 58-year-old gentleman who underwent aortic valve replacement in 2014.  In accordance with his wishes, a tissue valve was utilized at that time.  Over the intervening years the valve has degenerated.  It is both stenotic and regurgitant.  He has also developed mitral valve disease, with mixed mitral stenosis and regurgitation.  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  He is symptomatic for this, with dyspnea on exertion and progressive fatigue.  He now presents for surgical correction.        PROCEDURE IN DETAIL:  After obtaining informed and written consent, the patient   was brought to the Operating Room and placed on the operating table in supine   position.  After induction of adequate general endotracheal anesthesia, the   patient's chest, abdomen, pelvis and bilateral lower extremities were prepped   and draped in the usual sterile fashion. A wire was placed in the right common femoral artery using Seldinger  technique. Its position in the descending aorta was confirmed using ZAIRE. We then turned our attention to the chest, where the patient's previous upper midline   skin incision was reopened.  Dissection was carried down to the level of the   sternum.  The wires were divided, but not removed.  The oscillating saw was then   used to divide the anterior table of the sternum.  The wires were removed, and   the posterior table of the sternum was divided using the straight Mckeon scissors.    Once the sternum had been safely traversed, the mammary retractor was placed   and the retrosternal adhesions were taken down first on the left and then on the   right using the electrocautery device.  The sternal retractor was then placed,   and we began the intrapericardial portion of the dissection.  The   adhesions were moderate.  We began at the diaphragmatic surface of the   heart, and carefully worked our way down to the inferior vena cava.  We then   proceeded along the right atrial free wall.  The right atrial free wall was   fairly densely adherent to the pericardium.  Eventually, we were able to work our way to  the superior vena cava.  We   were able to expose the anterior surface of the superior vena cava.  We then   turned our attention to the aorta.  We were able to dissect distally   until we reached the origin of the innominate artery.  At this time, we had   adequate exposure for cannulation.  The patient was systemically heparinized. Cannulation sutures were placed in the aorta and in the superior vena cava.  The aortic cannula was inserted,   followed by the venous.  An IVC cannula was also placed.  An antegrade cardioplegia catheter was placed.  The patient was then put on   cardiopulmonary bypass.  Circumferential control was obtained of the superior vena cava and the inferior vena cava.  The aorta was  from the pulmonary artery.  A right atriotomy was made.  Under direct vision, the coronary sinus catheter  was inserted and secured using a 4-0 Prolene pursestring.  The aortic crossclamp was applied and   the heart was arrested using cold blood enhanced retrograde cardioplegia.  Topical cooling was also utilized.  Once the aorta was decompressed, it was transected just distal to the sinotubular junction.  Hand-held cardioplegia was administered to the right coronary.  A prompt electromechanical arrest was achieved.     Exposure sutures were placed, and the aortic valve was visualized.  One of the leaflets had evidence of degeneration. The sewing ring was well-incorporated into the annulus.  The valve sutures were grasped with a tonsil clamp.  The pseudo endothelium was dissected from the sewing ring.  The valve sutures were then divided sharply using an 11 blade.  In this manner we carefully worked our way around the circumference of the valve, identifying a suture, exposing it, and then carefully dividing it.  Eventually, all the sutures were removed.  A [place] dissector was then used to carefully dissect the prosthesis away from the aortic annulus.  It was densely adherent to the annulus. Eventually, the valve was dissected free.  Extensive annular debridement was then performed, including removal of the sub annular pledgets.    We then turned our attention back to the right atrium.  The fossa ovalis was identified and incised.  The incision was carried out onto the dome of the left atrium.  The Cooper retractor was used for exposure.  The mitral valve was evaluated.  The findings were consistent with rheumatic heart disease.  Cords were thickened.  Both leaflets were calcified, although the anterior leaflet was more calcified than the posterior. The anterior leaflet was partially resected and used for exposure as   multiple pledgeted 2-0 Ethibond sutures were placed along the anterior annulus. The anterior leaflet was eventually resected in its entirety.  We were able   to preserve most of the posterior leaflet as we  completed circumferential   coverage of the mitral orifice using pledgeted 2-0 Ethibond sutures.  Once the   sutures were all in, the annulus was sized and a 29 mm valve was selected.  The   valve was brought up, the sutures were passed through the sewing ring and it was   slid into position.  It seated nicely.  The sutures were tied and then cut.    The dome of the left atrium was then closed in a single layer using running 4-0 Prolene   suture.      We then turned our attention back to the aortic root.  Exposure of the annulus was reestablished.  The ventricle was irrigated with a liter of cold saline.  The annulus was sized and a 21 mm valve was selected.  While the valve was being retrieved, multiple pledgeted 2-0 Ethibond sutures were   placed circumferentially around the annulus.  Once the sutures were all in, the   valve was brought up, the sutures were passed through the sewing ring, and it   was slid into position.  It seated nicely.  The sutures were tied and then cut.     The sinotubular junction was sized, and a 32 mm Dacron tube graft was selected.  The proximal anastomosis was then performed using a running 4-0 Prolene suture reinforced with a felt strip.   The distal portion of the Dacron tube graft was then tailored, and the graft to aorta anastomosis was performed using a running 4-0 Prolene suture reinforced with a felt strip.  An aortic root vent was placed into the Dacron tube graft.      We then turned our attention back to the right atrium where the interatrial septum was reconstructed using a running 4-0 Prolene suture in a single layer.  Prior to closing the left atrium, de-airing maneuvers were performed.    Once the left atrium was closed, the hot shot was given retrograde.  Additional   de-airing maneuvers were performed and the aortic cross-clamp was removed.  The heart was allowed to reperfuse and de air as we completed closure of the right atrial free wall.  The   patient was  subsequently weaned from cardiopulmonary bypass.  The patient did   separate easily from bypass with good biventricular function.  There was significant vasoplegia.  Once off bypass, all surgical sites were   inspected.  There was good surgical hemostasis.  The valves were evaluated using ZAIRE and   appeared to be functioning properly.  The test dose of protamine was   administered and this was well tolerated.  The total dose was then given.    Braxton through the total dose, all cannulas were removed.  All surgical sites   were again inspected, again there was good surgical hemostasis.  There was diffuse coagulopathic bleeding.  The patient was given blood and blood products.  Eventually adequate hemostasis was obtained.  Ventricular pacing wires   were placed.  Drains were placed.  After again confirming adequate hemostasis,   the patient's chest was closed using #6 stainless steel wires to reapproximate   the sternum.  The overlying soft tissues were reapproximated using absorbable   suture material.  The patient's chest was washed and dried and a dry dressing   was applied.  The patient tolerated the procedure well, there were no   complications.  At the conclusion of the case, sponge and instrument counts were   correct.

## 2024-08-08 NOTE — NURSING
Notified MD Hernandez of pt's latest neuro changes: Pupils dilated on both sides, non-reactive based on Npi score with the pupillometer, pt still had no cough or gag reflexes, negative corneal reflexes since admit from OR. MD aware, stated will let day team know. Also notified MD that pt was satting in 80s earlier, which improved after increased HOB, CVP couldn't be obtained due to hypotensive episode earlier with x-ray. MD aware, stated can continue giving albumin as ordered.

## 2024-08-08 NOTE — ASSESSMENT & PLAN NOTE
ESRD on home dialysis 4 times per week  Cardiogenic shock - currently on 4 pressors  S/p aortic and mitral valve replacement and ascending aorta repair  Acute hypoxic resp failure/ARDs d/t pulmonary edema  Severe lactic acidosis - combined metabolic and resp acidosis  Hypernatremia  Hypercalcemia  Elevated liver enzymes - likely acute liver injury  Leukocytosis  Blood loss anemia    Recommendations/Plan  Consent for RRT obtained from family, documented in chart. Trialysis placed during surgery. Initiate SLED therapy - 4 K continuous rate of 400-450 cc/hr bicarb 30 Na 140 given he is receiving 262 cc/hr of his current drips. Plan is to improve his metabolic acidosis and remove fluid as tolerated given his ARDs/acute pulmonary edema. Correcting his acidosis will improve efficacy of pressors  Will reassess daily RRT needs and make adjustments as needed  Avoid nephrotoxic medications  Strict I/Os

## 2024-08-08 NOTE — SUBJECTIVE & OBJECTIVE
Interval History/Significant Events: Arrived from OR on maximal vasopressor support from levo, vaso, epi, and giapreza. Initiated CRRT but was unable to tolerate, rinsed back after 20min. Increasing acidosis with 6x amps bicarb given; nephrology initiated Prismax for solute removal. Multiple units of blood product given with TEG-guided resuscitation. Persistenly incrceasing pressor requirements with stress steroids and O2 radical scavengers ordered. ZAIRE performed AM 8/8 with evidence of anterior clot vs effusion, no posterior effusion, and septal flattening indicating RV overload. High LVOT pressures indicative of volume down state. ChT output accelerating from 100/200/300 an hour.    Follow-up For: Procedure(s) (LRB):  REPLACEMENT, AORTIC VALVE, WITH REPEAT STERNOTOMY (N/A)  REPLACEMENT, MITRAL VALVE (N/A)  REPLACEMENT, AORTA, ASCENDING (N/A)    Post-Operative Day: 1 Day Post-Op    Objective:     Vital Signs (Most Recent):  Temp: 97.5 °F (36.4 °C) (08/08/24 0930)  Pulse: 90 (08/08/24 0930)  Resp: (!) 28 (08/08/24 0930)  BP: 126/83 (08/08/24 0800)  SpO2: 99 % (ABG) (08/08/24 0900) Vital Signs (24h Range):  Temp:  [96.3 °F (35.7 °C)-99.1 °F (37.3 °C)] 97.5 °F (36.4 °C)  Pulse:  [] 90  Resp:  [16-31] 28  SpO2:  [79 %-100 %] 99 %  BP: ()/(51-83) 126/83  Arterial Line BP: ()/(25-69) 94/58     Weight: 86.6 kg (191 lb)  Body mass index is 25.2 kg/m².      Intake/Output Summary (Last 24 hours) at 8/8/2024 1005  Last data filed at 8/8/2024 0950  Gross per 24 hour   Intake 99415.07 ml   Output 3041 ml   Net 66417.07 ml          Physical Exam  Constitutional:       General: He is not in acute distress.     Appearance: Normal appearance.   HENT:      Head: Normocephalic and atraumatic.   Cardiovascular:      Pulses: Normal pulses.      Comments: x2 meds ChT, x2 pleural ChT  R IJ CVC  R IJ Quad lumen  R radial arterial line  L femoral a line  Temporary V wires in place  Extremities goal  Pulmonary:       Effort: No respiratory distress.      Comments: Intubated  Abdominal:      General: Abdomen is flat. There is no distension.      Palpations: Abdomen is soft.   Genitourinary:     Comments: Michel in place  Musculoskeletal:         General: Normal range of motion.      Cervical back: Normal range of motion.   Skin:     General: Skin is warm and dry.   Neurological:      General: No focal deficit present.      Comments: Cough and gag reflexes absent  Pupillometry 0.6            Vents:  Vent Mode: A/C (08/08/24 0712)  Ventilator Initiated: Yes (08/07/24 1939)  Set Rate: 28 BPM (08/08/24 0712)  Vt Set: 500 mL (08/08/24 0712)  PEEP/CPAP: 12 cmH20 (08/08/24 0712)  Oxygen Concentration (%): 100 (08/08/24 0930)  Peak Airway Pressure: 39 cmH20 (08/08/24 0712)  Plateau Pressure: 31 cmH20 (08/08/24 0712)  Total Ve: 11.2 L/m (08/08/24 0712)  Negative Inspiratory Force (cm H2O): 0 (08/08/24 0712)  F/VT Ratio<105 (RSBI): (!) 58.21 (08/08/24 0712)    Lines/Drains/Airways       Central Venous Catheter Line  Duration             Percutaneous Central Line - Quad Lumen  08/07/24 0948 Internal Jugular Right 1 day    Trialysis (Dialysis) Catheter 08/07/24 1901 right internal jugular <1 day              Drain  Duration                  Urethral Catheter 08/07/24 0841 Non-latex;Straight-tip;Temperature probe 16 Fr. 1 day         Y Chest Tube 1 and 2 08/07/24 1 Right Pleural 19 Fr. Left Pleural 19 Fr. 1 day         Y Chest Tube 3 and 4 08/07/24 3 Right Mediastinal 19 Fr. 4 Left Mediastinal 19 Fr. 1 day         NG/OG Tube 08/08/24 0500 Center mouth <1 day              Airway  Duration                  Airway - Non-Surgical 08/07/24 1 day              Arterial Line  Duration             Arterial Line 08/07/24 0944 Right Radial 1 day    Arterial Line 08/08/24 Left Femoral <1 day              Line  Duration                  Pacer Wires 08/07/24 1017 <1 day              Peripheral Intravenous Line  Duration                  Peripheral IV -  Single Lumen 08/07/24 0657 18 G Anterior;Distal;Right Forearm 1 day         Peripheral IV - Single Lumen 08/07/24 2139 20 G Anterior;Right Shoulder <1 day                    Significant Labs:    CBC/Anemia Profile:  Recent Labs   Lab 08/07/24 2220 08/07/24  2235 08/08/24  0211 08/08/24  0220 08/08/24  0515 08/08/24  0614 08/08/24  0710 08/08/24  0806 08/08/24  0908   WBC 17.11*  --  17.48*  --  15.92*  --   --   --   --    HGB 8.7*  --  9.0*  --  7.0*  --   --   --   --    HCT 29.4*   < > 29.3*   < > 24.2*   < > 15* 22* 21*   PLT 80*  --  58*  --  85*  --   --   --   --    *  --  94  --  99*  --   --   --   --    RDW 15.2*  --  17.9*  --  17.5*  --   --   --   --     < > = values in this interval not displayed.        Chemistries:  Recent Labs   Lab 08/07/24 1953 08/07/24 2220 08/08/24  0039 08/08/24  0515 08/08/24  0758   * 152* 153* 149*  --    K 3.7  3.6 3.2* 3.3* 4.0 4.3   * 110 112* 110  --    CO2 12* 16* 13* 10*  --    BUN 36* 36* 33* 30*  --    CREATININE 5.9* 6.4* 5.7* 4.9*  --    CALCIUM 11.5* 10.9* 10.1 9.4  --    ALBUMIN 1.9* 1.8* 1.9* 2.7*  --    PROT 3.8*  --  3.6* 4.3*  --    BILITOT 0.9  --  1.8* 2.9*  --    ALKPHOS 67  --  59 62  --    ALT 63*  --  450* 2,176*  --    *  --  505* 2,269*  --    MG 3.1* 3.1* 3.0* 3.1*  --    PHOS 5.3* 6.2* 6.7* 7.8*  --      Significant Imaging:  I have reviewed all pertinent imaging results/findings within the past 24 hours.

## 2024-08-08 NOTE — PROGRESS NOTES
Perfusion Standby Note:      08/08/2024  Perfusionist:  Oriana Castañeda  [facility]  Surgeons and Role:     * Waqar Bowman MD - Primary     * Miles Nagy DO - Fellow     * Umm Cooper MD - Fellow  Anesthesiologist:  Ascencion Monet MD    Past Medical History:   Diagnosis Date    Anemia of renal disease     Essential hypertension     Hx of aortic valve replacement     Immunosuppressed status     Lupus nephritis     Metabolic acidosis     Personal history of colonic polyps     Secondary hyperparathyroidism of renal origin     Stage 4 chronic kidney disease     Stenosis and insufficiency of lacrimal passages     Systemic lupus erythematosus          Perfusion department standby was requested for this case, utilizing either a full cardiopulmonary machine or ECMO pump.      All pre-op checklists were completed, all equipment was available, as were cannulae and other disposables.  A TAVR instrument tray was also verified as available, per the checklist, for any case requiring ECMO standby.    I fully participated in the briefing and time-out for this procedure.  I was present in the room for all critical portions of this case during which mechanical circulatory support could be required.  Please see cardiopulmonary bypass record for details.    6:19 PM

## 2024-08-08 NOTE — PROGRESS NOTES
08/08/24 0404   Treatment   Treatment Type   (CVVHDF)   Treatment Status New start   Dialysis Machine Number [id number]   Dialyzer Time (hours) 0   BVP (Liters) 0 L   Solutions Labeled and Current  Yes   Access Temporary Cath;Right;IJ   Catheter Dressing Intact  Yes   Alarms Engaged Yes   CRRT Comments CVVHDF started per MD orders   CRRT Prismaflex Hourly Documentation   Access Pressure (mmHg) -47 mmHg   Filter Pressure (mmHg) 119 mmHg   Transmembrane Pressure (mmHg) 17 mmHg   Pressure Drop (mmHg) 32 mmHg   Return Pressure (mmHg) 48 mmHg   Effluent Pressure (mmHg) 57 mmHg   Net Fluid Removal (Hourly)(mL) 0   Pre Replacement Fluid Rate (mL/hour) 1800 mL/hour   Post Replacement Fluid Rate (mL/hour) 200 mL/hour   Deaeration Chamber Level Adjusted? Yes

## 2024-08-08 NOTE — CONSULTS
[facility]  Nephrology  Consult Note    Patient Name: Spike Estrella  MRN: [id number]  Admission Date: 8/7/2024  Hospital Length of Stay: 0 days  Attending Provider: Waqar Bowman MD   Primary Care Physician: Constantin Schmid MD  Principal Problem:Nonrheumatic mitral valve regurgitation    Inpatient consult to Nephrology  Consult performed by: David Yao MD  Consult ordered by: Lan Hernandez MD  Reason for consult: emergent dialysis        Subjective:     HPI: Spike is a pleasant 57 yo man w pmhx significant for mini AVR by Dr Bowman in 2014, coronary artery disease, end-stage renal disease on dialysis secondary to lupus nephritis w fistula and angioplasty 11/21/23, hypertension, mitral regurg who is in the hospital for sternotomy with repair versus replacement of mitral valve and replacement of the aortic prosthesis and ascending aortic replacement. Nephrology team has been consulted for management of his dialysis while he is admitted in the hospital. At the time of seeing the patient he was intubated and sedated, and therefore information and consent for dialysis was obtained from family in the waiting room. He does home dialysis four days per week, always on Sunday/Thursday, and the other two days are Monday Tuesday or Wednesday. His last dialysis session was Monday. He normally uses his graft on his L arm. During surgery he received multiple units of blood products and fluids, pH was noted to trend down to 7.055, appeared to be in resp and metabolic acidosis w lactic up to 9.53, on four pressors at time of interview w MAP in the 50s, improved at this time w bicarb pushes. However he appears to be developing significant pulmonary edema/ARDs (was put on nitro drip) and we have been requested for emergent dialysis. 200 cc UOP during surgery. Trialysis in R IJ placed. Labs from yesterday show a creatinine of 6.3, potassium 3.9, sodium 142, BUN 44.  Intraoperative labs show  elevated lactate and hemoglobin down to 7.3 w WBC up to 21.     Past Medical History:   Diagnosis Date    Anemia of renal disease     Essential hypertension     Hx of aortic valve replacement     Immunosuppressed status     Lupus nephritis     Metabolic acidosis     Personal history of colonic polyps     Secondary hyperparathyroidism of renal origin     Stage 4 chronic kidney disease     Stenosis and insufficiency of lacrimal passages     Systemic lupus erythematosus        Past Surgical History:   Procedure Laterality Date    AORTIC VALVE REPLACEMENT  06/16/2014    porcine valve replacement    CARDIAC VALVE REPLACEMENT  06/2014    TONSILLECTOMY         Review of patient's allergies indicates:   Allergen Reactions    Amlodipine Swelling    Adhesive     Iodine     Codeine Anxiety     Current Facility-Administered Medications   Medication Frequency    0.9%  NaCl infusion (CRRT USE ONLY) Continuous    0.9%  NaCl infusion (for blood administration) Q24H PRN    0.9%  NaCl infusion Continuous    albumin human 5% bottle 25 g Once PRN    albuterol-ipratropium 2.5 mg-0.5 mg/3 mL nebulizer solution 3 mL Q4H    albuterol-ipratropium 2.5 mg-0.5 mg/3 mL nebulizer solution 3 mL Q4H PRN    angiotensin II (GIAPREZA) 2,500,000 ng in 0.9% NaCl 250 mL infusion Continuous    [START ON 8/8/2024] aspirin tablet 325 mg Daily    bisacodyL suppository 10 mg Daily PRN    [START ON 8/8/2024] ceFAZolin 2 g in D5W 50 mL IVPB (MB+) Q12H    dextrose 10% bolus 125 mL 125 mL PRN    dextrose 10% bolus 250 mL 250 mL PRN    dextrose 5 % and 0.45 % NaCl with KCl 20 mEq infusion Continuous    docusate sodium capsule 100 mg BID    EPINEPHrine 5 mg in dextrose 5% 250 mL infusion (premix) Continuous    famotidine (PF) injection 20 mg Daily    fentaNYL 50 mcg/mL injection 25 mcg Q1H PRN    fentaNYL 50 mcg/mL injection 25 mcg Q15 Min PRN    Followed by    [START ON 8/10/2024] fentaNYL 50 mcg/mL injection 50 mcg Q1H PRN    insulin regular in 0.9 % NaCl 100  unit/100 mL (1 unit/mL) infusion Continuous    magnesium sulfate 2g in water 50mL IVPB (premix) PRN    magnesium sulfate 2g in water 50mL IVPB (premix) PRN    magnesium sulfate 2g in water 50mL IVPB (premix) PRN    metoclopramide injection 5 mg Q6H PRN    mupirocin 2 % ointment 1 g BID    NORepinephrine 4 mg in dextrose 5% 250 mL infusion (premix) Continuous    ondansetron injection 4 mg Q12H PRN    oxyCODONE immediate release tablet 5 mg Q4H PRN    oxyCODONE immediate release tablet Tab 10 mg Q4H PRN    [START ON 8/8/2024] polyethylene glycol packet 17 g Daily    potassium chloride 20 mEq in 100 mL IVPB (FOR CENTRAL LINE ADMINISTRATION ONLY) PRN    And    potassium chloride 40 mEq in 100 mL IVPB (FOR CENTRAL LINE ADMINISTRATION ONLY) PRN    And    potassium chloride 20 mEq in 100 mL IVPB (FOR CENTRAL LINE ADMINISTRATION ONLY) PRN    propofol (DIPRIVAN) 10 mg/mL infusion Continuous    sodium chloride 0.9% flush 10 mL PRN    sodium phosphate 15 mmol in D5W 250 mL IVPB PRN    sodium phosphate 20.01 mmol in D5W 250 mL IVPB PRN    sodium phosphate 20.01 mmol in D5W 250 mL IVPB PRN    sodium phosphate 30 mmol in D5W 250 mL IVPB PRN    sodium phosphate 30 mmol in D5W 250 mL IVPB PRN    sodium phosphate 39.99 mmol in D5W 250 mL IVPB PRN    vasopressin (PITRESSIN) 0.2 Units/mL in D5W 100 mL infusion Continuous     Family History       Problem Relation (Age of Onset)    Cancer Father    Coronary artery disease Maternal Uncle    Hypertension Mother    Lymphoma Father    No Known Problems Brother, Brother, Brother, Brother    Valvular heart disease Mother          Tobacco Use    Smoking status: Never     Passive exposure: Never    Smokeless tobacco: Former     Types: Snuff, Chew     Quit date: 1997   Substance and Sexual Activity    Alcohol use: Yes     Alcohol/week: 1.0 standard drink of alcohol     Types: 1 Cans of beer per week     Comment: occasionally     Drug use: No    Sexual activity: Yes     Partners: Female     Birth  control/protection: None     Review of Systems   Reason unable to perform ROS: intubated and sedated.     Objective:     Vital Signs (Most Recent):  Temp: 98.2 °F (36.8 °C) (08/07/24 2000)  Pulse: 80 (08/07/24 2029)  Resp: (!) 22 (08/07/24 2029)  BP: (!) 162/78 (08/07/24 0649)  SpO2: (!) 91 % (08/07/24 2029) Vital Signs (24h Range):  Temp:  [98.2 °F (36.8 °C)-98.6 °F (37 °C)] 98.2 °F (36.8 °C)  Pulse:  [78-80] 80  Resp:  [16-22] 22  SpO2:  [79 %-100 %] 91 %  BP: (162)/(78) 162/78     Weight: 86.6 kg (191 lb) (08/07/24 0649)  Body mass index is 25.2 kg/m².  Body surface area is 2.11 meters squared.    I/O last 3 completed shifts:  In: 4950 [I.V.:2000; Blood:2950]  Out: -      Physical Exam  Constitutional:       Appearance: Normal appearance. He is ill-appearing.   HENT:      Head: Normocephalic and atraumatic.   Cardiovascular:      Rate and Rhythm: Normal rate and regular rhythm.      Pulses: Normal pulses.      Heart sounds: Normal heart sounds.   Pulmonary:      Breath sounds: Rales present.      Comments: Mechanical lung sounds. Significant diffuse rales bilaterally. Post surgical dressings from sternotomy, multiple drains w bloody fluid attached to pleur evac.   Abdominal:      General: Abdomen is flat. There is no distension.      Palpations: Abdomen is soft.   Genitourinary:     Comments: Michel in place  Musculoskeletal:      Right lower leg: No edema.      Left lower leg: No edema.   Skin:     General: Skin is warm and dry.      Comments: Shunt in place on L arm. Trialysis secured in place on R neck   Neurological:      Mental Status: He is alert.      Comments: Intubated and sedated   Psychiatric:      Comments: Intubated and sedated          Significant Labs:  All labs within the past 24 hours have been reviewed.    Significant Imaging:  X-Ray: Reviewed    Assessment/Plan:     Renal/  ESRD on hemodialysis  ESRD on home dialysis 4 times per week  Cardiogenic shock - currently on 4 pressors  S/p aortic and  mitral valve replacement and ascending aorta repair  Acute hypoxic resp failure/ARDs d/t pulmonary edema  Severe lactic acidosis - combined metabolic and resp acidosis  Hypernatremia  Hypercalcemia  Elevated liver enzymes - likely acute liver injury  Leukocytosis  Blood loss anemia    Recommendations/Plan  Consent for RRT obtained from family, documented in chart. Trialysis placed during surgery. Initiate SLED therapy - 4 K continuous rate of 400-450 cc/hr bicarb 30 Na 140 given he is receiving 262 cc/hr of his current drips. Plan is to improve his metabolic acidosis and remove fluid as tolerated given his ARDs/acute pulmonary edema. Correcting his acidosis will improve efficacy of pressors  Will reassess daily RRT needs and make adjustments as needed  Avoid nephrotoxic medications  Strict I/Os      Thank you for your consult. I will follow-up with patient. Please contact us if you have any additional questions.    David Yao MD  Nephrology  [facility]

## 2024-08-08 NOTE — OR NURSING
Upon transfer to ICU bed at 1639, patient had a change of status and surgeon was called back into room by Anesthesiologist. Compressions initiated by surgeon after applying sterile attire. OR  notified and additional staff to room to assist. Code cart brought into room. External pads on per protocol and switch to internal paddles per surgeon order. ROSC achieved at 1705. Surgeon dressed patient's surgical wound. OR team prepared to bring patient back to [id number]. Patient had another change of status at 1734. Surgeon undressed wound, then donned sterile attire and initiated compressions again at 1735 due to display of asystole rhythm on monitor. ACLS protocol followed under direction of surgeon and anesthesiologist. Last pulse check 1748, surgeon and anesthesia agreed at 1750 to stop resuscitation efforts. Time of Death pronounced by surgeon at 1750. Patient cleaned and brought back to [id number].

## 2024-08-08 NOTE — SUBJECTIVE & OBJECTIVE
Interval HPI:   Overnight events: Patient in room 30217/77914 A. Blood glucose stable. BG at goal on current insulin regimen (IIP). Steroid use- Hydrocortisone  100 mg TID. Day of Surgery  Renal function- Abnormal - Creatinine 3.0   Vasopressors-  Epinephrine  and Norepinephrine       Endocrine will continue to follow and manage insulin orders inpatient.         Diet NPO     Eating:   NPO  Nausea: No  Hypoglycemia and intervention: No  Fever: No  TPN and/or TF: No    PMH, PSH, FH, SH updated and reviewed     ROS:  Review of Systems  SHAVON due to intubation and sedation.     Current Medications and/or Treatments Impacting Glycemic Control  Immunotherapy:    Immunosuppressants       None          Steroids:   Hormones (From admission, onward)      Start     Stop Route Frequency Ordered    08/08/24 0530  hydrocortisone sodium succinate injection 100 mg         -- IV Every 8 hours 08/08/24 0525 08/07/24 2145  angiotensin II (GIAPREZA) 2,500,000 ng in 0.9% NaCl 250 mL infusion        Question Answer Comment   Begin at (in ng/kg/min) 80    Titrate by: (in ng/kg/min) 10    Titrate interval: (in minutes) 5    Titrate to maintain: (MAP or SBP) MAP    Greater than: (in mmHg) 60    Maximum dose: (in ng/kg/min) 80    Name of Attending Provider giving approval: Colby    Is the patient currently receiving norepinephrine >0.2 mcg/kg/min (or equivalent) AND vasopressin 0.04 units/min? Yes    Angiotensin II Infusion Adjustment (DO NOT MODIFY ANSWER) [email or web address] II Titration [email or web address]        -- IV Continuous 08/07/24 2035 08/07/24 2015  vasopressin (PITRESSIN) 0.2 Units/mL in D5W 100 mL infusion         -- IV Continuous 08/07/24 1911          Pressors:    Autonomic Drugs (From admission, onward)      Start     Stop Route Frequency Ordered    08/08/24 1115  EPINEPHrine 30 mg in 250 mL D5W (READY TO MIX) (PHARMACIST USE ONLY) infusion        Question Answer Comment   Begin at (in mcg/kg/min): 0.02     Titrate by: (in mcg/kg/min) 0.02    Titrate interval: (in minutes) 5    Titrate to maintain: (SBP or MAP or Cardiac Index) MAP    Greater than: (in mmHg) 65    Cardiac index greater than: (in L/min) 2.2    Maximum dose: (in mcg/kg/min) 2        -- IV Continuous 08/08/24 1015    08/08/24 1045  NORepinephrine 32 mg in D5W 250 mL infusion        Question Answer Comment   Begin at (in mcg/kg/min): 0.08    Titrate by: (in mcg/kg/min (RANGE PREFERRED) 0.02 - 0.2    Titrate interval: (in minutes) (RANGE PREFERRED) 2 - 5    Titrate to maintain: (MAP or SBP) MAP    Titrate to keep MAP within the range or GREATER than (if single number): (in mmHg) OTHER    Other 60    Maximum dose: (in mcg/kg/min) 3        -- IV Continuous 08/08/24 1039          Hyperglycemia/Diabetes Medications:   Antihyperglycemics (From admission, onward)      Start     Stop Route Frequency Ordered    08/08/24 1012  insulin aspart U-100 pen 0-10 Units         -- SubQ Every 4 hours PRN 08/08/24 0913             PHYSICAL EXAMINATION:  Vitals:    08/08/24 1401   BP:    Pulse: 79   Resp: (!) 28   Temp: 97.7 °F (36.5 °C)     Body mass index is 25.2 kg/m².     Physical Exam  Constitutional:       Appearance: He is well-developed. He is ill-appearing.      Interventions: He is sedated and intubated.   HENT:      Head: Normocephalic.   Eyes:      Conjunctiva/sclera: Conjunctivae normal.   Pulmonary:      Effort: Pulmonary effort is normal. He is intubated.   Skin:     General: Skin is warm.      Findings: No rash.

## 2024-08-08 NOTE — ASSESSMENT & PLAN NOTE
Titrate insulin slowly to avoid hypoglycemia as the risk of hypoglycemia increases with decreased creatinine clearance.  Estimated Creatinine Clearance: 28.6 mL/min (A) (based on SCr of 3.1 mg/dL (H)).

## 2024-08-08 NOTE — PLAN OF CARE
1:59 PM    The SW tried to engage with this patient to complete a DPA but the medical team was actively working on this patient. No family members were present.       Héctor Pickens, LCSW  Case Management [facility]

## 2024-08-08 NOTE — HPI
Reason for Consult: Management of Hyperglycemia     Surgical Procedure and Date: s/p mitral valve replacement, aortic valve replacement, and ascending aorta replacement  on 8/7    Patient is not diabetic and does not currently take any oral/injectable antidiabetic/hypoglycemic medications.     Hemoglobin A1C   Date Value Ref Range Status   08/06/2024 5.3 4.0 - 5.6 % Final     Comment:     ADA Screening Guidelines:  5.7-6.4%  Consistent with prediabetes  >or=6.5%  Consistent with diabetes    High levels of fetal hemoglobin interfere with the HbA1C  assay. Heterozygous hemoglobin variants (HbS, HgC, etc)do  not significantly interfere with this assay.   However, presence of multiple variants may affect accuracy.     06/12/2014 5.4 4.5 - 6.2 % Final          HPI: Spike Estrella is a 58 y.o. M with pmh of AS s/p mini AVR by Dr. Bowman in 2014, CAD, ESRD on HD (being worked up for renal transplant), HTN, Lupus, and severe MR. The patient presents to the SICU s/p mitral valve replacement, aortic valve replacement, and ascending aorta replacement with Dr. Bowman on 08/07/24. Endocrine consulted to manage hyperglycemia in the context of CTS.

## 2024-08-08 NOTE — PLAN OF CARE
Patient seen and examined mid day with the critical care team.  He is requiring less pharmacologic support for maintenance of blood pressure, but mediastinal drainage has increased.  We plan re-exploration.  I discussed this with his wife and family member.

## 2024-08-08 NOTE — PT/OT/SLP PROGRESS
Occupational Therapy      Patient Name:  Spike Estrella   MRN:  [id number]    Patient orders discontinued 2/2 no neurological responses s/p AVR. Please reconsult if warranted for OT/PT services.    Vane Baptiste OT    8/8/2024

## 2024-08-08 NOTE — BRIEF OP NOTE
[facility]  Cardiothoracic Surgery  Operative Note    SUMMARY     Date of Procedure: 8/7/2024     Procedure: Procedure(s) (LRB):    1)  RE-REPLACEMENT, AORTIC VALVE, WITH a 21 mm Medtronic Avalus bovine pericardial bioprosthesis  07529-12    2)  REPLACEMENT, MITRAL VALVE with a 29 mm Medtronic Mosaic porcine bioprosthesis  66449    3)  REPLACEMENT, AORTA, ASCENDING 15021    4)  REDO STERNOTOMY  41933    Surgeons and Role:     * Waqar Bowman MD - Primary     * Miles Nagy DO - Fellow     * Umm Cooper MD - Fellow    Assisting Surgeon: None    Pre-Operative Diagnosis: Hx of aortic valve replacement [Z95.2]  Severe mitral insufficiency [I34.0]    Post-Operative Diagnosis: Post-Op Diagnosis Codes:     * Hx of aortic valve replacement [Z95.2]     * Severe mitral insufficiency [I34.0]    Anesthesia: General    Operative Findings (including complications, if any): Severe calcification of aortic annulus and mitral valve.    Estimated Blood Loss (EBL): 100 mL           Implants:   Implant Name Type Inv. Item Serial No.  Lot No. LRB No. Used Action   FELT EDITH [id number] - [id number]  F[employer] [id number]  [employer] [id number]  1 Implanted   VALVE MOSAIC MITRAL CINCH 29MM - [id number]  VALVE MOSAIC MITRAL CINCH 29MM [id number] [employer] [facility] [place]  N/A 1 Implanted   VALVE AVALUS AORTIC HEART 21MM - [id number]  VALVE AVALUS AORTIC HEART 21MM [id number] [employer] [facility] [place]  N/A 1 Implanted   GRAFT 32 X 30 [employer] - [id number]  GRAFT 32 X 30 [employer] [id number] [employer] [id number] N/A 1 Implanted   PUTTY HEMASORB BONE RESORBABLE - [id number]  PUTTY HEMASORB BONE RESORBABLE  [id number] N/A 1 Implanted       Specimens:   Specimen (24h ago, onward)       Start     Ordered    08/07/24 1616  Specimen to Pathology, Surgery Cardiovascular  Once        Comments: Pre-op Diagnosis: Hx of aortic valve replacement [Z95.2]Severe mitral insufficiency  [I34.0]Procedure(s):REPLACEMENT, AORTIC VALVE, WITH REPEAT STERNOTOMYREPAIR, MITRAL VALVE, OPENREPLACEMENT, MITRAL VALVEREPLACEMENT, AORTA, ASCENDING Number of specimens: 3Name of specimens: 1- Aorta-Perm2- Aortic Valve Leaflets-Perm3- Mitral Valve-Perm     References:    Click here for ordering Quick Tip   Question Answer Comment   Procedure Type: Cardiovascular    Specimen Class: Routine/Screening    Release to patient Immediate        08/07/24 1617    08/07/24 1410  Specimen to Pathology, Surgery Cardiovascular  Once        Comments: Pre-op Diagnosis: Hx of aortic valve replacement [Z95.2]Severe mitral insufficiency [I34.0]Procedure(s):REPLACEMENT, AORTIC VALVE, WITH REPEAT STERNOTOMYREPLACEMENT, MITRAL VALVEREPLACEMENT, AORTA, ASCENDING Number of specimens: 3Name of specimens: 1. AORTA - PERMANENT2. OLD AORTIC VALVE - PERMANENT3. MITRAL VALVE - PERMANENT     References:    Click here for ordering Quick Tip   Question Answer Comment   Procedure Type: Cardiovascular    Specimen Class: Routine/Screening    Release to patient Immediate        08/07/24 1409                   Attestation:  I was present and scrubbed for the procedure.

## 2024-08-08 NOTE — PROGRESS NOTES
[facility]  Nephrology  Progress Note    Patient Name: Spike Estrella  MRN: [id number]  Admission Date: 8/7/2024  Hospital Length of Stay: 1 days  Attending Provider: Waqar Bowman MD   Primary Care Physician: Constantin Schmid MD  Principal Problem:Nonrheumatic mitral valve regurgitation    Subjective:     HPI: Spike is a pleasant 57 yo man w pmhx significant for mini AVR by Dr Bowman in 2014, coronary artery disease, end-stage renal disease on dialysis secondary to lupus nephritis w fistula and angioplasty 11/21/23, hypertension, mitral regurg who is in the hospital for sternotomy with repair versus replacement of mitral valve and replacement of the aortic prosthesis and ascending aortic replacement. Nephrology team has been consulted for management of his dialysis while he is admitted in the hospital. At the time of seeing the patient he was intubated and sedated, and therefore information and consent for dialysis was obtained from family in the waiting room. He does home dialysis four days per week, always on Sunday/Thursday, and the other two days are Monday Tuesday or Wednesday. His last dialysis session was Monday. He normally uses his graft on his L arm. During surgery he received multiple units of blood products and fluids, pH was noted to trend down to 7.055, appeared to be in resp and metabolic acidosis w lactic up to 9.53, on four pressors at time of interview w MAP in the 50s, improved at this time w bicarb pushes. However he appears to be developing significant pulmonary edema/ARDs (was put on nitro drip) and we have been requested for emergent dialysis. 200 cc UOP during surgery. Trialysis in R IJ placed. Labs from yesterday show a creatinine of 6.3, potassium 3.9, sodium 142, BUN 44.  Intraoperative labs show elevated lactate and hemoglobin down to 7.3 w WBC up to 21.     Interval History:   Patient remain critically ill on triple pressor therapy this AM (EPI, Levo,  Vaso, weaned off Angiotensin II). Remain intubated at 100% FiO2. Positive 11 L/24 hrs.   Labs still with significant electrolyte derangements despite RRT. Dose adjustments made to RRT.   pH 7.091. HCO3 13.1. PCO2 43.1.    Review of patient's allergies indicates:   Allergen Reactions    Amlodipine Swelling    Adhesive     Iodine     Codeine Anxiety     Current Facility-Administered Medications   Medication Frequency    0.9%  NaCl infusion (CRRT USE ONLY) Continuous    0.9%  NaCl infusion (for blood administration) Q24H PRN    0.9%  NaCl infusion (for blood administration) Q24H PRN    0.9%  NaCl infusion (for blood administration) Q24H PRN    0.9%  NaCl infusion (for blood administration) Q24H PRN    0.9%  NaCl infusion (for blood administration) Q24H PRN    0.9%  NaCl infusion (for blood administration) Q24H PRN    0.9%  NaCl infusion Continuous    0.9%  NaCl infusion PRN    albumin human 5% bottle 25 g Once PRN    albuterol-ipratropium 2.5 mg-0.5 mg/3 mL nebulizer solution 3 mL Q4H    albuterol-ipratropium 2.5 mg-0.5 mg/3 mL nebulizer solution 3 mL Q4H PRN    angiotensin II (GIAPREZA) 2,500,000 ng in 0.9% NaCl 250 mL infusion Continuous    bisacodyL suppository 10 mg Daily PRN    calcium chloride 1 g in D5W 100 mL IVPB Once    calcium chloride 2 g in D5W 100 mL IVPB Once    calcium chloride 2 g in D5W 100 mL IVPB Once    calcium chloride 2 g in D5W 100 mL IVPB Once    ceFAZolin 2 g in D5W 50 mL IVPB (MB+) Q12H    dextrose 10% bolus 125 mL 125 mL PRN    dextrose 10% bolus 250 mL 250 mL PRN    dextrose 5 % and 0.45 % NaCl with KCl 20 mEq infusion Continuous    docusate sodium capsule 100 mg BID    EPINEPHrine 30 mg in 250 mL D5W (READY TO MIX) (PHARMACIST USE ONLY) infusion Continuous    famotidine (PF) injection 20 mg Daily    fentaNYL 50 mcg/mL injection 25 mcg Q1H PRN    fentaNYL 50 mcg/mL injection 25 mcg Q15 Min PRN    Followed by    [START ON 8/10/2024] fentaNYL 50 mcg/mL injection 50 mcg Q1H PRN    glucagon  (human recombinant) injection 1 mg PRN    hydrocortisone sodium succinate injection 100 mg Q8H    insulin aspart U-100 pen 0-10 Units Q4H PRN    magnesium sulfate 2g in water 50mL IVPB (premix) PRN    metoclopramide injection 5 mg Q6H PRN    mupirocin 2 % ointment 1 g BID    nitric oxide gas Gas 40 ppm Continuous    NORepinephrine 32 mg in D5W 250 mL infusion Continuous    ondansetron injection 4 mg Q12H PRN    oxyCODONE immediate release tablet 5 mg Q4H PRN    oxyCODONE immediate release tablet Tab 10 mg Q4H PRN    polyethylene glycol packet 17 g Daily    propofol (DIPRIVAN) 10 mg/mL infusion Continuous    Prothrombin complex concentrate, human (Kcentra) 2,028 Units in sterile water for injection IVPB Once    sodium bicarbonate 150 mEq in D5W 1,000 mL infusion Continuous    sodium bicarbonate solution 50 mEq Once    sodium chloride 0.9% flush 10 mL PRN    sodium phosphate 20.01 mmol in D5W 250 mL IVPB PRN    sodium phosphate 30 mmol in D5W 250 mL IVPB PRN    sodium phosphate 39.99 mmol in D5W 250 mL IVPB PRN    vasopressin (PITRESSIN) 0.2 Units/mL in D5W 100 mL infusion Continuous       Objective:     Vital Signs (Most Recent):  Temp: 97.5 °F (36.4 °C) (08/08/24 1238)  Pulse: 79 (08/08/24 1238)  Resp: (!) 28 (08/08/24 1238)  BP: 126/83 (08/08/24 0800)  SpO2: 99 % (ABG) (08/08/24 1127) Vital Signs (24h Range):  Temp:  [96.3 °F (35.7 °C)-99.1 °F (37.3 °C)] 97.5 °F (36.4 °C)  Pulse:  [] 79  Resp:  [16-31] 28  SpO2:  [22 %-100 %] 99 %  BP: ()/(51-83) 126/83  Arterial Line BP: ()/(25-80) 94/54     Weight: 86.6 kg (191 lb) (08/07/24 0649)  Body mass index is 25.2 kg/m².  Body surface area is 2.11 meters squared.    I/O last 3 completed shifts:  In: 45403.6 [I.V.:6885.5; Blood:6241; IV Piggyback:125.1]  Out: 1413 [Urine:235; Other:78; Chest Tube:1100]     Physical Exam  Vitals and nursing note reviewed.   Constitutional:       Appearance: Normal appearance. He is ill-appearing.   HENT:      Head:  Normocephalic and atraumatic.   Cardiovascular:      Rate and Rhythm: Normal rate and regular rhythm.      Pulses: Normal pulses.      Heart sounds: Normal heart sounds.   Pulmonary:      Breath sounds: Rales present.      Comments: Mechanical lung sounds. Significant diffuse rales bilaterally. Post surgical dressings from sternotomy, multiple drains w bloody fluid attached to pleur evac.   Abdominal:      General: Abdomen is flat. There is no distension.      Palpations: Abdomen is soft.   Genitourinary:     Comments: Michel in place  Musculoskeletal:      Right lower leg: No edema.      Left lower leg: No edema.   Skin:     General: Skin is warm and dry.      Comments: Shunt in place on L arm. Trialysis secured in place on R neck   Neurological:      Mental Status: He is alert.      Comments: Intubated and sedated   Psychiatric:      Comments: Intubated and sedated          Significant Labs:  CBC:   Recent Labs   Lab 08/08/24  1113 08/08/24  1212   WBC 7.86  --    RBC 2.54*  --    HGB 7.1*  --    HCT 23.9* 19*   *  --    MCV 94  --    MCH 28.0  --    MCHC 29.7*  --      CMP:   Recent Labs   Lab 08/08/24  1113      CALCIUM 9.7   ALBUMIN 2.7*   PROT 4.7*      K 5.3*   CO2 10*      BUN 16   CREATININE 2.8*   ALKPHOS 79   ALT 4,114*   AST 5,012*   BILITOT 2.2*     All labs within the past 24 hours have been reviewed.     Assessment/Plan:     Cardiac/Vascular  * Nonrheumatic mitral valve regurgitation  - defer to primary team     Renal/  ESRD on hemodialysis  ESRD on home dialysis 4 times per week  Cardiogenic shock - currently on 4 pressors  S/p aortic and mitral valve replacement and ascending aorta repair  Acute hypoxic resp failure/ARDs d/t pulmonary edema  Severe lactic acidosis - combined metabolic and resp acidosis  Hypernatremia  Hypercalcemia  Elevated liver enzymes - likely acute liver injury  Leukocytosis  Blood loss anemia    Recommendations/Plan  Consent for RRT obtained from  family, documented in chart.   Will continue CVVHDF at adjusted dose for better clearance. Dose currently 80 mL/kg/hr. Net UF 0.  K trending up, plans to adjust to 2 K bath with CVVHDF  Will reassess daily RRT needs and make adjustments as needed per protocol.   Continue bicarbonate infusion at 150 mL/hr for now to help acidosis.   Avoid nephrotoxic medications  Strict I/Os        Thank you for your consult. I will follow-up with patient. Please contact us if you have any additional questions.    Sheryl Lucero DNP, FNP-C  Nephrology  [facility]

## 2024-08-09 ENCOUNTER — TELEPHONE (OUTPATIENT)
Dept: FAMILY MEDICINE | Facility: CLINIC | Age: 59
End: 2024-08-09
Payer: MEDICARE

## 2024-08-09 LAB
FINAL PATHOLOGIC DIAGNOSIS: NORMAL
GLUCOSE SERPL-MCNC: 115 MG/DL (ref 70–110)
GLUCOSE SERPL-MCNC: 133 MG/DL (ref 70–110)
GLUCOSE SERPL-MCNC: 149 MG/DL (ref 70–110)
GLUCOSE SERPL-MCNC: 160 MG/DL (ref 70–110)
GLUCOSE SERPL-MCNC: 161 MG/DL (ref 70–110)
GROSS: NORMAL
HCO3 UR-SCNC: 5.2 MMOL/L (ref 24–28)
HCO3 UR-SCNC: 7.5 MMOL/L (ref 24–28)
HCO3 UR-SCNC: 9.3 MMOL/L (ref 24–28)
HCO3 UR-SCNC: 9.4 MMOL/L (ref 24–28)
HCO3 UR-SCNC: 9.6 MMOL/L (ref 24–28)
HCT VFR BLD CALC: <15 %PCV (ref 36–54)
Lab: NORMAL
PCO2 BLDA: 19.3 MMHG (ref 35–45)
PCO2 BLDA: 34 MMHG (ref 35–45)
PCO2 BLDA: 38.1 MMHG (ref 35–45)
PCO2 BLDA: 39 MMHG (ref 35–45)
PCO2 BLDA: 43 MMHG (ref 35–45)
PH SMN: 6.85 [PH] (ref 7.35–7.45)
PH SMN: 7 [PH] (ref 7.35–7.45)
PH SMN: 7 [PH] (ref 7.35–7.45)
PH SMN: 7.04 [PH] (ref 7.35–7.45)
PH SMN: 7.04 [PH] (ref 7.35–7.45)
PO2 BLDA: 306 MMHG (ref 80–100)
PO2 BLDA: 340 MMHG (ref 80–100)
PO2 BLDA: 368 MMHG (ref 80–100)
PO2 BLDA: 376 MMHG (ref 80–100)
PO2 BLDA: 394 MMHG (ref 80–100)
POC BE: -21 MMOL/L
POC BE: -22 MMOL/L
POC BE: -22 MMOL/L
POC BE: -25 MMOL/L
POC BE: -26 MMOL/L
POC IONIZED CALCIUM: 0.92 MMOL/L (ref 1.06–1.42)
POC IONIZED CALCIUM: 0.93 MMOL/L (ref 1.06–1.42)
POC IONIZED CALCIUM: 1.35 MMOL/L (ref 1.06–1.42)
POC IONIZED CALCIUM: 1.79 MMOL/L (ref 1.06–1.42)
POC IONIZED CALCIUM: >2.5 MMOL/L (ref 1.06–1.42)
POC SATURATED O2: 100 % (ref 95–100)
POC TCO2: 10 MMOL/L (ref 23–27)
POC TCO2: 11 MMOL/L (ref 23–27)
POC TCO2: 11 MMOL/L (ref 23–27)
POC TCO2: 6 MMOL/L (ref 23–27)
POC TCO2: 9 MMOL/L (ref 23–27)
POTASSIUM BLD-SCNC: 5.5 MMOL/L (ref 3.5–5.1)
POTASSIUM BLD-SCNC: 6.1 MMOL/L (ref 3.5–5.1)
POTASSIUM BLD-SCNC: 6.2 MMOL/L (ref 3.5–5.1)
POTASSIUM BLD-SCNC: 7 MMOL/L (ref 3.5–5.1)
POTASSIUM BLD-SCNC: 7.1 MMOL/L (ref 3.5–5.1)
SAMPLE: ABNORMAL
SODIUM BLD-SCNC: 135 MMOL/L (ref 136–145)
SODIUM BLD-SCNC: 136 MMOL/L (ref 136–145)
SODIUM BLD-SCNC: 139 MMOL/L (ref 136–145)
SODIUM BLD-SCNC: 140 MMOL/L (ref 136–145)
SODIUM BLD-SCNC: 145 MMOL/L (ref 136–145)

## 2024-08-09 NOTE — BRIEF OP NOTE
[facility]  Cardiothoracic Surgery  Operative Note    SUMMARY     Date of Procedure: 8/8/2024     Procedure: Procedure(s) (LRB):    Mediastinal re-exploration    Surgeons and Role:     * Waqar Bowman MD - Primary     * Miles Nagy DO - Fellow     * Umm Cooper MD - Fellow    Assisting Surgeon: None    Pre-Operative Diagnosis: Hx of aortic valve replacement [Z95.2]  Severe mitral insufficiency [I34.0]    Post-Operative Diagnosis: Post-Op Diagnosis Codes:     * Hx of aortic valve replacement [Z95.2]     * Severe mitral insufficiency [I34.0]    Anesthesia: General    Operative Findings (including complications, if any):  Diffuse bleeding with no focal source.  Profound hemodynamic instability.  Difficulty in maintaining pacing despite additional wires, possibly due to acidosis and hyperkalemia.  Despite open cardiac massage a rhythm could not be obtained.    Estimated Blood Loss (EBL): 100 mL new bleeding. Moderate amount of old blood evacuated from R pleural space.                 Specimens:   Specimen (24h ago, onward)      None           Attestation:  I was present and scrubbed for the procedure.

## 2024-08-09 NOTE — HOSPITAL COURSE
Spike Estrella is a 58 y.o. M with pmh of AS s/p mini AVR by Dr. Bowman in 2014, CAD, ESRD on HD (being worked up for renal transplant), HTN, Lupus, and severe MR     The patient presents to the SICU s/p mitral valve replacement, aortic valve replacement, and ascending aorta replacement with Dr. Bowman on 08/07/24.   On admission, they are intubated, sedated with propofol, and in stable condition. Inotropic and vasopressor requirements upon admission are 0.1 mcg/kg/min of epinephrine, 0.2 mcg/kg/min of norepinephrine, 0.08 units/min of vasopressin, and giapreza 80 ng/kg/min to maintain blood pressure at a MAP 60. Central access includes a [facility] CVC x2, arterial access includes a left radial arterial line.      Intraoperatively, they received 3L of crystalloid, 1800cc of cell saver, 2 PRBCs, 8 FFP, 2 platelets, and 2 cryoprecipitate. Urine output intraoperatively was 225cc.     Initially post operatively had decreasing pressor requirements with transfusion of blood products.  After turning for x-ray had immediate increase in pressor requirements.  Nephrology initiated CRRT, however patient was unable to tolerate due to significantly increased pressor requirements and was immediately rinsed back.  Nephrology re-attempted CRRT however pressors were too high at the time to initiate CRRT, and Prismax then initiated.  Throughout the night, patient had increasing pressor requirements with intermittent improvement from blood product transfusion.  Chest tube outputs remained consistently 30-50 cc/hour with continuous stripping.  He remained on nitric at 20 parts per million.  His ventilator settings remained elevated with some weaning of the FiO2.  On postop day 1 early morning, he was on 0.44 levo, 0.44 epi, RAFAEL present 80, vasopressin 0.08.  At this time, nitric oxide was increased to 40 parts per million, stress dose steroids were added, and cyano-kit was added.  He was ultimately transfused numerous blood products  throughout the day of postop day 1, was ultimately taken back to the OR for reexploration due to increasing chest tube output.  Upon transferring from the OR table back to the patient bed, patient coded and was quickly returned to the OR table.  Cardiac massage was initiated and numerous mg of epinephrine were administered and he was coded for approximately 15-20 minutes after which was it was determined further attempts were likely futile.  Patient  in the OR at around 1751 and was extubated.

## 2024-08-09 NOTE — OP NOTE
DATE OF PROCEDURE:  8/8/2024     ATTENDING SURGEON:  Waqra Bowman M.D.     ASSISTANT:  Curtis Pantoja MD (Cardiothoracic Attending), Miles Nagy DO and Umm Cooper MD (Cardiothoracic residents).     PREOPERATIVE DIAGNOSES:  1.  Postoperative bleeding  2.  Status post redo sternotomy, redo aortic valve replacement, mitral valve replacement, and ascending aorta replacement.  3.  Multiorgan dysfunction  4.  End-stage renal disease     POSTOPERATIVE DIAGNOSES:  1.  Postoperative bleeding  2.  Status post redo sternotomy, redo aortic valve replacement, mitral valve replacement, and ascending aorta replacement.  3.  Multiorgan dysfunction  4.  End-stage renal disease     OPERATION PERFORMED:   Mediastinal exploration     ANESTHESIA:  General endotracheal.     ESTIMATED BLOOD LOSS:  100 mL     BRIEF HISTORY:   Mr. Estrella is a 58-year-old gentleman who had a previous aortic valve replacement in 2014.  Since then, he developed renal failure.  His valve has degenerated, and he is develop calcifications of his mitral valve.  He had mixed mitral stenosis and mitral regurgitation.  On 7 August he underwent redo sternotomy, redo aortic valve replacement, mitral valve replacement, and ascending aortic replacement.  Upon separation from bypass, he had good biventricular function but was profoundly vasoplegic.  His need for hemodynamic support waxed and waned overnight, but never resolved.  Concerning for hepatic failure was hypoglycemia.  This morning he seemed to respond to volume, but his coagulopathy persisted.  Given the persistent drainage, I felt that re-exploration was indicated.       PROCEDURE IN DETAIL:  After obtaining informed and written consent, the patient   was brought to the Operating Room and placed on the operating table in the   supine position.  The patient became unstable with transfer to the operating table.  His chest was rapidly prepped and draped. The previous incision was opened, and the  wires were removed.  The sternal retractor was placed.  There was a moderate amount of intrapericardial blood and blood clot.  Direct cardiac massage was performed and ROSC was obtained.  The hemodynamics improved somewhat, but it was unclear if this was due to opening the chest or multiple medications given by the anesthesia team.  It was difficult to maintain pacing capture despite the addition of multiple additional wires.  Once the old blood and clot had been evacuated from the mediastinum and right pleural space all surgical sites were inspected.  There was diffuse oozing from all raw surfaces, including needle holes.  The drains were removed and irrigated.  All surgical sites were inspected, and no focal bleeding could be identified.  There was diffuse oozing from all raw surfaces.  After thoroughly examining all surgical sites, the drains were repositioned.  We made the decision to leave the chest open.  We planned to return to the ICU for correction of the acidosis and metabolic disarray.  A sternal strut was fashioned.  Laparotomy pads were placed in the mediastinum, and an Ioban dressing was applied to create a sterile occlusive bandage.  The patient was then transferred to the bed for return to the ICU.  With transfer to the bed he again became unstable.  The ioban dressing was taken down and direct cardiac massage was performed.  He was defibrillated.  Pacing capture was intermittent.  Multiple doses of epinephrine were administered by the anesthesia team.  Cardiac massage continued.  We persisted with this for at least 20 minutes without return of spontaneous contractions with pacing.  The pH was less than 6.9 and he was hyperkalemic.  I felt that additional efforts would be futile.  The code was stopped.  The chest was closed.  I then spoke with the patient's family.  The body was returned to the ICU so that they could see him.

## 2024-08-09 NOTE — DISCHARGE SUMMARY
[facility]  Critical Care - Surgery  Discharge Summary      Patient Name: Spike Estrella  MRN: [id number]  Admission Date: 8/7/2024  Hospital Length of Stay: 1 days  Discharge Date and Time: 8/8/2024   Attending Physician: Waqar Bowman MD   Discharging Provider: Lan Hernandez MD  Primary Care Provider: Constantin Schmid MD    HPI:  Spike Estrella is a 58 y.o. M with pmh of AS s/p mini AVR by Dr. Bowman in 2014, CAD, ESRD on HD (being worked up for renal transplant), HTN, Lupus, and severe MR    The patient presents to the SICU s/p mitral valve replacement, aortic valve replacement, and ascending aorta replacement with Dr. Bowman on 08/07/24.   On admission, they are intubated, sedated with propofol, and in stable condition. Inotropic and vasopressor requirements upon admission are 0.1 mcg/kg/min of epinephrine, 0.2 mcg/kg/min of norepinephrine, 0.08 units/min of vasopressin, and giapreza 80 ng/kg/min to maintain blood pressure at a MAP 60. Central access includes a [facility] CVC x2, arterial access includes a left radial arterial line.      Intraoperatively, they received 3L of crystalloid, 1800cc of cell saver, 2 PRBCs, 8 FFP, 2 platelets, and 2 cryoprecipitate. Urine output intraoperatively was 225cc.      Immediate post-operative plans include hemodynamic stabilization and weaning of cardiac and respiratory support. Plan to wean vasopressors and inotropes as tolerated, wean ventilator support, and closely monitor fluid status with strict Is/Os and continued fluid resuscitation as needed.       Procedure(s) (LRB):  RE-EXPLORATION (N/A)    Indwelling Lines/Drains at Time of Discharge:   Lines/Drains/Airways       Drain  Duration                  Y Chest Tube 1 and 2 08/08/24 1603 1 Right Mediastinal 19 Fr. 2 Left Mediastinal 19 Fr. <1 day              Line  Duration                  Pacer Wires 08/07/24 1017 1 day                    Hospital Course:  Spike Estrella is a 58 y.o. M  with pmh of AS s/p mini AVR by Dr. Bowman in 2014, CAD, ESRD on HD (being worked up for renal transplant), HTN, Lupus, and severe MR     The patient presents to the SICU s/p mitral valve replacement, aortic valve replacement, and ascending aorta replacement with Dr. Bowman on 08/07/24.   On admission, they are intubated, sedated with propofol, and in stable condition. Inotropic and vasopressor requirements upon admission are 0.1 mcg/kg/min of epinephrine, 0.2 mcg/kg/min of norepinephrine, 0.08 units/min of vasopressin, and giapreza 80 ng/kg/min to maintain blood pressure at a MAP 60. Central access includes a [facility] CVC x2, arterial access includes a left radial arterial line.      Intraoperatively, they received 3L of crystalloid, 1800cc of cell saver, 2 PRBCs, 8 FFP, 2 platelets, and 2 cryoprecipitate. Urine output intraoperatively was 225cc.     Initially post operatively had decreasing pressor requirements with transfusion of blood products.  After turning for x-ray had immediate increase in pressor requirements.  Nephrology initiated CRRT, however patient was unable to tolerate due to significantly increased pressor requirements and was immediately rinsed back.  Nephrology re-attempted CRRT however pressors were too high at the time to initiate CRRT, and Prismax then initiated.  Throughout the night, patient had increasing pressor requirements with intermittent improvement from blood product transfusion.  Chest tube outputs remained consistently 30-50 cc/hour with continuous stripping.  He remained on nitric at 20 parts per million.  His ventilator settings remained elevated with some weaning of the FiO2.  On postop day 1 early morning, he was on 0.44 levo, 0.44 epi, RAFAEL present 80, vasopressin 0.08.  At this time, nitric oxide was increased to 40 parts per million, stress dose steroids were added, and cyano-kit was added.  He was ultimately transfused numerous blood products throughout the day of postop day 1,  "was ultimately taken back to the OR for reexploration due to increasing chest tube output.  Upon transferring from the OR table back to the patient bed, patient coded and was quickly returned to the OR table.  Cardiac massage was initiated and numerous mg of epinephrine were administered and he was coded for approximately 15-20 minutes after which was it was determined further attempts were likely futile.  Patient  in the OR at around 1751 and was extubated.    Goals of Care Treatment Preferences:  Code Status: Full Code      Consults (From admission, onward)          Status Ordering Provider     Inpatient consult to Nephrology  Once        Provider:  (Not yet assigned)    Completed JOSE MIR     Consult to Endocrinology  Once        Provider:  (Not yet assigned)    Completed RANDA DURON     Consult Case Management/Social Work  Once        Provider:  (Not yet assigned)    Acknowledged RANDA DURON            Significant Labs:  ABGs:   Recent Labs   Lab 24  1505   PH 6.943*   PCO2 52.2*   HCO3 11.3*   POCSATURATED 100   BE -21*     CMP:   Recent Labs   Lab 24  0039 24  0515 24  0758 24  1113 24  1340   * 149*  --  139 141   K 3.3* 4.0 4.3 5.3* 6.6*   * 110  --  109 106   CO2 13* 10*  --  10* 7*   * 80  --  107 78   BUN 33* 30*  --  16 17   CREATININE 5.7* 4.9*  --  2.8* 3.1*   CALCIUM 10.1 9.4  --  9.7 11.5*   PROT 3.6* 4.3*  --  4.7*  --    ALBUMIN 1.9* 2.7*  --  2.7* 2.5*   BILITOT 1.8* 2.9*  --  2.2*  --    ALKPHOS 59 62  --  79  --    * 2,269*  --  5,012*  --    * 2,176*  --  4,114*  --    ANIONGAP 28* 29*  --  20* 28*     Lactic Acid: No results for input(s): "LACTATE" in the last 48 hours.    Significant Imaging:  I have reviewed all pertinent imaging results/findings within the past 24 hours.    Pending Diagnostic Studies:       Procedure Component Value Units Date/Time    Specimen to Pathology, Surgery " Cardiovascular [[id number]] Collected: 24 1617    Order Status: Sent Lab Status: In process Updated: 24 0920    Specimen: Tissue     Specimen to Pathology, Surgery Cardiovascular [[id number]] Collected: 24    Order Status: Sent Lab Status: In process Updated: 24    Specimen: Tissue           Final Active Diagnoses:    Diagnosis Date Noted POA    PRINCIPAL PROBLEM:  Nonrheumatic mitral valve regurgitation [I34.0] 2024 Yes    Ascending aorta dilatation [I77.810] 2024 Yes    Elevated alkaline phosphatase level [R74.8] 2024 Yes    Drug-induced immunodeficiency [D84.821, Z79.899] 2023 Yes    Degenerative disc disease, lumbar [M51.36] 2023 Yes    Pure hypercholesterolemia [E78.00] 10/14/2022 Yes    ESRD on hemodialysis [N18.6, Z99.2] 2021 Not Applicable    Anemia of renal disease [N18.9, D63.1]  Yes     Chronic    Secondary hyperparathyroidism of renal origin [N25.81]  Yes     Chronic    Hx of aortic valve replacement [Z95.2]  Not Applicable    Hyperglycemia [R73.9] 2014 Yes    Essential hypertension [I10]  Yes     Chronic    SLE (systemic lupus erythematosus) [M32.9] 2014 Yes      Problems Resolved During this Admission:       Discharged Condition:     Disposition:     Follow Up:    Patient Instructions:   No discharge procedures on file.  Medications:  None    Lan Hernandez MD  Critical Care - Surgery  [facility]

## 2024-08-11 LAB
BLD PROD TYP BPU: NORMAL
BLOOD UNIT EXPIRATION DATE: NORMAL
BLOOD UNIT TYPE CODE: 5100
BLOOD UNIT TYPE CODE: 9500
BLOOD UNIT TYPE: NORMAL
CODING SYSTEM: NORMAL
CROSSMATCH INTERPRETATION: NORMAL
DISPENSE STATUS: NORMAL
NUM UNITS TRANS FFP: NORMAL
NUM UNITS TRANS PACKED RBC: NORMAL

## 2024-08-12 LAB
CLASS I ANTIBODIES - LUMINEX: NEGATIVE
CLASS I ANTIBODIES - LUMINEX: NEGATIVE
CLASS I ANTIBODY COMMENTS - LUMINEX: NORMAL
CLASS I ANTIBODY COMMENTS - LUMINEX: NORMAL
CLASS II ANTIBODIES - LUMINEX: NEGATIVE
CLASS II ANTIBODIES - LUMINEX: NEGATIVE
CLASS II ANTIBODY COMMENTS - LUMINEX: NORMAL
CLASS II ANTIBODY COMMENTS - LUMINEX: NORMAL
CPRA %: 0
CPRA %: 0
SERUM COLLECTION DT - LUMINEX CLASS I: NORMAL
SERUM COLLECTION DT - LUMINEX CLASS II: NORMAL
SLAA1 TESTING DATE: NORMAL
SLAA1 TESTING DATE: NORMAL
SLAA2 TESTING DATE: NORMAL
SLAA2 TESTING DATE: NORMAL
SPIM1 TESTING DATE: NORMAL
SPIM1 TESTING DATE: NORMAL
SPIM2 TESTING DATE: NORMAL
SPIM2 TESTING DATE: NORMAL
SPLAA TESTING DATE: NORMAL
SPLAA TESTING DATE: NORMAL
SPLIM TESTING DATE: NORMAL
SPLIM TESTING DATE: NORMAL

## 2024-08-14 NOTE — PHYSICIAN QUERY
Provider, please specify the       Acuity of the Blood Loss Anemia          Acute blood loss anemia

## 2024-08-14 NOTE — PHYSICIAN QUERY
Provider,  Please specify the Coagulopathy Diagnosis         Coagulation deficiency unspecified

## 2024-08-14 NOTE — PHYSICIAN QUERY
Provider,  Please clarify     Postoperative Bleeding   as it relates to the    8/7 Aortic Valve Replacement     Complication of the procedure

## (undated) DEVICE — CANNULA RETROGRADE CARDIOPLEG

## (undated) DEVICE — SUT PROLENE 5-0 24 C-1 BL

## (undated) DEVICE — BLADE STERN 65.8MM

## (undated) DEVICE — DRESSING AQUACEL FOAM 4 X 12

## (undated) DEVICE — KIT SAHARA DRAPE DRAW/LIFT

## (undated) DEVICE — DRAPE INCISE IOBAN 2 23X17IN

## (undated) DEVICE — SYR 30CC LUER LOCK

## (undated) DEVICE — KIT URINARY CATH URINE METER

## (undated) DEVICE — PAD DEFIB CADENCE ADULT R2

## (undated) DEVICE — SPONGE GAUZE 16PLY 4X4

## (undated) DEVICE — DECANTER FLUID TRNSF WHITE 9IN

## (undated) DEVICE — BLADE SAW STERNAL 5/BX

## (undated) DEVICE — DRAPE CVMAX SPLIT ANES SCRN

## (undated) DEVICE — CLOSURE SKIN STERI STRIP 1/2X4

## (undated) DEVICE — SUT PROLENE 5-0 BL C-1 4-24

## (undated) DEVICE — SUT 6 18IN STEEL MONO CCS

## (undated) DEVICE — CATH ON-Q EXPANSION 5

## (undated) DEVICE — ELECTRODE REM PLYHSV RETURN 9

## (undated) DEVICE — RETRACTOR OCTOBASE INSERT HOLD

## (undated) DEVICE — SOL 9P NACL IRR PIC IL

## (undated) DEVICE — SUT 3-0 CTD VICRYL 27IN PS

## (undated) DEVICE — BLADE PEAK PLASMA

## (undated) DEVICE — SUT VICRYL BR 1 GEN 27 CT-1

## (undated) DEVICE — SUT PROLENE 3-0 SH DA 36 BL

## (undated) DEVICE — SUT SILK 2-0 SH 18IN BLACK

## (undated) DEVICE — FOGERTY SOFT JAW DISP 2/PK

## (undated) DEVICE — DRAIN CHANNEL ROUND 19FR

## (undated) DEVICE — SUT ETHIBOND XTRA 2-0 SH-2

## (undated) DEVICE — DRESSING ADH ISLAND 3.6 X 14

## (undated) DEVICE — SOL NS 1000CC

## (undated) DEVICE — SUT SILK BLK BR. 2 2-60

## (undated) DEVICE — GLOVE BIOGEL PI MICRO SZ 7.5

## (undated) DEVICE — DRAPE SLUSH WARMER WITH DISC

## (undated) DEVICE — SUT PROLENE 4-0 SH BLU 36IN

## (undated) DEVICE — NDL 22GA X1 1/2 REG BEVEL

## (undated) DEVICE — INSERTS STEALTH FIBRA SIZE 5

## (undated) DEVICE — CAUTERY HIGH TEMP 2IN FLEXIBLE

## (undated) DEVICE — SOL NACL 0.9% INJ 500ML BG

## (undated) DEVICE — DRESSING AQUACEL SACRAL 9 X 9

## (undated) DEVICE — TIP YANKAUERS BULB NO VENT

## (undated) DEVICE — SPONGE COTTON TRAY 4X4IN

## (undated) DEVICE — LOOP VESSEL BLUE MAXI

## (undated) DEVICE — BOWL STERILE LARGE 32OZ

## (undated) DEVICE — SUT VICRYL PLUS 3-0 FS1 27

## (undated) DEVICE — SUT ETHIBOND EXCEL 2-0 SH-2

## (undated) DEVICE — TRAY CATH 1-LYR URIMTR 16FR

## (undated) DEVICE — HEMOSTAT SURGICEL NU-KNIT 6X9

## (undated) DEVICE — COVER SET UP STRL 54X54 20/BX

## (undated) DEVICE — PATCH SEALANT EVARREST 2X4

## (undated) DEVICE — CANNULA MULTIPLE PERFUSIONSET

## (undated) DEVICE — PROBE CATH TEMP 16 FRFOLEY 400

## (undated) DEVICE — TRAY HEART OMC

## (undated) DEVICE — DRAIN CHEST DRY SUCTION

## (undated) DEVICE — CONTAINER SPECIMEN OR STER 4OZ

## (undated) DEVICE — SUT 2-0 VICRYL / CT-1

## (undated) DEVICE — SUT 2/0 30IN ETHIBOND

## (undated) DEVICE — ELECTRODE PAD DEFIB STERILE

## (undated) DEVICE — Device

## (undated) DEVICE — SUT PROLENE 4-0 RB-1 BL MO

## (undated) DEVICE — SUT 2/0 36IN COATED VICRYL